# Patient Record
Sex: MALE | Race: WHITE | Employment: OTHER | URBAN - METROPOLITAN AREA
[De-identification: names, ages, dates, MRNs, and addresses within clinical notes are randomized per-mention and may not be internally consistent; named-entity substitution may affect disease eponyms.]

---

## 2021-01-07 ENCOUNTER — HOSPITAL ENCOUNTER (EMERGENCY)
Age: 86
Discharge: HOME | End: 2021-01-07
Attending: EMERGENCY MEDICINE
Payer: MEDICARE

## 2021-01-07 VITALS
TEMPERATURE: 97.3 F | WEIGHT: 147.49 LBS | DIASTOLIC BLOOD PRESSURE: 80 MMHG | RESPIRATION RATE: 16 BRPM | OXYGEN SATURATION: 97 % | HEIGHT: 70 IN | BODY MASS INDEX: 21.11 KG/M2 | HEART RATE: 85 BPM | SYSTOLIC BLOOD PRESSURE: 134 MMHG

## 2021-01-07 DIAGNOSIS — K40.91 UNILATERAL RECURRENT INGUINAL HERNIA WITHOUT OBSTRUCTION OR GANGRENE: Primary | ICD-10-CM

## 2021-01-07 LAB
ALBUMIN SERPL-MCNC: 3.7 G/DL (ref 3.5–5)
ALBUMIN/GLOB SERPL: 1.3 {RATIO} (ref 1.1–2.2)
ALP SERPL-CCNC: 81 U/L (ref 45–117)
ALT SERPL-CCNC: 27 U/L (ref 12–78)
ANION GAP SERPL CALC-SCNC: 3 MMOL/L (ref 5–15)
AST SERPL-CCNC: 16 U/L (ref 15–37)
BASOPHILS # BLD: 0.1 K/UL (ref 0–0.1)
BASOPHILS NFR BLD: 1 % (ref 0–1)
BILIRUB SERPL-MCNC: 0.9 MG/DL (ref 0.2–1)
BUN SERPL-MCNC: 32 MG/DL (ref 6–20)
BUN/CREAT SERPL: 34 (ref 12–20)
CALCIUM SERPL-MCNC: 9 MG/DL (ref 8.5–10.1)
CHLORIDE SERPL-SCNC: 105 MMOL/L (ref 97–108)
CO2 SERPL-SCNC: 30 MMOL/L (ref 21–32)
CREAT SERPL-MCNC: 0.93 MG/DL (ref 0.7–1.3)
DIFFERENTIAL METHOD BLD: ABNORMAL
EOSINOPHIL # BLD: 0.2 K/UL (ref 0–0.4)
EOSINOPHIL NFR BLD: 3 % (ref 0–7)
ERYTHROCYTE [DISTWIDTH] IN BLOOD BY AUTOMATED COUNT: 13.3 % (ref 11.5–14.5)
GLOBULIN SER CALC-MCNC: 2.8 G/DL (ref 2–4)
GLUCOSE SERPL-MCNC: 166 MG/DL (ref 65–100)
HCT VFR BLD AUTO: 40.2 % (ref 36.6–50.3)
HGB BLD-MCNC: 13.8 G/DL (ref 12.1–17)
IMM GRANULOCYTES # BLD AUTO: 0 K/UL (ref 0–0.04)
IMM GRANULOCYTES NFR BLD AUTO: 0 % (ref 0–0.5)
LYMPHOCYTES # BLD: 1.5 K/UL (ref 0.8–3.5)
LYMPHOCYTES NFR BLD: 22 % (ref 12–49)
MCH RBC QN AUTO: 30.9 PG (ref 26–34)
MCHC RBC AUTO-ENTMCNC: 34.3 G/DL (ref 30–36.5)
MCV RBC AUTO: 89.9 FL (ref 80–99)
MONOCYTES # BLD: 0.2 K/UL (ref 0–1)
MONOCYTES NFR BLD: 3 % (ref 5–13)
NEUTS SEG # BLD: 4.7 K/UL (ref 1.8–8)
NEUTS SEG NFR BLD: 71 % (ref 32–75)
NRBC # BLD: 0 K/UL (ref 0–0.01)
NRBC BLD-RTO: 0 PER 100 WBC
PLATELET # BLD AUTO: 205 K/UL (ref 150–400)
PMV BLD AUTO: 9.7 FL (ref 8.9–12.9)
POTASSIUM SERPL-SCNC: 4.1 MMOL/L (ref 3.5–5.1)
PROT SERPL-MCNC: 6.5 G/DL (ref 6.4–8.2)
RBC # BLD AUTO: 4.47 M/UL (ref 4.1–5.7)
SODIUM SERPL-SCNC: 138 MMOL/L (ref 136–145)
WBC # BLD AUTO: 6.6 K/UL (ref 4.1–11.1)

## 2021-01-07 PROCEDURE — 85025 COMPLETE CBC W/AUTO DIFF WBC: CPT

## 2021-01-07 PROCEDURE — 74011250636 HC RX REV CODE- 250/636: Performed by: EMERGENCY MEDICINE

## 2021-01-07 PROCEDURE — 99283 EMERGENCY DEPT VISIT LOW MDM: CPT

## 2021-01-07 PROCEDURE — 80053 COMPREHEN METABOLIC PANEL: CPT

## 2021-01-07 PROCEDURE — 36415 COLL VENOUS BLD VENIPUNCTURE: CPT

## 2021-01-07 RX ADMIN — SODIUM CHLORIDE 500 ML: 9 INJECTION, SOLUTION INTRAVENOUS at 12:48

## 2021-01-07 NOTE — ED PROVIDER NOTES
EMERGENCY DEPARTMENT HISTORY AND PHYSICAL EXAM      Date: 1/7/2021  Patient Name: Jerod Phillips    History of Presenting Illness     Chief Complaint   Patient presents with    Groin Pain     pt has a bulging hernia on his right testicle x 3 weeks. pt was scheduled for surgery to repair next wednesday. Family spoke to Dr [place] nurse who told him to come to ER and Dr Zhen Pablo would try to come fix it today       History Provided By: Patient    HPI: Jerod Phillips, 80 y.o. male with a past medical history significant for Arthritis, asthma, history of spinal stenosis, mild dementia presents to the ED with cc of moderate to severe swelling to his right groin over the last 12 to 24 hours. Over the last several weeks patient has been dealing with a reducible inguinal hernia that he is scheduled to have fixed at [facility] by Dr. Amy Barr. It was recently reduced at an outside hospital in the emergency department he has a scheduled surgery for next Wednesday. He has had no vomiting, significant constipation, fevers, chills, abdominal pain, or any other associated symptoms. No other exacerbating ameliorating factors. There are no other complaints, changes, or physical findings at this time. PCP: Other, MD Anna    No current facility-administered medications on file prior to encounter. Current Outpatient Medications on File Prior to Encounter   Medication Sig Dispense Refill    oxyCODONE-acetaminophen (PERCOCET) 5-325 mg per tablet Take 1-2 Tabs by mouth every four (4) hours as needed for Pain. 60 Tab 0    budesonide (PULMICORT FLEXHALER) 180 mcg/actuation AePB inhaler Take 2 Puffs by inhalation daily. Pt. Instructed to take the morning of surgery.  mometasone (NASONEX) 50 mcg/actuation nasal spray 1 [place] by Both Nostrils route daily.  aspirin delayed-release 81 mg tablet Take 81 mg by mouth daily.  multivitamin (ONE A DAY) tablet Take 1 Tab by mouth daily.       omega-3 fatty acids-vitamin e (FISH OIL) 1,000 mg cap Take 1 Cap by mouth daily.  GLUCOSAMINE HCL/CHONDR CANTU A NA (GLUCOSAMINE-CHONDROITIN) 750-600 mg Tab Take  by mouth daily. Past History     Past Medical History:  Past Medical History:   Diagnosis Date    Arthritis     Asthma     Spinal stenosis of lumbar region with radiculopathy 6/26/2013       Past Surgical History:  Past Surgical History:   Procedure Laterality Date    HX HEENT      T & A    HX OTHER SURGICAL      cyst removed from back       Family History:  No family history on file. Social History:  Social History     Tobacco Use    Smoking status: Never Smoker   Substance Use Topics    Alcohol use: Yes     Comment: occ.  Drug use: Not on file       Allergies:  No Known Allergies      Review of Systems   Review of Systems   Constitutional: Negative for chills, diaphoresis, fatigue and fever. HENT: Negative for ear pain and sore throat. Eyes: Negative for pain and redness. Respiratory: Negative for cough and shortness of breath. Cardiovascular: Negative for chest pain and leg swelling. Gastrointestinal: Negative for abdominal pain, diarrhea, nausea and vomiting. Endocrine: Negative for cold intolerance and heat intolerance. Genitourinary: Negative for flank pain and hematuria. Musculoskeletal: Negative for back pain and neck stiffness. Skin: Negative for rash and wound. Neurological: Negative for dizziness, syncope and headaches. All other systems reviewed and are negative. Physical Exam   Physical Exam  Vitals signs and nursing note reviewed. Constitutional:       Appearance: He is well-developed. HENT:      Head: Normocephalic and atraumatic. Mouth/Throat:      Pharynx: No oropharyngeal exudate. Eyes:      Conjunctiva/sclera: Conjunctivae normal.      Pupils: Pupils are equal, round, and reactive to light. Neck:      Musculoskeletal: Normal range of motion.    Cardiovascular:      Rate and Rhythm: Normal rate and regular rhythm. Heart sounds: No murmur. Pulmonary:      Effort: Pulmonary effort is normal. No respiratory distress. Breath sounds: Normal breath sounds. No wheezing. Abdominal:      General: Bowel sounds are normal. There is no distension. Palpations: Abdomen is soft. Tenderness: There is no abdominal tenderness. Genitourinary:         Comments: Patient has a reducible right inguinal hernia with no significant erythema or fluctuance. Musculoskeletal: Normal range of motion. General: No deformity. Skin:     General: Skin is warm and dry. Findings: No rash. Neurological:      Mental Status: He is alert and oriented to person, place, and time. Coordination: Coordination normal.   Psychiatric:         Behavior: Behavior normal.         Diagnostic Study Results     Labs -     Recent Results (from the past 24 hour(s))   CBC WITH AUTOMATED DIFF    Collection Time: 01/07/21 12:47 PM   Result Value Ref Range    WBC 6.6 4.1 - 11.1 K/uL    RBC 4.47 4.10 - 5.70 M/uL    HGB 13.8 12.1 - 17.0 g/dL    HCT 40.2 36.6 - 50.3 %    MCV 89.9 80.0 - 99.0 FL    MCH 30.9 26.0 - 34.0 PG    MCHC 34.3 30.0 - 36.5 g/dL    RDW 13.3 11.5 - 14.5 %    PLATELET 854 795 - 057 K/uL    MPV 9.7 8.9 - 12.9 FL    NRBC 0.0 0  WBC    ABSOLUTE NRBC 0.00 0.00 - 0.01 K/uL    NEUTROPHILS 71 32 - 75 %    LYMPHOCYTES 22 12 - 49 %    MONOCYTES 3 (L) 5 - 13 %    EOSINOPHILS 3 0 - 7 %    BASOPHILS 1 0 - 1 %    IMMATURE GRANULOCYTES 0 0.0 - 0.5 %    ABS. NEUTROPHILS 4.7 1.8 - 8.0 K/UL    ABS. LYMPHOCYTES 1.5 0.8 - 3.5 K/UL    ABS. MONOCYTES 0.2 0.0 - 1.0 K/UL    ABS. EOSINOPHILS 0.2 0.0 - 0.4 K/UL    ABS. BASOPHILS 0.1 0.0 - 0.1 K/UL    ABS. IMM.  GRANS. 0.0 0.00 - 0.04 K/UL    DF AUTOMATED     METABOLIC PANEL, COMPREHENSIVE    Collection Time: 01/07/21 12:47 PM   Result Value Ref Range    Sodium 138 136 - 145 mmol/L    Potassium 4.1 3.5 - 5.1 mmol/L    Chloride 105 97 - 108 mmol/L    CO2 30 21 - 32 mmol/L    Anion gap 3 (L) 5 - 15 mmol/L    Glucose 166 (H) 65 - 100 mg/dL    BUN 32 (H) 6 - 20 MG/DL    Creatinine 0.93 0.70 - 1.30 MG/DL    BUN/Creatinine ratio 34 (H) 12 - 20      GFR est AA >60 >60 ml/min/1.73m2    GFR est non-AA >60 >60 ml/min/1.73m2    Calcium 9.0 8.5 - 10.1 MG/DL    Bilirubin, total 0.9 0.2 - 1.0 MG/DL    ALT (SGPT) 27 12 - 78 U/L    AST (SGOT) 16 15 - 37 U/L    Alk. phosphatase 81 45 - 117 U/L    Protein, total 6.5 6.4 - 8.2 g/dL    Albumin 3.7 3.5 - 5.0 g/dL    Globulin 2.8 2.0 - 4.0 g/dL    A-G Ratio 1.3 1.1 - 2.2         Radiologic Studies -   No orders to display     CT Results  (Last 48 hours)    None        CXR Results  (Last 48 hours)    None            Medical Decision Making   I am the first provider for this patient. I reviewed the vital signs, available nursing notes, past medical history, past surgical history, family history and social history. Vital Signs-Reviewed the patient's vital signs. Patient Vitals for the past 12 hrs:   Temp Pulse Resp BP SpO2   01/07/21 1159 97.3 °F (36.3 °C) 85 16 134/80 97 %       Records Reviewed: Nursing records and medical records reviewed    MDM:  Pt presents with acute abdominal pain; vital signs stable with currently a non-peritoneal exam; DDx includes: Gastroenteritis, hepatitis, pancreatitis, obstruction, appendicitis, viral illness, IBD, diverticulitis, mesenteric ischemia, AAA or descending dissection, ACS, kidney stone. Will get labs, treat symptomatically and obtain serial abdominal exams to determine if a CT is warranted. Will reassess and monitor closely. Provider Notes (Medical Decision Making):   79-year-old male presenting with complaints of significant swelling to the right groin area. Reducible hernia was found and patient tolerated well. Multiple attempts made to contact patient's general surgeon but no return call.   The patient and their family was able to contact his office and they reported that \"he went to the wrong hospital\". The patient's son and family feel that the patient should come home with them tonight and be scheduled to see him tomorrow. Given the patient is taking p.o. well, not vomiting, not displaying any signs of bowel obstruction, I think is very reasonable. I reduce his hernia successfully from my stance. Strict return cautions were given. ED Course:   Initial assessment performed. The patients presenting problems have been discussed, and they are in agreement with the care plan formulated and outlined with them. I have encouraged them to ask questions as they arise throughout their visit. Medications Administered     sodium chloride 0.9 % bolus infusion 500 mL     Admin Date  01/07/2021 Action  New Bag Dose  500 mL Rate  500 mL/hr Route  IntraVENous Administered By  Inessa Cota RN              With gentle pressure I was able to reduce the patient's inguinal hernia and he tolerated very well. Took approximately 3 minutes of gentle pressure to reduce the hernia. After that time swelling resolved and he had no abdominal tenderness on exam.      Critical Care:  None      Disposition:  4:34 PM  Ottoniel Reyes's  results have been reviewed with him. He has been counseled regarding his diagnosis. He verbally conveys understanding and agreement of the signs, symptoms, diagnosis, treatment and prognosis and additionally agrees to follow up as recommended with Anna Hamilton MD in 24 - 48 hours. He also agrees with the care-plan and conveys that all of his questions have been answered. I have also put together some discharge instructions for him that include: 1) educational information regarding their diagnosis, 2) how to care for their diagnosis at home, as well a 3) list of reasons why they would want to return to the ED prior to their follow-up appointment, should their condition change. DISCHARGE PLAN:  1. Current Discharge Medication List        2.    Follow-up Information     Follow up With Specialties Details Why Contact Info    Neysa Meckel, MD General Surgery In 1 day For a follow-up evaluation to have hernia repaired. . [address] [facility]  [facility]  [phone]          3. Return to ED if worse     Diagnosis     Clinical Impression:   1. Unilateral recurrent inguinal hernia without obstruction or gangrene        Attestations:    Edy Gee MD    Please note that this dictation was completed with Xuba, the computer voice recognition software. Quite often unanticipated grammatical, syntax, homophones, and other interpretive errors are inadvertently transcribed by the computer software. Please disregard these errors. Please excuse any errors that have escaped final proofreading. Thank you.

## 2021-04-24 ENCOUNTER — HOSPITAL ENCOUNTER (INPATIENT)
Age: 86
LOS: 3 days | Discharge: NURSING, REHAB OR LONG-TERM CARE | DRG: 726 | End: 2021-04-28
Attending: EMERGENCY MEDICINE | Admitting: HOSPITALIST
Payer: MEDICARE

## 2021-04-24 ENCOUNTER — APPOINTMENT (OUTPATIENT)
Dept: CT IMAGING | Age: 86
DRG: 726 | End: 2021-04-24
Attending: EMERGENCY MEDICINE
Payer: MEDICARE

## 2021-04-24 DIAGNOSIS — G30.9 ALZHEIMER'S DEMENTIA WITH BEHAVIORAL DISTURBANCE, UNSPECIFIED TIMING OF DEMENTIA ONSET: ICD-10-CM

## 2021-04-24 DIAGNOSIS — R73.9 HYPERGLYCEMIA: ICD-10-CM

## 2021-04-24 DIAGNOSIS — R33.9 URINARY RETENTION: ICD-10-CM

## 2021-04-24 DIAGNOSIS — R10.9 FLANK PAIN: Primary | ICD-10-CM

## 2021-04-24 DIAGNOSIS — R31.9 HEMATURIA, UNSPECIFIED TYPE: ICD-10-CM

## 2021-04-24 DIAGNOSIS — F02.81 ALZHEIMER'S DEMENTIA WITH BEHAVIORAL DISTURBANCE, UNSPECIFIED TIMING OF DEMENTIA ONSET: ICD-10-CM

## 2021-04-24 LAB
ALBUMIN SERPL-MCNC: 3.8 G/DL (ref 3.5–5)
ALBUMIN/GLOB SERPL: 1.3 {RATIO} (ref 1.1–2.2)
ALP SERPL-CCNC: 99 U/L (ref 45–117)
ALT SERPL-CCNC: 30 U/L (ref 12–78)
ANION GAP SERPL CALC-SCNC: 13 MMOL/L (ref 5–15)
APPEARANCE UR: ABNORMAL
AST SERPL-CCNC: 16 U/L (ref 15–37)
BACTERIA URNS QL MICRO: NEGATIVE /HPF
BASOPHILS # BLD: 0.1 K/UL (ref 0–0.1)
BASOPHILS NFR BLD: 1 % (ref 0–1)
BILIRUB SERPL-MCNC: 0.7 MG/DL (ref 0.2–1)
BILIRUB UR QL: NEGATIVE
BUN SERPL-MCNC: 29 MG/DL (ref 6–20)
BUN/CREAT SERPL: 34 (ref 12–20)
CALCIUM SERPL-MCNC: 8.7 MG/DL (ref 8.5–10.1)
CHLORIDE SERPL-SCNC: 107 MMOL/L (ref 97–108)
CO2 SERPL-SCNC: 26 MMOL/L (ref 21–32)
COLOR UR: ABNORMAL
CREAT SERPL-MCNC: 0.86 MG/DL (ref 0.7–1.3)
DIFFERENTIAL METHOD BLD: ABNORMAL
EOSINOPHIL # BLD: 0.2 K/UL (ref 0–0.4)
EOSINOPHIL NFR BLD: 3 % (ref 0–7)
EPITH CASTS URNS QL MICRO: ABNORMAL /LPF
ERYTHROCYTE [DISTWIDTH] IN BLOOD BY AUTOMATED COUNT: 13.1 % (ref 11.5–14.5)
GLOBULIN SER CALC-MCNC: 3 G/DL (ref 2–4)
GLUCOSE SERPL-MCNC: 175 MG/DL (ref 65–100)
GLUCOSE UR STRIP.AUTO-MCNC: NEGATIVE MG/DL
HCT VFR BLD AUTO: 41.8 % (ref 36.6–50.3)
HGB BLD-MCNC: 14.1 G/DL (ref 12.1–17)
HGB UR QL STRIP: ABNORMAL
IMM GRANULOCYTES # BLD AUTO: 0 K/UL (ref 0–0.04)
IMM GRANULOCYTES NFR BLD AUTO: 0 % (ref 0–0.5)
KETONES UR QL STRIP.AUTO: NEGATIVE MG/DL
LACTATE SERPL-SCNC: 1.7 MMOL/L (ref 0.4–2)
LEUKOCYTE ESTERASE UR QL STRIP.AUTO: NEGATIVE
LIPASE SERPL-CCNC: 118 U/L (ref 73–393)
LYMPHOCYTES # BLD: 1.5 K/UL (ref 0.8–3.5)
LYMPHOCYTES NFR BLD: 17 % (ref 12–49)
MAGNESIUM SERPL-MCNC: 2.1 MG/DL (ref 1.6–2.4)
MCH RBC QN AUTO: 30.9 PG (ref 26–34)
MCHC RBC AUTO-ENTMCNC: 33.7 G/DL (ref 30–36.5)
MCV RBC AUTO: 91.5 FL (ref 80–99)
MONOCYTES # BLD: 0.2 K/UL (ref 0–1)
MONOCYTES NFR BLD: 2 % (ref 5–13)
MUCOUS THREADS URNS QL MICRO: ABNORMAL /LPF
NEUTS SEG # BLD: 6.6 K/UL (ref 1.8–8)
NEUTS SEG NFR BLD: 77 % (ref 32–75)
NITRITE UR QL STRIP.AUTO: NEGATIVE
NRBC # BLD: 0 K/UL (ref 0–0.01)
NRBC BLD-RTO: 0 PER 100 WBC
PH UR STRIP: 6 [PH] (ref 5–8)
PLATELET # BLD AUTO: 222 K/UL (ref 150–400)
PMV BLD AUTO: 10.1 FL (ref 8.9–12.9)
POTASSIUM SERPL-SCNC: 4 MMOL/L (ref 3.5–5.1)
PROT SERPL-MCNC: 6.8 G/DL (ref 6.4–8.2)
PROT UR STRIP-MCNC: NEGATIVE MG/DL
RBC # BLD AUTO: 4.57 M/UL (ref 4.1–5.7)
RBC #/AREA URNS HPF: ABNORMAL /HPF (ref 0–5)
SODIUM SERPL-SCNC: 146 MMOL/L (ref 136–145)
SP GR UR REFRACTOMETRY: 1.02 (ref 1–1.03)
UR CULT HOLD, URHOLD: NORMAL
UROBILINOGEN UR QL STRIP.AUTO: 0.2 EU/DL (ref 0.2–1)
WBC # BLD AUTO: 8.6 K/UL (ref 4.1–11.1)
WBC URNS QL MICRO: ABNORMAL /HPF (ref 0–4)

## 2021-04-24 PROCEDURE — 87086 URINE CULTURE/COLONY COUNT: CPT

## 2021-04-24 PROCEDURE — 99283 EMERGENCY DEPT VISIT LOW MDM: CPT

## 2021-04-24 PROCEDURE — 96374 THER/PROPH/DIAG INJ IV PUSH: CPT

## 2021-04-24 PROCEDURE — 81001 URINALYSIS AUTO W/SCOPE: CPT

## 2021-04-24 PROCEDURE — 74011250636 HC RX REV CODE- 250/636: Performed by: EMERGENCY MEDICINE

## 2021-04-24 PROCEDURE — 74177 CT ABD & PELVIS W/CONTRAST: CPT

## 2021-04-24 PROCEDURE — 36415 COLL VENOUS BLD VENIPUNCTURE: CPT

## 2021-04-24 PROCEDURE — 83690 ASSAY OF LIPASE: CPT

## 2021-04-24 PROCEDURE — 96376 TX/PRO/DX INJ SAME DRUG ADON: CPT

## 2021-04-24 PROCEDURE — 74011000636 HC RX REV CODE- 636: Performed by: EMERGENCY MEDICINE

## 2021-04-24 PROCEDURE — 80053 COMPREHEN METABOLIC PANEL: CPT

## 2021-04-24 PROCEDURE — 85025 COMPLETE CBC W/AUTO DIFF WBC: CPT

## 2021-04-24 PROCEDURE — 83735 ASSAY OF MAGNESIUM: CPT

## 2021-04-24 PROCEDURE — 83605 ASSAY OF LACTIC ACID: CPT

## 2021-04-24 RX ORDER — LORAZEPAM 2 MG/ML
0.5 INJECTION INTRAMUSCULAR
Status: COMPLETED | OUTPATIENT
Start: 2021-04-24 | End: 2021-04-25

## 2021-04-24 RX ORDER — LORAZEPAM 2 MG/ML
1 INJECTION INTRAMUSCULAR
Status: COMPLETED | OUTPATIENT
Start: 2021-04-24 | End: 2021-04-24

## 2021-04-24 RX ORDER — SODIUM CHLORIDE 0.9 % (FLUSH) 0.9 %
10 SYRINGE (ML) INJECTION
Status: DISPENSED | OUTPATIENT
Start: 2021-04-24 | End: 2021-04-25

## 2021-04-24 RX ADMIN — IOPAMIDOL 100 ML: 755 INJECTION, SOLUTION INTRAVENOUS at 22:58

## 2021-04-24 RX ADMIN — LORAZEPAM 1 MG: 2 INJECTION, SOLUTION INTRAMUSCULAR; INTRAVENOUS at 22:23

## 2021-04-25 PROBLEM — R10.9 ABDOMINAL PAIN: Status: ACTIVE | Noted: 2021-04-25

## 2021-04-25 PROCEDURE — 65270000029 HC RM PRIVATE

## 2021-04-25 PROCEDURE — 74011250636 HC RX REV CODE- 250/636: Performed by: HOSPITALIST

## 2021-04-25 PROCEDURE — 74011250637 HC RX REV CODE- 250/637: Performed by: HOSPITALIST

## 2021-04-25 PROCEDURE — 74011000258 HC RX REV CODE- 258: Performed by: EMERGENCY MEDICINE

## 2021-04-25 PROCEDURE — 74011250636 HC RX REV CODE- 250/636: Performed by: EMERGENCY MEDICINE

## 2021-04-25 RX ORDER — ONDANSETRON 2 MG/ML
4 INJECTION INTRAMUSCULAR; INTRAVENOUS
Status: DISCONTINUED | OUTPATIENT
Start: 2021-04-25 | End: 2021-04-28 | Stop reason: HOSPADM

## 2021-04-25 RX ORDER — THERA TABS 400 MCG
1 TAB ORAL DAILY
Status: DISCONTINUED | OUTPATIENT
Start: 2021-04-25 | End: 2021-04-28 | Stop reason: HOSPADM

## 2021-04-25 RX ORDER — ASPIRIN 81 MG/1
81 TABLET ORAL DAILY
Status: DISCONTINUED | OUTPATIENT
Start: 2021-04-25 | End: 2021-04-25

## 2021-04-25 RX ORDER — SERTRALINE HYDROCHLORIDE 50 MG/1
50 TABLET, FILM COATED ORAL DAILY
Status: ON HOLD | COMMUNITY
End: 2021-07-29

## 2021-04-25 RX ORDER — LEVOTHYROXINE SODIUM 50 UG/1
50 TABLET ORAL DAILY
Status: DISCONTINUED | OUTPATIENT
Start: 2021-04-25 | End: 2021-04-28 | Stop reason: HOSPADM

## 2021-04-25 RX ORDER — FINASTERIDE 5 MG/1
5 TABLET, FILM COATED ORAL DAILY
Status: DISCONTINUED | OUTPATIENT
Start: 2021-04-26 | End: 2021-04-28 | Stop reason: HOSPADM

## 2021-04-25 RX ORDER — SODIUM CHLORIDE 0.9 % (FLUSH) 0.9 %
5-40 SYRINGE (ML) INJECTION AS NEEDED
Status: DISCONTINUED | OUTPATIENT
Start: 2021-04-25 | End: 2021-04-28 | Stop reason: HOSPADM

## 2021-04-25 RX ORDER — POLYETHYLENE GLYCOL 3350 17 G/17G
17 POWDER, FOR SOLUTION ORAL DAILY PRN
Status: DISCONTINUED | OUTPATIENT
Start: 2021-04-25 | End: 2021-04-26

## 2021-04-25 RX ORDER — PROMETHAZINE HYDROCHLORIDE 25 MG/1
12.5 TABLET ORAL
Status: DISCONTINUED | OUTPATIENT
Start: 2021-04-25 | End: 2021-04-28 | Stop reason: HOSPADM

## 2021-04-25 RX ORDER — SODIUM CHLORIDE 9 MG/ML
75 INJECTION, SOLUTION INTRAVENOUS CONTINUOUS
Status: DISCONTINUED | OUTPATIENT
Start: 2021-04-25 | End: 2021-04-25

## 2021-04-25 RX ORDER — HALOPERIDOL 5 MG/ML
0.5 INJECTION INTRAMUSCULAR
Status: DISCONTINUED | OUTPATIENT
Start: 2021-04-25 | End: 2021-04-28 | Stop reason: HOSPADM

## 2021-04-25 RX ORDER — LEVOTHYROXINE SODIUM 75 UG/1
75 TABLET ORAL
Status: ON HOLD | COMMUNITY
End: 2021-07-29

## 2021-04-25 RX ORDER — SODIUM CHLORIDE 9 MG/ML
50 INJECTION, SOLUTION INTRAVENOUS CONTINUOUS
Status: DISCONTINUED | OUTPATIENT
Start: 2021-04-25 | End: 2021-04-28 | Stop reason: HOSPADM

## 2021-04-25 RX ORDER — ACETAMINOPHEN 650 MG/1
650 SUPPOSITORY RECTAL
Status: DISCONTINUED | OUTPATIENT
Start: 2021-04-25 | End: 2021-04-28 | Stop reason: HOSPADM

## 2021-04-25 RX ORDER — SERTRALINE HYDROCHLORIDE 50 MG/1
50 TABLET, FILM COATED ORAL DAILY
Status: DISCONTINUED | OUTPATIENT
Start: 2021-04-25 | End: 2021-04-28 | Stop reason: HOSPADM

## 2021-04-25 RX ORDER — SODIUM CHLORIDE 0.9 % (FLUSH) 0.9 %
5-40 SYRINGE (ML) INJECTION EVERY 8 HOURS
Status: DISCONTINUED | OUTPATIENT
Start: 2021-04-25 | End: 2021-04-28 | Stop reason: HOSPADM

## 2021-04-25 RX ORDER — OXYCODONE AND ACETAMINOPHEN 5; 325 MG/1; MG/1
1 TABLET ORAL
Status: DISCONTINUED | OUTPATIENT
Start: 2021-04-25 | End: 2021-04-25

## 2021-04-25 RX ORDER — TAMSULOSIN HYDROCHLORIDE 0.4 MG/1
0.4 CAPSULE ORAL DAILY
Status: DISCONTINUED | OUTPATIENT
Start: 2021-04-26 | End: 2021-04-28 | Stop reason: HOSPADM

## 2021-04-25 RX ORDER — ACETAMINOPHEN 325 MG/1
650 TABLET ORAL
Status: DISCONTINUED | OUTPATIENT
Start: 2021-04-25 | End: 2021-04-28 | Stop reason: HOSPADM

## 2021-04-25 RX ADMIN — HALOPERIDOL LACTATE 0.5 MG: 5 INJECTION, SOLUTION INTRAMUSCULAR at 23:50

## 2021-04-25 RX ADMIN — SERTRALINE 50 MG: 50 TABLET, FILM COATED ORAL at 14:25

## 2021-04-25 RX ADMIN — LEVOTHYROXINE SODIUM 50 MCG: 0.05 TABLET ORAL at 14:25

## 2021-04-25 RX ADMIN — ACETAMINOPHEN 650 MG: 325 TABLET, FILM COATED ORAL at 17:33

## 2021-04-25 RX ADMIN — CEFTRIAXONE SODIUM 1 G: 1 INJECTION, POWDER, FOR SOLUTION INTRAMUSCULAR; INTRAVENOUS at 01:24

## 2021-04-25 RX ADMIN — ACETAMINOPHEN 650 MG: 325 TABLET, FILM COATED ORAL at 10:22

## 2021-04-25 RX ADMIN — HALOPERIDOL LACTATE 0.5 MG: 5 INJECTION, SOLUTION INTRAMUSCULAR at 19:11

## 2021-04-25 RX ADMIN — SODIUM CHLORIDE 50 ML/HR: 9 INJECTION, SOLUTION INTRAVENOUS at 11:34

## 2021-04-25 RX ADMIN — Medication 10 ML: at 21:40

## 2021-04-25 RX ADMIN — LORAZEPAM 0.5 MG: 2 INJECTION, SOLUTION INTRAMUSCULAR; INTRAVENOUS at 00:16

## 2021-04-25 RX ADMIN — THERA TABS 1 TABLET: TAB at 14:25

## 2021-04-25 RX ADMIN — SODIUM CHLORIDE 1000 ML: 9 INJECTION, SOLUTION INTRAVENOUS at 02:55

## 2021-04-25 NOTE — ED TRIAGE NOTES
Patient presents to the emergency department reporting right sided flank pain. Patient's wife reports patient noticed pain this evening. Patient's wife reports orange urine.

## 2021-04-25 NOTE — PROGRESS NOTES
Primary Nurse Shy Leija and KARTIK Vance performed a dual skin assessment on this patient No impairment noted  Delfino score is 18

## 2021-04-25 NOTE — CONSULTS
Urology Consult    Patient: Yanna Betancourt MRN: [id number]  SSN: [id number]    YOB: 1933  Age: 80 y.o. Sex: male          Date of Consultation:  2021  Requesting Physician: Deandra Johnson MD  Reason for Consultation: Urinary retention           Assessment/Plan:  Urinary retention. Post rodriguez placement. Hx BPH. Keep rodriguez in place and will need outpatient evaluation and voiding trial.  Recommend finasteride and tamsulosin qd     History of Present Illness:  Patient is a 80 y.o. male admitted 2021 to the hospital for Abdominal pain [R10.9]. He presents with urinary retention. Past Medical History:  No Known Allergies   Prior to Admission medications    Medication Sig Start Date End Date Taking? Authorizing Provider   levothyroxine (SYNTHROID) 25 mcg tablet Take 50 mcg by mouth Daily (before breakfast). Yes Provider, Historical   sertraline (Zoloft) 50 mg tablet Take 50 mg by mouth daily. Yes Provider, Historical   multivitamin (ONE A DAY) tablet Take 1 Tab by mouth daily. Yes Provider, Historical   omega-3 fatty acids-vitamin e (FISH OIL) 1,000 mg cap Take 1 Cap by mouth daily. Yes Provider, Historical      PMHx:  has a past medical history of Arthritis, Asthma, and Spinal stenosis of lumbar region with radiculopathy (2013). PSurgHx:  has a past surgical history that includes hx heent and hx other surgical.  PSocHx:  reports that he has never smoked. He does not have any smokeless tobacco history on file. He reports current alcohol use. ROS:  Admission ROS by Shelly Alarcon MD from 2021 were reviewed with the patient and changes (other than per HPI) include: none. Physical Exam:    Vitals:   Temp (24hrs), Av.8 °F (36.6 °C), Min:97.5 °F (36.4 °C), Max:98 °F (36.7 °C)   Blood pressure 120/72, pulse 98, temperature 97.5 °F (36.4 °C), resp. rate 18, weight 67.2 kg (148 lb 2.4 oz), SpO2 97 %.   I&O's:  701 - 1900  In: - Out: 1000 [Urine:1000]  GENERAL: WD, elderly male NAD  SKIN:  No clubbing, cyanosis, or edema  ABDOMEN: Soft, non-tender without masses  FLANKS: No CVAT  BLADDER: Not palpable  :  Normal male phallus, scrotum and contents normal  LYMPH: No cervical,k supraclavicular or axillary DIMITRI      Lab Results   Component Value Date/Time    WBC 8.6 04/24/2021 09:04 PM    HCT 41.8 04/24/2021 09:04 PM    PLATELET 674 52/42/4467 09:04 PM    Sodium 146 (H) 04/24/2021 09:04 PM    Potassium 4.0 04/24/2021 09:04 PM    Chloride 107 04/24/2021 09:04 PM    CO2 26 04/24/2021 09:04 PM    BUN 29 (H) 04/24/2021 09:04 PM    Creatinine 0.86 04/24/2021 09:04 PM    Glucose 175 (H) 04/24/2021 09:04 PM    Calcium 8.7 04/24/2021 09:04 PM    Magnesium 2.1 04/24/2021 09:04 PM    INR 0.9 06/14/2013 11:50 AM       UA:   Lab Results   Component Value Date/Time    Color YELLOW/STRAW 04/24/2021 09:50 PM    Appearance HAZY (A) 04/24/2021 09:50 PM    Specific gravity 1.025 04/24/2021 09:50 PM    pH (UA) 6.0 04/24/2021 09:50 PM    Protein Negative 04/24/2021 09:50 PM    Glucose Negative 04/24/2021 09:50 PM    Ketone Negative 04/24/2021 09:50 PM    Bilirubin Negative 04/24/2021 09:50 PM    Urobilinogen 0.2 04/24/2021 09:50 PM    Nitrites Negative 04/24/2021 09:50 PM    Leukocyte Esterase Negative 04/24/2021 09:50 PM    Epithelial cells FEW 04/24/2021 09:50 PM    Bacteria Negative 04/24/2021 09:50 PM    WBC 0-4 04/24/2021 09:50 PM    RBC  04/24/2021 09:50 PM       Cultures:     Xrays:     Signed By: Jarred Valenzuela MD  - April 25, 2021

## 2021-04-25 NOTE — PROGRESS NOTES
TRANSFER - IN REPORT:    Verbal report received from [facility] (name) on Judge De La Fuente  being received from [place] [address] ED (unit) for routine progression of care      Report consisted of patients Situation, Background, Assessment and   Recommendations(SBAR). Information from the following report(s) SBAR, ED Summary, Procedure Summary, Intake/Output and MAR was reviewed with the receiving nurse. Opportunity for questions and clarification was provided. Assessment completed upon patients arrival to unit and care assumed.

## 2021-04-25 NOTE — ED NOTES
Pt resting comfortably, obtained VSS, BP was low at 88/52, MD informed, 1 Liter normal Saline ordered and given. Orta is still draining concentrated yellow urine. Will continue to monitor.

## 2021-04-25 NOTE — ED PROVIDER NOTES
Patient is an 77-year-old male with past medical history significant for spinal stenosis, arthritis, asthma, dementia as well as right inguinal hernia repair within the past year who presents to the emergency department with his wife and daughter for evaluation of apparent right flank pain. Patient's wife states that they moved into an assisted living a few weeks ago after having lived with her daughter for some time. She states that patient does have a history of memory issues and is also very hard of hearing. She states though that today it seems as though he has been having some pain in his right flank area and she is noted that the urine looks orange color. Denies any Azo or something similar over-the-counter. Denies any fevers or apparent chills. She states that yesterday he did have a bout of diarrhea that was apparently nonbloody but required help from staff to clean them out because he did not make it to the bathroom in time. She denies ongoing episodes of similar. Patient states that he did eat well today and had 3 full meals. Wife states that he does have some evening sundowning type symptoms however things have been quite a bit worse over the past 3 days and he seems much more altered than normal.           Past Medical History:   Diagnosis Date    Arthritis     Asthma     Spinal stenosis of lumbar region with radiculopathy 6/26/2013       Past Surgical History:   Procedure Laterality Date    HX HEENT      T & A    HX OTHER SURGICAL      cyst removed from back         No family history on file.     Social History     Socioeconomic History    Marital status:      Spouse name: Not on file    Number of children: Not on file    Years of education: Not on file    Highest education level: Not on file   Occupational History    Not on file   Social Needs    Financial resource strain: Not on file    Food insecurity     Worry: Not on file     Inability: Not on file   [employer] needs     Medical: Not on file     Non-medical: Not on file   Tobacco Use    Smoking status: Never Smoker   Substance and Sexual Activity    Alcohol use: Yes     Comment: occ.  Drug use: Not on file    Sexual activity: Not on file   Lifestyle    Physical activity     Days per week: Not on file     Minutes per session: Not on file    Stress: Not on file   Relationships    Social connections     Talks on phone: Not on file     Gets together: Not on file     Attends Evangelical service: Not on file     Active member of club or organization: Not on file     Attends meetings of clubs or organizations: Not on file     Relationship status: Not on file    Intimate partner violence     Fear of current or ex partner: Not on file     Emotionally abused: Not on file     Physically abused: Not on file     Forced sexual activity: Not on file   Other Topics Concern    Not on file   Social History Narrative    Not on file         ALLERGIES: Patient has no known allergies. Review of Systems   Unable to perform ROS: Dementia       Vitals:    04/24/21 2054   BP: (!) 167/89   Pulse: 82   Resp: 20   SpO2: 97%   Weight: 67.2 kg (148 lb 2.4 oz)            Physical Exam  Vitals signs and nursing note reviewed. Constitutional:       General: He is not in acute distress. Appearance: Normal appearance. He is not toxic-appearing or diaphoretic. Comments: elderly   HENT:      Head: Normocephalic and atraumatic. Right Ear: External ear normal.      Left Ear: External ear normal.      Ears:      Comments: Very hard of hearing  Eyes:      General: No scleral icterus. Neck:      Musculoskeletal: Neck supple. Cardiovascular:      Rate and Rhythm: Normal rate. Pulses: Normal pulses. Heart sounds: No friction rub. Comments: Strong equal distal pulses  Pulmonary:      Effort: Pulmonary effort is normal.      Breath sounds: Normal breath sounds. Abdominal:      Palpations: Abdomen is soft. Tenderness: There is abdominal tenderness (mild on right). There is no right CVA tenderness, left CVA tenderness or rebound. Hernia: A hernia is present. Hernia is present in the right inguinal area. Comments: Incision noted right inguinal area with small bulge which is tender but reducible. Musculoskeletal:         General: No swelling, tenderness or deformity. Right lower leg: No edema. Left lower leg: No edema. Skin:     General: Skin is warm and dry. Neurological:      General: No focal deficit present. Mental Status: He is alert. He is disoriented. Psychiatric:         Attention and Perception: He is inattentive. Behavior: Behavior is agitated. MDM  Number of Diagnoses or Management Options  Alzheimer's dementia with behavioral disturbance, unspecified timing of dementia onset (HCC)  Flank pain  Hematuria, unspecified type  Hyperglycemia  Urinary retention  Diagnosis management comments: Patient is assisted-living contacted and states that they are not able to give the patient one-to-one nursing care overnight. Additionally due to Covid precautions the patient's daughter not allowed to stay with the patient and his wife tonight in order to help care for him. As such we will admit to the hospital for further observation and care. Amount and/or Complexity of Data Reviewed  Clinical lab tests: reviewed and ordered  Tests in the radiology section of CPT®: reviewed and ordered  Decide to obtain previous medical records or to obtain history from someone other than the patient: yes  Discuss the patient with other providers: yes  Independent visualization of images, tracings, or specimens: yes    Patient Progress  Patient progress: stable         Procedures    Perfect Serve Consult for Admission  12:38 AM    ED Room Number: SER04/04  Patient Name and age:  Fernando Obrien 80 y.o.  male  Working Diagnosis:   1. Flank pain    2. Hematuria, unspecified type    3. Hyperglycemia    4. Urinary retention    5. Alzheimer's dementia with behavioral disturbance, unspecified timing of dementia onset ([facility]        COVID-19 Suspicion:  no  Sepsis present:  no  Reassessment needed: no  Code Status:  Full Code  Readmission: no  Isolation Requirements:  no  Recommended Level of Care:  telemetry  Department:[facility] - [phone]  Other: Patient is an 75-year-old male with past medical history significant for spinal stenosis, arthritis, asthma and Alzheimer's dementia who is status post right inguinal hernia repair in January of this year who presents emergency department with his wife and daughter for apparent flank pain. Patient found to be in significant urinary retention however he has behavioral disturbance which precludes him from going back to the assisted living facility due to his level of redirecting and constant attendance that he requires. Patient also found to have hematuria and a nonspecific enterocolitis noted on CAT scan. IV antibiotic started however lactate negative and vital signs stable.

## 2021-04-25 NOTE — PROGRESS NOTES
Bedside shift change report given to [facility], RN (oncoming nurse) by Radha Hill (offgoing nurse). Report included the following information SBAR, Kardex, Procedure Summary, Intake/Output, MAR and Recent Results.

## 2021-04-25 NOTE — PROGRESS NOTES
Hospitalist Progress Note              Candido Clayton MD.                                                             Cell: [phone]                               NAME:  Andrea Spangler  :  3/23/1933  MRN:  [id number]  Date of Service:  2021    Summary: 80 y.o. male who presented on 2021 with right flank pain. CT scan performed demonstrated significant bladder distention distention most likely related to chronic bladder outlet obstruction. Imaging findings suggestive of a mild diffuse enterocolitis.           Assessment/Plan:  Bladder outlet obstruction; S/p rodriguez catheter insertion  Likely due to BPH  Consult urology    Dementia with acute behavioral disturbance: this was precipitated by bladder obstruction  Supportive care  F/u with . May need referral to memory care    Hematuria; Due to #1      Code status: Full, recommend DNR  DVT prophylaxsis: SCD  Dispo: tbd         Interval History/Subjective:    F/u for right flank pain   No new complaints    Review of Systems:  A comprehensive review of systems was negative except for that written in the HPI. Objective:     VITALS:   Last 24hrs VS reviewed since prior progress note. Most recent are:  Visit Vitals  /72 (BP 1 Location: Right upper arm, BP Patient Position: At rest)   Pulse 98   Temp 97.5 °F (36.4 °C)   Resp 18   Wt 67.2 kg (148 lb 2.4 oz)   SpO2 97%   BMI 21.26 kg/m²       Intake/Output Summary (Last 24 hours) at 2021 1013  Last data filed at 2021 2103  Gross per 24 hour   Intake    Output 2350 ml   Net -2350 ml        PHYSICAL EXAM:  General: No acute distress, cooperative, pleasant   EENT: EOMI. Anicteric sclerae. Oral mucous moist, oropharynx benign  Resp: CTA bilaterally. No wheezing/rhonchi/rales. No accessory muscle use  CV: Regular rhythm, normal rate, no murmurs, gallops, rubs  GI: Soft, non distended, non tender.  normoactive bowel sounds, no hepatosplenomegaly Extremities: No edema, warm, 2+ pulses throughout  Neurologic: Moves all extremities. AAOx3, CN II-XII grossly intact  Psych: Good insight. Not anxious nor agitated. Skin: Good Turgor, no rashes or ulcers    Lab Data Personally Reviewed: (see below)     Medications list Personally Reviewed:  x YES  NO     _______________________________________________________________________  Care Plan discussed with:  Patient/Family and Nurse    Total NON critical care TIME:  30 minutes    Lorelei Reyna MD     Procedures: see electronic medical records for all procedures/Xrays and details which were not copied into this note but were reviewed prior to creation of Plan. LABS:  Recent Labs     04/24/21  2104   WBC 8.6   HGB 14.1   HCT 41.8        Recent Labs     04/24/21 2104   *   K 4.0      CO2 26   BUN 29*   CREA 0.86   *   CA 8.7   MG 2.1     Recent Labs     04/24/21  2104   ALT 30   AP 99   TBILI 0.7   TP 6.8   ALB 3.8   GLOB 3.0   LPSE 118     No results for input(s): INR, PTP, APTT, INREXT in the last 72 hours. No results for input(s): FE, TIBC, PSAT, FERR in the last 72 hours. No results found for: FOL, RBCF   No results for input(s): PH, PCO2, PO2 in the last 72 hours. No results for input(s): CPK, CKNDX, TROIQ in the last 72 hours.     No lab exists for component: CPKMB  No results found for: CHOL, CHOLX, CHLST, CHOLV, HDL, HDLP, LDL, LDLC, DLDLP, TGLX, TRIGL, TRIGP, CHHD, CHHDX  No results found for: [facility]  Lab Results   Component Value Date/Time    Color YELLOW/STRAW 04/24/2021 09:50 PM    Appearance HAZY (A) 04/24/2021 09:50 PM    Specific gravity 1.025 04/24/2021 09:50 PM    pH (UA) 6.0 04/24/2021 09:50 PM    Protein Negative 04/24/2021 09:50 PM    Glucose Negative 04/24/2021 09:50 PM    Ketone Negative 04/24/2021 09:50 PM    Bilirubin Negative 04/24/2021 09:50 PM    Urobilinogen 0.2 04/24/2021 09:50 PM    Nitrites Negative 04/24/2021 09:50 PM    Leukocyte Esterase Negative 04/24/2021 09:50 PM    Epithelial cells FEW 04/24/2021 09:50 PM    Bacteria Negative 04/24/2021 09:50 PM    WBC 0-4 04/24/2021 09:50 PM    RBC  04/24/2021 09:50 PM

## 2021-04-25 NOTE — H&P
HISTORY AND PHYSICAL      PCP: Kylee, MD Anna  History source: ER report, the patient is a poor historian      CC: abdominal pain      HPI: 80 y.o man with dementia who presents with right flank pain. He developed abdominal and right flank pain yesterday. No known exacerbating or alleviating factors. Associated symptoms include dark urine. No known fever or dysuria. No n/v/d. He recently moved to assisted living. Family indicated that he's been having symptoms of confusion that have worsened over the past 4 days. PMH/PSH:  Past Medical History:   Diagnosis Date    Arthritis     Asthma     Spinal stenosis of lumbar region with radiculopathy 6/26/2013     Past Surgical History:   Procedure Laterality Date    HX HEENT      T & A    HX OTHER SURGICAL      cyst removed from back       Home meds:   Prior to Admission medications    Medication Sig Start Date End Date Taking? Authorizing Provider   oxyCODONE-acetaminophen (PERCOCET) 5-325 mg per tablet Take 1-2 Tabs by mouth every four (4) hours as needed for Pain. 6/28/13   Brian Cramp B, PA-C   budesonide (PULMICORT FLEXHALER) 180 mcg/actuation AePB inhaler Take 2 Puffs by inhalation daily. Pt. Instructed to take the morning of surgery. Provider, Historical   mometasone (NASONEX) 50 mcg/actuation nasal spray 1 [place] by Both Nostrils route daily. Provider, Historical   aspirin delayed-release 81 mg tablet Take 81 mg by mouth daily. Provider, Historical   multivitamin (ONE A DAY) tablet Take 1 Tab by mouth daily. Provider, Historical   omega-3 fatty acids-vitamin e (FISH OIL) 1,000 mg cap Take 1 Cap by mouth daily. Provider, Historical   GLUCOSAMINE HCL/CHONDR CANTU A NA (GLUCOSAMINE-CHONDROITIN) 750-600 mg Tab Take  by mouth daily. Provider, Historical       Allergies:  No Known Allergies    FH:  No family history on file. SH:  Social History     Tobacco Use    Smoking status: Never Smoker   Substance Use Topics    Alcohol use:  Yes Comment: occ. ROS: Review of systems not obtained due to patient factors. PHYSICAL EXAM:  Visit Vitals  BP (!) 161/96 (BP 1 Location: Left upper arm, BP Patient Position: At rest)   Pulse 80   Temp 98 °F (36.7 °C)   Resp 17   Wt 67.2 kg (148 lb 2.4 oz)   SpO2 96%   BMI 21.26 kg/m²       Gen: NAD, non-toxic, sleeping comfortably  HEENT: normocephalic atraumatic  Neck: supple, trachea midline, no adenopathy  Heart: RRR, no MRG, no JVD, no peripheral edema  Lungs: CTA b/l, non-labored respirations  Abd: soft, NT, ND, BS+. Orta catheter bag with tea-colored urine  Extr: warm  Skin: dry, no rash  Neuro/psych: sleeping at this time not anxious nor agitated    Labs/Imaging:  Recent Results (from the past 24 hour(s))   CBC WITH AUTOMATED DIFF    Collection Time: 04/24/21  9:04 PM   Result Value Ref Range    WBC 8.6 4.1 - 11.1 K/uL    RBC 4.57 4.10 - 5.70 M/uL    HGB 14.1 12.1 - 17.0 g/dL    HCT 41.8 36.6 - 50.3 %    MCV 91.5 80.0 - 99.0 FL    MCH 30.9 26.0 - 34.0 PG    MCHC 33.7 30.0 - 36.5 g/dL    RDW 13.1 11.5 - 14.5 %    PLATELET 234 893 - 535 K/uL    MPV 10.1 8.9 - 12.9 FL    NRBC 0.0 0  WBC    ABSOLUTE NRBC 0.00 0.00 - 0.01 K/uL    NEUTROPHILS 77 (H) 32 - 75 %    LYMPHOCYTES 17 12 - 49 %    MONOCYTES 2 (L) 5 - 13 %    EOSINOPHILS 3 0 - 7 %    BASOPHILS 1 0 - 1 %    IMMATURE GRANULOCYTES 0 0.0 - 0.5 %    ABS. NEUTROPHILS 6.6 1.8 - 8.0 K/UL    ABS. LYMPHOCYTES 1.5 0.8 - 3.5 K/UL    ABS. MONOCYTES 0.2 0.0 - 1.0 K/UL    ABS. EOSINOPHILS 0.2 0.0 - 0.4 K/UL    ABS. BASOPHILS 0.1 0.0 - 0.1 K/UL    ABS. IMM.  GRANS. 0.0 0.00 - 0.04 K/UL    DF AUTOMATED     METABOLIC PANEL, COMPREHENSIVE    Collection Time: 04/24/21  9:04 PM   Result Value Ref Range    Sodium 146 (H) 136 - 145 mmol/L    Potassium 4.0 3.5 - 5.1 mmol/L    Chloride 107 97 - 108 mmol/L    CO2 26 21 - 32 mmol/L    Anion gap 13 5 - 15 mmol/L    Glucose 175 (H) 65 - 100 mg/dL    BUN 29 (H) 6 - 20 MG/DL    Creatinine 0.86 0.70 - 1.30 MG/DL BUN/Creatinine ratio 34 (H) 12 - 20      GFR est AA >60 >60 ml/min/1.73m2    GFR est non-AA >60 >60 ml/min/1.73m2    Calcium 8.7 8.5 - 10.1 MG/DL    Bilirubin, total 0.7 0.2 - 1.0 MG/DL    ALT (SGPT) 30 12 - 78 U/L    AST (SGOT) 16 15 - 37 U/L    Alk. phosphatase 99 45 - 117 U/L    Protein, total 6.8 6.4 - 8.2 g/dL    Albumin 3.8 3.5 - 5.0 g/dL    Globulin 3.0 2.0 - 4.0 g/dL    A-G Ratio 1.3 1.1 - 2.2     LIPASE    Collection Time: 04/24/21  9:04 PM   Result Value Ref Range    Lipase 118 73 - 393 U/L   MAGNESIUM    Collection Time: 04/24/21  9:04 PM   Result Value Ref Range    Magnesium 2.1 1.6 - 2.4 mg/dL   URINALYSIS W/MICROSCOPIC    Collection Time: 04/24/21  9:50 PM   Result Value Ref Range    Color YELLOW/STRAW      Appearance HAZY (A) CLEAR      Specific gravity 1.025 1.003 - 1.030      pH (UA) 6.0 5.0 - 8.0      Protein Negative NEG mg/dL    Glucose Negative NEG mg/dL    Ketone Negative NEG mg/dL    Bilirubin Negative NEG      Blood MODERATE (A) NEG      Urobilinogen 0.2 0.2 - 1.0 EU/dL    Nitrites Negative NEG      Leukocyte Esterase Negative NEG      WBC 0-4 0 - 4 /hpf    RBC  0 - 5 /hpf    Epithelial cells FEW FEW /lpf    Bacteria Negative NEG /hpf    Mucus TRACE (A) NEG /lpf   URINE CULTURE HOLD SAMPLE    Collection Time: 04/24/21  9:50 PM    Specimen: Serum; Urine   Result Value Ref Range    Urine culture hold        Urine on hold in Microbiology dept for 2 days. If unpreserved urine is submitted, it cannot be used for addtional testing after 24 hours, recollection will be required.    LACTIC ACID    Collection Time: 04/24/21  9:50 PM   Result Value Ref Range    Lactic acid 1.7 0.4 - 2.0 MMOL/L       Recent Labs     04/24/21 2104   WBC 8.6   HGB 14.1   HCT 41.8        Recent Labs     04/24/21  2104   *   K 4.0      CO2 26   BUN 29*   CREA 0.86   *   CA 8.7   MG 2.1     Recent Labs     04/24/21 2104   ALT 30   AP 99   TBILI 0.7   TP 6.8   ALB 3.8   GLOB 3.0   LPSE 118 No results for input(s): CPK, CKNDX, TROIQ in the last 72 hours. No lab exists for component: CPKMB    No results for input(s): INR, PTP, APTT, INREXT in the last 72 hours. No results for input(s): PH, PCO2, PO2 in the last 72 hours. Ct Abd Pelv W Cont    Result Date: 4/24/2021  Significant bladder distention distention most likely related to chronic bladder outlet obstruction. Imaging findings suggestive of a mild diffuse enterocolitis. Consider infectious and inflammatory etiologies. Assessment & Plan:     Flank pain: suspect due to urinary retention. Imaging shows possible mild enterocolitis, although limited history obtained is not consistent with this.  -continue rodriguez catheter  -monitor off antibiotics  -pain control PRN  -he would benefit from seeing urology at least as an outpatient    Dementia with behavioral disturbances: this is likely worsened by urinary retention. He recently moved to assisted living but may need a higher level of care such as memory care. Will consult case management.     Hematuria: related to rodriguez catheter insertion?  -monitor    History of right inguinal hernia repair in January 2021    DVT ppx: SCDs  Code status: full  Disposition: TBD    Signed By: Janice Valdivia MD     April 25, 2021

## 2021-04-26 LAB
ANION GAP SERPL CALC-SCNC: 6 MMOL/L (ref 5–15)
BACTERIA SPEC CULT: NORMAL
BUN SERPL-MCNC: 18 MG/DL (ref 6–20)
BUN/CREAT SERPL: 24 (ref 12–20)
CALCIUM SERPL-MCNC: 8.4 MG/DL (ref 8.5–10.1)
CHLORIDE SERPL-SCNC: 111 MMOL/L (ref 97–108)
CO2 SERPL-SCNC: 25 MMOL/L (ref 21–32)
CREAT SERPL-MCNC: 0.76 MG/DL (ref 0.7–1.3)
GLUCOSE SERPL-MCNC: 111 MG/DL (ref 65–100)
POTASSIUM SERPL-SCNC: 3.8 MMOL/L (ref 3.5–5.1)
SERVICE CMNT-IMP: NORMAL
SODIUM SERPL-SCNC: 142 MMOL/L (ref 136–145)

## 2021-04-26 PROCEDURE — 74011250637 HC RX REV CODE- 250/637: Performed by: INTERNAL MEDICINE

## 2021-04-26 PROCEDURE — 74011250637 HC RX REV CODE- 250/637: Performed by: HOSPITALIST

## 2021-04-26 PROCEDURE — 65270000029 HC RM PRIVATE

## 2021-04-26 PROCEDURE — 36415 COLL VENOUS BLD VENIPUNCTURE: CPT

## 2021-04-26 PROCEDURE — 74011250636 HC RX REV CODE- 250/636: Performed by: HOSPITALIST

## 2021-04-26 PROCEDURE — 80048 BASIC METABOLIC PNL TOTAL CA: CPT

## 2021-04-26 RX ORDER — POLYETHYLENE GLYCOL 3350 17 G/17G
17 POWDER, FOR SOLUTION ORAL DAILY
Status: DISCONTINUED | OUTPATIENT
Start: 2021-04-26 | End: 2021-04-28 | Stop reason: HOSPADM

## 2021-04-26 RX ADMIN — HALOPERIDOL LACTATE 0.5 MG: 5 INJECTION, SOLUTION INTRAMUSCULAR at 21:45

## 2021-04-26 RX ADMIN — POLYETHYLENE GLYCOL 3350 17 G: 17 POWDER, FOR SOLUTION ORAL at 14:54

## 2021-04-26 RX ADMIN — LEVOTHYROXINE SODIUM 50 MCG: 0.05 TABLET ORAL at 08:29

## 2021-04-26 RX ADMIN — FINASTERIDE 5 MG: 5 TABLET, FILM COATED ORAL at 11:32

## 2021-04-26 RX ADMIN — SODIUM CHLORIDE 50 ML/HR: 9 INJECTION, SOLUTION INTRAVENOUS at 13:03

## 2021-04-26 RX ADMIN — ACETAMINOPHEN 650 MG: 325 TABLET, FILM COATED ORAL at 08:29

## 2021-04-26 RX ADMIN — TAMSULOSIN HYDROCHLORIDE 0.4 MG: 0.4 CAPSULE ORAL at 08:29

## 2021-04-26 RX ADMIN — THERA TABS 1 TABLET: TAB at 08:29

## 2021-04-26 RX ADMIN — SERTRALINE 50 MG: 50 TABLET, FILM COATED ORAL at 08:29

## 2021-04-26 NOTE — PROGRESS NOTES
2000 Report received from [facility], [facility]. Patient very confused, attempting to get out bed several times while staff is in room. Attempted to reorient patient but to no avail. Patient already close to the nurse's station and supervisor made aware of safety risk. Sitter in room. Patient now trying to pull on his rodriguez. Haldol given early, rodriguez resecured. Will continue to monitor. [id number] Bedside shift change report given to Day Guthrie by Ayana Brian RN. Report included the following information SBAR, Kardex, MAR, Accordion, and Recent Results.

## 2021-04-26 NOTE — PROGRESS NOTES
Bedside shift change report given to [address] (oncoming nurse) by Lou Pascual (offgoing nurse). Report included the following information SBAR, Kardex, Intake/Output and MAR.

## 2021-04-26 NOTE — PROGRESS NOTES
Hospitalist Progress Note              Molly Slade MD.                                                             Cell: [phone]                               NAME:  Jessica Bronson  :  3/23/1933  MRN:  [id number]  Date of Service:  2021    Summary: 80 y.o. male who presented on 2021 with right flank pain. CT scan performed demonstrated significant bladder distention distention most likely related to chronic bladder outlet obstruction. Imaging findings suggestive of a mild diffuse enterocolitis.           Assessment/Plan:  Bladder outlet obstruction; S/p rodriguez catheter insertion  Likely due to BPH  Consult urology    Dementia with acute behavioral disturbance: this was precipitated by bladder obstruction  Supportive care  F/u with . May need referral to memory care    Hematuria; Due to #1       Patient medically cleared for discharge   Son arranging  care      Code status: Full, recommend DNR  DVT prophylaxsis: SCD  Dispo: tbd         Interval History/Subjective:    F/u for right flank pain   No new complaints    Review of Systems:  A comprehensive review of systems was negative except for that written in the HPI. Objective:     VITALS:   Last 24hrs VS reviewed since prior progress note. Most recent are:  Visit Vitals  /74 (BP 1 Location: Right upper arm, BP Patient Position: At rest)   Pulse 90   Temp 97.6 °F (36.4 °C)   Resp 16   Wt 67.2 kg (148 lb 2.4 oz)   SpO2 95%   BMI 21.26 kg/m²       Intake/Output Summary (Last 24 hours) at 2021 1626  Last data filed at 2021 1553  Gross per 24 hour   Intake 1205 ml   Output 3450 ml   Net -2245 ml        PHYSICAL EXAM:  General: No acute distress, cooperative, pleasant   EENT: EOMI. Anicteric sclerae. Oral mucous moist, oropharynx benign  Resp: CTA bilaterally. No wheezing/rhonchi/rales.  No accessory muscle use  CV: Regular rhythm, normal rate, no murmurs, gallops, rubs  GI: Soft, non distended, non tender. normoactive bowel sounds, no hepatosplenomegaly Extremities: No edema, warm, 2+ pulses throughout  Neurologic: Moves all extremities. AAOx3, CN II-XII grossly intact  Psych: Good insight. Not anxious nor agitated. Skin: Good Turgor, no rashes or ulcers    Lab Data Personally Reviewed: (see below)     Medications list Personally Reviewed:  x YES  NO     _______________________________________________________________________  Care Plan discussed with:  Patient/Family and Nurse    Total NON critical care TIME:  27 minutes    Kei Akbar MD     Procedures: see electronic medical records for all procedures/Xrays and details which were not copied into this note but were reviewed prior to creation of Plan. LABS:  Recent Labs     04/24/21  2104   WBC 8.6   HGB 14.1   HCT 41.8        Recent Labs     04/26/21  0001 04/24/21  2104    146*   K 3.8 4.0   * 107   CO2 25 26   BUN 18 29*   CREA 0.76 0.86   * 175*   CA 8.4* 8.7   MG  --  2.1     Recent Labs     04/24/21  2104   ALT 30   AP 99   TBILI 0.7   TP 6.8   ALB 3.8   GLOB 3.0   LPSE 118     No results for input(s): INR, PTP, APTT, INREXT, INREXT in the last 72 hours. No results for input(s): FE, TIBC, PSAT, FERR in the last 72 hours. No results found for: FOL, RBCF   No results for input(s): PH, PCO2, PO2 in the last 72 hours. No results for input(s): CPK, CKNDX, TROIQ in the last 72 hours.     No lab exists for component: CPKMB  No results found for: CHOL, CHOLX, CHLST, CHOLV, HDL, HDLP, LDL, LDLC, DLDLP, TGLX, TRIGL, TRIGP, CHHD, CHHDX  No results found for: [facility]  Lab Results   Component Value Date/Time    Color YELLOW/STRAW 04/24/2021 09:50 PM    Appearance HAZY (A) 04/24/2021 09:50 PM    Specific gravity 1.025 04/24/2021 09:50 PM    pH (UA) 6.0 04/24/2021 09:50 PM    Protein Negative 04/24/2021 09:50 PM    Glucose Negative 04/24/2021 09:50 PM    Ketone Negative 04/24/2021 09:50 PM Bilirubin Negative 04/24/2021 09:50 PM    Urobilinogen 0.2 04/24/2021 09:50 PM    Nitrites Negative 04/24/2021 09:50 PM    Leukocyte Esterase Negative 04/24/2021 09:50 PM    Epithelial cells FEW 04/24/2021 09:50 PM    Bacteria Negative 04/24/2021 09:50 PM    WBC 0-4 04/24/2021 09:50 PM    RBC  04/24/2021 09:50 PM

## 2021-04-27 LAB — SARS-COV-2, COV2: NORMAL

## 2021-04-27 PROCEDURE — 65270000029 HC RM PRIVATE

## 2021-04-27 PROCEDURE — 97535 SELF CARE MNGMENT TRAINING: CPT

## 2021-04-27 PROCEDURE — 97116 GAIT TRAINING THERAPY: CPT

## 2021-04-27 PROCEDURE — 74011250637 HC RX REV CODE- 250/637: Performed by: HOSPITALIST

## 2021-04-27 PROCEDURE — U0005 INFEC AGEN DETEC AMPLI PROBE: HCPCS

## 2021-04-27 PROCEDURE — 97161 PT EVAL LOW COMPLEX 20 MIN: CPT

## 2021-04-27 PROCEDURE — 74011250637 HC RX REV CODE- 250/637: Performed by: INTERNAL MEDICINE

## 2021-04-27 PROCEDURE — 97166 OT EVAL MOD COMPLEX 45 MIN: CPT

## 2021-04-27 RX ADMIN — TAMSULOSIN HYDROCHLORIDE 0.4 MG: 0.4 CAPSULE ORAL at 08:32

## 2021-04-27 RX ADMIN — FINASTERIDE 5 MG: 5 TABLET, FILM COATED ORAL at 16:13

## 2021-04-27 RX ADMIN — LEVOTHYROXINE SODIUM 50 MCG: 0.05 TABLET ORAL at 08:32

## 2021-04-27 RX ADMIN — ACETAMINOPHEN 650 MG: 325 TABLET, FILM COATED ORAL at 18:39

## 2021-04-27 RX ADMIN — SERTRALINE 50 MG: 50 TABLET, FILM COATED ORAL at 08:32

## 2021-04-27 RX ADMIN — Medication 10 ML: at 19:47

## 2021-04-27 RX ADMIN — POLYETHYLENE GLYCOL 3350 17 G: 17 POWDER, FOR SOLUTION ORAL at 08:32

## 2021-04-27 RX ADMIN — THERA TABS 1 TABLET: TAB at 08:32

## 2021-04-27 NOTE — PROGRESS NOTES
Problem: Self Care Deficits Care Plan (Adult)  Goal: *Acute Goals and Plan of Care (Insert Text)  Description:   FUNCTIONAL STATUS PRIOR TO ADMISSION: Patient was modified independent with no AD, increased time only, for functional mobility. Patient was S-independent for basic and instrumental ADLs per family, poor historian due to confusion on evaluation. [facility] with [facility] assist 9-5p. HOME SUPPORT: The patient lived with wife but did not require assist.    Occupational Therapy Goals  Initiated 4/27/2021  1. Patient will perform grooming with supervision/set-up within 7 day(s). 2.  Patient will perform upper body dressing with supervision/set-up within 7 day(s). 3.  Patient will perform lower body dressing with supervision/set-up within 7 day(s). 4.  Patient will perform toilet transfers with supervision/set-up within 7 day(s). 5.  Patient will perform all aspects of toileting with supervision/set-up within 7 day(s). 6.  Patient will participate in upper extremity therapeutic exercise/activities with supervision/set-up for 5 minutes within 7 day(s).                Outcome: Progressing Towards Goal   OCCUPATIONAL THERAPY EVALUATION  Patient: Nitish Burdick (2 y.o. male)  Date: 4/27/2021  Primary Diagnosis: Abdominal pain [R10.9]        Precautions: high fall       ASSESSMENT  Based on the objective data described below, the patient presents with decreased activity tolerance and independence with self care s/p admission with abdominal pain and bladder obstruction now with rodriguez, currently min A with RW vs HHA x2 with max safety cues for initiation to task and safety, oriented x1 to self only, decreased balance and strength, mild active tremors in B UE with activities requiring a 3-5 second delay on command following, impulsive when alone with impaired cognition limiting insight into deficits, safety or need for assist. Noted PMH of dementia with behavior disturbance and mild sundowning, Expect patient does well with wife/home care staff at [facility]. Recommend return to [facility] with 24/7 care with [facility], recommend home care staff A with all ADls including bathing with shower chair. Current Level of Function Impacting Discharge (ADLs/self-care): total A-max A for LB ADls with rodriguez, mod A to setup for UB ADLs, min A for mobility with RW, S for safety    Functional Outcome Measure: The patient scored Total: 30/100 on the Barthel Index outcome measure which is indicative of 70% impaired ability to care for basic self needs/dependency on others; inferred 100% dependency on others for instrumental ADLs. Other factors to consider for discharge: family wishing to provide 24/7 care, currently has 9-5p aide care     Patient will benefit from skilled therapy intervention to address the above noted impairments. PLAN :  Recommendations and Planned Interventions: self care training, functional mobility training, therapeutic exercise, balance training, therapeutic activities, cognitive retraining, endurance activities, patient education, and home safety training    Frequency/Duration: Patient will be followed by occupational therapy 3 times a week to address goals. Recommendation for discharge: (in order for the patient to meet his/her long term goals)  To be determined: recommend return to [facility] with wife and increased aide care with 24/7 S, vs. SNF if [facility] does not approve 24/7 care    This discharge recommendation:  Has been made in collaboration with the attending provider and/or case management    IF patient discharges home will need the following DME: shower chair       SUBJECTIVE:   Patient stated Ok ok.  re: minimal communication    OBJECTIVE DATA SUMMARY:   HISTORY:   Past Medical History:   Diagnosis Date    Arthritis     Asthma     Spinal stenosis of lumbar region with radiculopathy 6/26/2013     Past Surgical History:   Procedure Laterality Date    HX HEENT      T & A    HX OTHER SURGICAL      cyst removed from back       Expanded or extensive additional review of patient history:          Hand dominance: Right    EXAMINATION OF PERFORMANCE DEFICITS:  Cognitive/Behavioral Status:  Neurologic State: Alert  Orientation Level: Disoriented to person;Disoriented to place; Disoriented to situation;Disoriented to time  Cognition: Memory loss;Decreased command following;Decreased attention/concentration        Safety/Judgement: Decreased awareness of environment;Decreased awareness of need for assistance;Decreased insight into deficits; Decreased awareness of need for safety    Skin: intact    Edema: none noted    Hearing: Auditory  Auditory Impairment: Hard of hearing, bilateral    Vision/Perceptual:                 Intact to close vision, unable to further assess                    Range of Motion:  B UE  AROM: Generally decreased, functional                         Strength:  B UE  Strength: Generally decreased, functional                Coordination:  Coordination: Generally decreased, functional  Fine Motor Skills-Upper: Left Intact; Right Intact         Tone & Sensation:    Tone: Abnormal(ridigity B UE)                         Balance:  Sitting: Intact; High guard(impulsivity with impaired cognition)  Standing: Impaired; With support  Standing - Static: Constant support; Fair  Standing - Dynamic : Constant support;Poor    Functional Mobility and Transfers for ADLs:  Bed Mobility:  Supine to Sit: Contact guard assistance    Transfers:  Sit to Stand: Contact guard assistance  Stand to Sit: Contact guard assistance  Bed to Chair: Minimum assistance  Bathroom Mobility: Minimum assistance  Toilet Transfer : Minimum assistance    ADL Assessment:  Feeding: Setup; Additional time(initiation cues)    Oral Facial Hygiene/Grooming: Additional time;Minimum assistance(initiation cues, cognition limiting, [place] prompting)    Bathing: Maximum assistance    Upper Body Dressing: Minimum assistance; Additional time(seated, initiation cues)    Lower Body Dressing: Maximum assistance; Additional time    Toileting: Total assistance(rodriguez/max A for hygiene/CM, initiation cues)      Completed OT evaluation and ADLs seated EOB and standing as able with HHA x2 or RW for balance. Educated on safety and endurance training with encouragement for full participation in ADLs while in hospital. Good understanding from family, poor  understanding noted from patient due to impaired cognition. ADL Intervention and task modifications:  Feeding  Feeding Assistance: (cues to initiate, mild tremor with activity)  Container Management: Moderate assistance  Cutting Food: Moderate assistance  Utensil Management: Supervision  Food to Mouth: Supervision  Drink to Mouth: Supervision    Grooming  Position Performed: Seated in chair  Washing Face: Set-up  Washing Hands: Supervision  Brushing Teeth: Set-up  Brushing/Combing Hair: Set-up    Upper Body Bathing  Bathing Assistance: Minimum assistance  Position Performed: Seated in chair  Cues: Physical assistance; Tactile cues provided;Verbal cues provided;Visual cues provided;Visual/perceptual training/retraining    Lower Body Bathing  Perineal  : Total assistance (dependent)  Position Performed: Seated in chair    Upper Body Dressing Assistance  Pullover Shirt: Minimum assistance    Lower Body Dressing Assistance  Pants With Elastic Waist: Maximum assistance  Position Performed: Seated in chair    Toileting  Bladder Hygiene: Total assistance (dependent)  Clothing Management: Total assistance (dependent)    Cognitive Retraining  Safety/Judgement: Decreased awareness of environment;Decreased awareness of need for assistance;Decreased insight into deficits; Decreased awareness of need for safety    Therapeutic Exercise:     Functional Measure:  Barthel Index:    Bathin  Bladder: 0  Bowels: 5  Groomin  Dressin  Feedin  Mobility: 0  Stairs: 0  Toilet Use: 5  Transfer (Bed to Chair and Back): 10  Total: 30/100        The Barthel ADL Index: Guidelines  1. The index should be used as a record of what a patient does, not as a record of what a patient could do. 2. The main aim is to establish degree of independence from any help, physical or verbal, however minor and for whatever reason. 3. The need for supervision renders the patient not independent. 4. A patient's performance should be established using the best available evidence. Asking the patient, friends/relatives and nurses are the usual sources, but direct observation and common sense are also important. However direct testing is not needed. 5. Usually the patient's performance over the preceding 24-48 hours is important, but occasionally longer periods will be relevant. 6. Middle categories imply that the patient supplies over 50 per cent of the effort. 7. Use of aids to be independent is allowed. Yoshi Soni., Barthel, DLILA. ([id number]). Functional evaluation: the Barthel Index. [address] [facility] (14)2. Simeon Niño johnnie KEVIN Clark, John Blooddemetrio., [facility] Sudeep., [place], [address] [place] [address] (1999). Measuring the change indisability after inpatient rehabilitation; comparison of the responsiveness of the Barthel Index and Functional [place] Measure. Journal of Neurology, Neurosurgery, and Psychiatry, 66(4), 299-349. Naman Friedman, N.J.A, DAYDAY Cortes, & Jimena Orozco MNorahA. (2004.) Assessment of post-stroke quality of life in cost-effectiveness studies: The usefulness of the Barthel Index and the EuroQoL-5D.  Quality of Life Research, 15, 863-34         Occupational Therapy Evaluation Charge Determination   History Examination Decision-Making   MEDIUM Complexity : Expanded review of history including physical, cognitive and psychosocial  history  HIGH Complexity : 5 or more performance deficits relating to physical, cognitive , or psychosocial skils that result in activity limitations and / or participation restrictions MEDIUM Complexity : Patient may present with comorbidities that affect occupational performnce. Miniml to moderate modification of tasks or assistance (eg, physical or verbal ) with assesment(s) is necessary to enable patient to complete evaluation       Based on the above components, the patient evaluation is determined to be of the following complexity level: MEDIUM  Pain Rating:  None noted    Activity Tolerance:   Fair and requires rest breaks    After treatment patient left in no apparent distress:    Sitting in chair, Call bell within reach, Bed / chair alarm activated, and Caregiver / family present    COMMUNICATION/EDUCATION:   The patients plan of care was discussed with: Physical therapist and Registered nurse. /CM    Home safety education was provided and the patient/caregiver indicated understanding., Patient/family have participated as able in goal setting and plan of care. , and Patient/family agree to work toward stated goals and plan of care. This patients plan of care is appropriate for delegation to [facility].     Thank you for this referral.  Iggy Mayorga, OT  Time Calculation: 28 mins

## 2021-04-27 NOTE — PROGRESS NOTES
Bedside shift change report given to Jenna Dimas RN (oncoming nurse) by Denzel Parker (offgoing nurse). Report included the following information SBAR, Kardex, Procedure Summary, Intake/Output, MAR and Recent Results.

## 2021-04-27 NOTE — PROGRESS NOTES
JERO: Plan for return to [facility] with 24/7 private duty care which is being arranged by family. Order for rolling walker has been sent to [employer]. The patient will be discharged with a rodriguez. S transport at discharge. Chart reviewed. CM met with patient and son Yuly Chin #[phone]  at bedside. Patient has dementia. Patient and wife recently moved into [address] [place] [address]. Patient has private duty care through [facility], M-F 9AM-5 PM. The facility staff and the caregiver assists with ADLs. Patient does not use any DME. The family is requesting SNF. Patient had the COVID vaccine (J&J) on March 24. CM spoke with MD who is in agreement with SNF placement. Patient must be sitter free for 24 hours before he can be discharged to a SNF. Sitter was discharged at 10 AM this morning. Family prefers 1)[facility] 2)[facility] and [address] [facility]. [facility] has accepted, Referral still pending with [facility]il[facility] Morgan, and [facility]la Steve has denied. CM met with son, family now prefers patient return to [facility] and increase caregivers to 24/7. CM called [facility] # [phone] and spoke with RUFUS Burris. They plan to come to the hospital this afternoon to eval patient and ensure patient is appropriate to return to [facility]. CM updated son at bedside. CM also updated Leodis Mallet at [address].    3:30 PM: CM met with the son who stated that the [facility] came and evaluated patient and confirmed they can accept patient back. CM called facility and left message for the DON to call this CM back to confirm. CM to confirm whether they will arrange home health PT/OT. CM sent order for rolling walker to [employer]. Care Management Interventions  PCP Verified by CM:  Yes  Mode of Transport at Discharge: BLS  Transition of Care Consult (CM Consult): Discharge Planning, Assisted Living  Discharge Durable Medical Equipment: No  Physical Therapy Consult: Yes  Occupational Therapy Consult: Yes  Speech Therapy Consult: No  Current Support Network: Assisted Living, Family Lives Nearby  Confirm Follow Up Transport: Other (see comment)(BLS)  The Patient and/or Patient Representative was Provided with a Choice of Provider and Agrees with the Discharge Plan?: Yes  Freedom of Choice List was Provided with Basic Dialogue that Supports the Patient's Individualized Plan of Care/Goals, Treatment Preferences and Shares the Quality Data Associated with the Providers?: Yes  Discharge Location  Discharge Placement: Assisted Living(vs. snf )      ROSE Thao/CRM

## 2021-04-27 NOTE — PROGRESS NOTES
Problem: Mobility Impaired (Adult and Pediatric)  Goal: *Acute Goals and Plan of Care (Insert Text)  Description: FUNCTIONAL STATUS PRIOR TO ADMISSION: Patient was modified independent with no AD, increased time only, for functional mobility. Pt required supervision for safety secondary to poor safety awareness. HOME SUPPORT PRIOR TO ADMISSION: Pt and wife recently moved into [facility] with aide assist 9-5 pm.    Physical Therapy Goals  Initiated 4/27/2021  1. Patient will move from supine to sit and sit to supine , scoot up and down, and roll side to side in bed with supervision/set-up within 7 day(s). 2.  Patient will transfer from bed to chair and chair to bed with supervision/set-up using the least restrictive device within 7 day(s). 3.  Patient will perform sit to stand with supervision/set-up within 7 day(s). 4.  Patient will ambulate with supervision/set-up for > 300 feet with the least restrictive device within 7 day(s). 5.  Patient will ascend/descend 4 stairs with one handrail(s) with minimal assistance/contact guard assist within 7 day(s). PHYSICAL THERAPY EVALUATION  Patient: Akua Granda (5 y.o. male)  Date: 4/27/2021  Primary Diagnosis: Abdominal pain [R10.9]        Precautions:   Fall; hx of dementia with behavior disturbance and mild sundowning    ASSESSMENT  Based on the objective data described below, the patient presents with hx of dementia with confusion - oriented to name only, poor safety awareness, decreased insight, impulsivity, decreased memory, decline in functional mobility - now with rodriguez after bladder obstruction and impaired standing balance/gait - fall risk. Pt recently moved to [facility] with wife and had paid caregiver to assist M_F - 9 - 5pm.  Pt demonstrated improved balance with use of RW. Recommend return to [facility] with 24 hour care for safety/assist as needed and home PT. Son present and in agreement with plan.      Current Level of Function Impacting Discharge (mobility/balance): Supervision to min assist for functional mobility - including use of RW    Functional Outcome Measure: The patient scored 30/100 on the Barthel Index outcome measure. Other factors to consider for discharge: Pt will benefit from returning to known environment and wife/caregivers     Patient will benefit from skilled therapy intervention to address the above noted impairments. PLAN :  Recommendations and Planned Interventions: bed mobility training, transfer training, gait training, therapeutic exercises, patient and family training/education, and therapeutic activities      Frequency/Duration: Patient will be followed by physical therapy:  5 times a week to address goals. Recommendation for discharge: (in order for the patient to meet his/her long term goals)  Physical therapy at least 2 days/week in the home AND 24 hr caregiver assist to ensure safety and assist with functional mobility    This discharge recommendation:  Has been made in collaboration with the attending provider and/or case management    IF patient discharges home will need the following DME: rolling walker         SUBJECTIVE:   Patient stated I think My wife is over here.     OBJECTIVE DATA SUMMARY:   HISTORY:    Past Medical History:   Diagnosis Date    Arthritis     Asthma     Spinal stenosis of lumbar region with radiculopathy 6/26/2013     Past Surgical History:   Procedure Laterality Date    HX HEENT      T & A    HX OTHER SURGICAL      cyst removed from back       Personal factors and/or comorbidities impacting plan of care: per further chart review dementia     Home Situation  Home Environment: Assisted living  Living Alone: No(Lives with wife)  Support Systems: Child(nakia)  Patient Expects to be Discharged to[de-identified] Assisted living(with 24 hour paid caregiver)  Current DME Used/Available at Home: None    EXAMINATION/PRESENTATION/DECISION MAKING:   Critical Behavior:  Neurologic State: Alert, Confused  Orientation Level: Oriented to person, Disoriented to place, Disoriented to situation, Disoriented to time  Cognition: Decreased attention/concentration, Decreased command following, Impaired decision making, Memory loss, Poor safety awareness  Safety/Judgement: Decreased awareness of environment, Decreased awareness of need for assistance, Decreased awareness of need for safety, Decreased insight into deficits, Lack of insight into deficits  Hearing: Auditory  Auditory Impairment: Hard of hearing, bilateral  Range Of Motion:  AROM: Generally decreased, functional                       Strength:    Strength: Generally decreased, functional                    Tone & Sensation:   Tone: Abnormal(ridigity B UE)                              Coordination:  Coordination: Generally decreased, functional  Functional Mobility:  Bed Mobility:     Supine to Sit: Contact guard assistance        Transfers:  Sit to Stand: Minimum assistance(requires cues to move forward)  Stand to Sit: Minimum assistance(to safely reach back to chair)        Bed to Chair: Minimum assistance;Assist x1              Balance:   Sitting: Intact; High guard(impulsivity with impaired cognition)  Standing: Impaired; With support  Standing - Static: Good;Constant support  Standing - Dynamic : Fair;Constant support  Ambulation/Gait Training:  Distance (ft): 150 Feet (ft)     Ambulation - Level of Assistance: Contact guard assistance;Minimal assistance; Additional time; Adaptive equipment        Gait Abnormalities: Decreased step clearance; Path deviations        Base of Support: Narrowed     Speed/Bhavani: Slow  Step Length: Right shortened;Left shortened                     Functional Measure:  Barthel Index:    Bathin  Bladder: 0  Bowels: 5  Groomin  Dressin  Feedin  Mobility: 0  Stairs: 0  Toilet Use: 5  Transfer (Bed to Chair and Back): 10  Total: 30/100       The Barthel ADL Index: Guidelines  1.  The index should be used as a record of what a patient does, not as a record of what a patient could do. 2. The main aim is to establish degree of independence from any help, physical or verbal, however minor and for whatever reason. 3. The need for supervision renders the patient not independent. 4. A patient's performance should be established using the best available evidence. Asking the patient, friends/relatives and nurses are the usual sources, but direct observation and common sense are also important. However direct testing is not needed. 5. Usually the patient's performance over the preceding 24-48 hours is important, but occasionally longer periods will be relevant. 6. Middle categories imply that the patient supplies over 50 per cent of the effort. 7. Use of aids to be independent is allowed. Anay Sinclair., Barthel, D.W. ([id number]). Functional evaluation: the Barthel Index. [address] [facility] (14)2. AlineKEVIN Vasques, [facility]., [facility], [address] [place] [address] (1999). Measuring the change indisability after inpatient rehabilitation; comparison of the responsiveness of the Barthel Index and Functional [place] Measure. Journal of Neurology, Neurosurgery, and Psychiatry, 66(4), 599-296. Guerda Xavier, N.J.A, DAYDAY Cortes, & Sallie Watkins MROSANGELA. (2004.) Assessment of post-stroke quality of life in cost-effectiveness studies: The usefulness of the Barthel Index and the EuroQoL-5D.  Quality of Life Research, 15, 639-88           Physical Therapy Evaluation Charge Determination   History Examination Presentation Decision-Making   LOW Complexity : Zero comorbidities / personal factors that will impact the outcome / POC LOW Complexity : 1-2 Standardized tests and measures addressing body structure, function, activity limitation and / or participation in recreation  LOW Complexity : Stable, uncomplicated  LOW Complexity : FOTO score of       Based on the above components, the patient evaluation is determined to be of the following complexity level: LOW     Pain Rating:  Pt denied pain. Activity Tolerance:   Fair and requires rest breaks    After treatment patient left in no apparent distress:   Call bell within reach, Bed / chair alarm activated, Caregiver / family present, and in recliner with legs elevated    COMMUNICATION/EDUCATION:   The patients plan of care was discussed with: Occupational therapist, Registered nurse, and Case management. Fall prevention education was provided and the patient/caregiver indicated understanding., Patient/family have participated as able in goal setting and plan of care., Patient/family agree to work toward stated goals and plan of care. , and Patient understands intent and goals of therapy, but is neutral about his/her participation.     Thank you for this referral.  Leopold Fila, PT   Time Calculation: 25 mins

## 2021-04-27 NOTE — PROGRESS NOTES
Hospitalist Progress Note              Yonis Padron MD.                                                             Cell: [phone]                               NAME:  Vivian Martin  :  3/23/1933  MRN:  [id number]  Date of Service:  2021    Summary: 80 y.o. male who presented on 2021 with right flank pain. CT scan performed demonstrated significant bladder distention distention most likely related to chronic bladder outlet obstruction. Imaging findings suggestive of a mild diffuse enterocolitis.           Assessment/Plan:  Bladder outlet obstruction; S/p rodriguez catheter insertion, NO UTI  Likely due to BPH  Per urology   Keep rodriguez in place and will need outpatient evaluation and voiding trial.  Recommend finasteride and tamsulosin qd    Dementia with acute behavioral disturbance:   this was precipitated by bladder obstruction  Supportive care  F/u with . Going to SNF now     Hematuria; Due to #1       Patient medically cleared for discharge        Code status: Full, recommend DNR  DVT prophylaxsis: SCD  Dispo: tbd         Interval History/Subjective:    Patient is comfortable today   He is doing good  Son at bedside     Review of Systems:  A comprehensive review of systems was negative except for that written in the HPI. Objective:     VITALS:   Last 24hrs VS reviewed since prior progress note. Most recent are:  Visit Vitals  BP (P) 130/73 (BP 1 Location: Right upper arm, BP Patient Position: At rest)   Pulse (P) 96   Temp (P) 98.2 °F (36.8 °C)   Resp (P) 16   Wt 67.2 kg (148 lb 2.4 oz)   SpO2 (P) 96%   BMI 21.26 kg/m²       Intake/Output Summary (Last 24 hours) at 2021 1000  Last data filed at 2021 0703  Gross per 24 hour   Intake    Output 1760 ml   Net -1760 ml        PHYSICAL EXAM:  General: No acute distress, cooperative, pleasant   EENT: EOMI. Anicteric sclerae.  Oral mucous moist, oropharynx benign  Resp: CTA bilaterally. No wheezing/rhonchi/rales. No accessory muscle use  CV: Regular rhythm, normal rate, no murmurs, gallops, rubs  GI: Soft, non distended, non tender. normoactive bowel sounds, no hepatosplenomegaly Extremities: No edema, warm, 2+ pulses throughout  Neurologic: Moves all extremities. AAOx3, CN II-XII grossly intact  Psych: Good insight. Not anxious nor agitated. Skin: Good Turgor, no rashes or ulcers    Lab Data Personally Reviewed: (see below)     Medications list Personally Reviewed:  x YES  NO     _______________________________________________________________________  Care Plan discussed with:  Patient/Family and Nurse    Total NON critical care TIME:  27 minutes    Humaz Siddiqi MD     Procedures: see electronic medical records for all procedures/Xrays and details which were not copied into this note but were reviewed prior to creation of Plan. LABS:  Recent Labs     04/24/21  2104   WBC 8.6   HGB 14.1   HCT 41.8        Recent Labs     04/26/21  0001 04/24/21  2104    146*   K 3.8 4.0   * 107   CO2 25 26   BUN 18 29*   CREA 0.76 0.86   * 175*   CA 8.4* 8.7   MG  --  2.1     Recent Labs     04/24/21  2104   ALT 30   AP 99   TBILI 0.7   TP 6.8   ALB 3.8   GLOB 3.0   LPSE 118     No results for input(s): INR, PTP, APTT, INREXT, INREXT in the last 72 hours. No results for input(s): FE, TIBC, PSAT, FERR in the last 72 hours. No results found for: FOL, RBCF   No results for input(s): PH, PCO2, PO2 in the last 72 hours. No results for input(s): CPK, CKNDX, TROIQ in the last 72 hours.     No lab exists for component: CPKMB  No results found for: CHOL, CHOLX, CHLST, CHOLV, HDL, HDLP, LDL, LDLC, DLDLP, TGLX, TRIGL, TRIGP, CHHD, CHHDX  No results found for: [facility]  Lab Results   Component Value Date/Time    Color YELLOW/STRAW 04/24/2021 09:50 PM    Appearance HAZY (A) 04/24/2021 09:50 PM    Specific gravity 1.025 04/24/2021 09:50 PM    pH (UA) 6.0 04/24/2021 09:50 PM Protein Negative 04/24/2021 09:50 PM    Glucose Negative 04/24/2021 09:50 PM    Ketone Negative 04/24/2021 09:50 PM    Bilirubin Negative 04/24/2021 09:50 PM    Urobilinogen 0.2 04/24/2021 09:50 PM    Nitrites Negative 04/24/2021 09:50 PM    Leukocyte Esterase Negative 04/24/2021 09:50 PM    Epithelial cells FEW 04/24/2021 09:50 PM    Bacteria Negative 04/24/2021 09:50 PM    WBC 0-4 04/24/2021 09:50 PM    RBC  04/24/2021 09:50 PM

## 2021-04-28 VITALS
DIASTOLIC BLOOD PRESSURE: 76 MMHG | WEIGHT: 148.15 LBS | RESPIRATION RATE: 16 BRPM | BODY MASS INDEX: 21.26 KG/M2 | HEART RATE: 98 BPM | SYSTOLIC BLOOD PRESSURE: 131 MMHG | OXYGEN SATURATION: 95 % | TEMPERATURE: 98 F

## 2021-04-28 LAB
SARS-COV-2, XPLCVT: NOT DETECTED
SOURCE, COVRS: NORMAL

## 2021-04-28 PROCEDURE — 74011250637 HC RX REV CODE- 250/637: Performed by: HOSPITALIST

## 2021-04-28 PROCEDURE — 74011250637 HC RX REV CODE- 250/637: Performed by: INTERNAL MEDICINE

## 2021-04-28 RX ORDER — TAMSULOSIN HYDROCHLORIDE 0.4 MG/1
0.4 CAPSULE ORAL DAILY
Qty: 30 CAP | Refills: 0 | Status: SHIPPED
Start: 2021-04-29 | End: 2021-04-28 | Stop reason: SDUPTHER

## 2021-04-28 RX ORDER — FINASTERIDE 5 MG/1
5 TABLET, FILM COATED ORAL DAILY
Qty: 30 TAB | Refills: 0 | Status: SHIPPED
Start: 2021-04-29 | End: 2021-04-28 | Stop reason: SDUPTHER

## 2021-04-28 RX ORDER — TAMSULOSIN HYDROCHLORIDE 0.4 MG/1
0.4 CAPSULE ORAL DAILY
Qty: 30 CAP | Refills: 3 | Status: ON HOLD | OUTPATIENT
Start: 2021-04-29 | End: 2021-08-13 | Stop reason: SDUPTHER

## 2021-04-28 RX ORDER — FINASTERIDE 5 MG/1
5 TABLET, FILM COATED ORAL DAILY
Qty: 30 TAB | Refills: 3 | Status: ON HOLD | OUTPATIENT
Start: 2021-04-29 | End: 2021-07-29

## 2021-04-28 RX ADMIN — TAMSULOSIN HYDROCHLORIDE 0.4 MG: 0.4 CAPSULE ORAL at 08:34

## 2021-04-28 RX ADMIN — LEVOTHYROXINE SODIUM 50 MCG: 0.05 TABLET ORAL at 08:34

## 2021-04-28 RX ADMIN — POLYETHYLENE GLYCOL 3350 17 G: 17 POWDER, FOR SOLUTION ORAL at 08:35

## 2021-04-28 RX ADMIN — SERTRALINE 50 MG: 50 TABLET, FILM COATED ORAL at 08:34

## 2021-04-28 RX ADMIN — FINASTERIDE 5 MG: 5 TABLET, FILM COATED ORAL at 08:35

## 2021-04-28 RX ADMIN — THERA TABS 1 TABLET: TAB at 08:34

## 2021-04-28 NOTE — PROGRESS NOTES
JERO: Plan for return to [facility] with 24/7 private duty care which is being arranged by family. [employer] has delivered the RW to the hospital this morning. The patient will be discharged with a rodriguez. Covid test negative 4/27. Son will transport patient via car. Chart reviewed. CM to confirm with facility whether they will be arranging therapy. CM called Rosalie Chavez at [address] [address] [address]. They will need orders for PT/OT, covid test results, and DC papers faxed to [phone]. Noted COVID results are pending. 1:07 PM: CM called the lab, covid results should be back by 2 PM.    3:05 PM: CM called the lab again, results are still pending. CM noted results are negative. [facility] transport is arranged for 5:15 PM. MD aware. CM met with patient and son, the son would prefer to transport patient via car. Rn aware. Medicare pt has received, reviewed, and signed 2nd IM letter informing them of their right to appeal the discharge. Signed copy has been placed on pt bedside chart.     CM faxed discharge papers, covid results and [facility] orders to [phone]    ROSE Esquivel/TIMBO

## 2021-04-28 NOTE — DISCHARGE INSTRUCTIONS
Discharge Instructions       PATIENT ID: Nitish Burdick  MRN: [id number]   YOB: 1933    DATE OF ADMISSION: 4/24/2021  8:48 PM    DATE OF DISCHARGE: 4/28/2021    PRIMARY CARE PROVIDER: Anna Pichardo MD     ATTENDING PHYSICIAN: Perico Evans MD  DISCHARGING PROVIDER: Suyapa Elkins MD    To contact this individual call [phone] and ask the  to page. If unavailable ask to be transferred the Adult Hospitalist Department. DISCHARGE DIAGNOSES   URINARY RETENTION     CONSULTATIONS: IP CONSULT TO HOSPITALIST  IP CONSULT TO UROLOGY    PROCEDURES/SURGERIES: * No surgery found *    PENDING TEST RESULTS:   At the time of discharge the following test results are still pending:     FOLLOW UP APPOINTMENTS:   Follow-up Information     Follow up With Specialties Details Why [address] [facility] Urology   follow up on 5/4/21 at 10 AM with Dr. Shad Curiel and Voiding Trial at the [facility] office [phone] [address] [place] [address] Dr Coburn [address] [place] [address]:     Please follow up with PCP and urology for the voiding trial   Take medications as advised by the urologist as listed below for prostate enlargement. Your appointment has been made as above with urology     DIET: Regular Diet  Oral Nutritional Supplements:    ACTIVITY: Activity as tolerated    WOUND CARE:     EQUIPMENT needed:       Radiology      DISCHARGE MEDICATIONS:   See Medication Reconciliation Form    · It is important that you take the medication exactly as they are prescribed. · Keep your medication in the bottles provided by the pharmacist and keep a list of the medication names, dosages, and times to be taken in your wallet. · Do not take other medications without consulting your doctor. NOTIFY YOUR PHYSICIAN FOR ANY OF THE FOLLOWING:   Fever over 101 degrees for 24 hours.    Chest pain, shortness of breath, fever, chills, nausea, vomiting, diarrhea, change in mentation, falling, weakness, bleeding. Severe pain or pain not relieved by medications. Or, any other signs or symptoms that you may have questions about. DISPOSITION:   xx Home With:   OT  PT  HH  RN       SNF/Inpatient Rehab/LTAC    Independent/assisted living    Hospice    Other:     CDMP Checked:   Yes x     PROBLEM LIST Updated:  Yes x       Signed:   Aniket Irby MD  4/28/2021  3:09 PM       My Medications      START taking these medications      Instructions Each Dose to Equal Morning Noon Evening Bedtime   finasteride 5 mg tablet  Commonly known as: PROSCAR  Start taking on: April 29, 2021    Your last dose was: Your next dose is: Take 1 Tab by mouth daily. 5 mg                 tamsulosin 0.4 mg capsule  Commonly known as: FLOMAX  Start taking on: April 29, 2021    Your last dose was: Your next dose is: Take 1 Cap by mouth daily. 0.4 mg                    CONTINUE taking these medications      Instructions Each Dose to Equal Morning Noon Evening Bedtime   Fish Oil 1,000 mg Cap  Generic drug: omega-3 fatty acids-vitamin e    Your last dose was: Your next dose is: Take 1 Cap by mouth daily. 1 Cap                 levothyroxine 25 mcg tablet  Commonly known as: SYNTHROID    Your last dose was: Your next dose is: Take 50 mcg by mouth Daily (before breakfast). 50 mcg                 multivitamin tablet  Commonly known as: ONE A DAY    Your last dose was: Your next dose is: Take 1 Tab by mouth daily. 1 Tab                 Zoloft 50 mg tablet  Generic drug: sertraline    Your last dose was: Your next dose is: Take 50 mg by mouth daily.    50 mg                       Where to Get Your Medications      These medications were sent to [address] [place] [address]    Phone: [phone]   · finasteride 5 mg tablet  · tamsulosin 0.4 mg capsule

## 2021-04-28 NOTE — DISCHARGE SUMMARY
Discharge Summary       PATIENT ID: Jessica Bronson  MRN: [id number]   YOB: 1933    DATE OF ADMISSION: 4/24/2021  8:48 PM    DATE OF DISCHARGE: 4/28/2021   PRIMARY CARE PROVIDER: Anna Pichardo MD     ATTENDING PHYSICIAN: Leticia Hawthorne  DISCHARGING PROVIDER: Bernadette Durham MD    To contact this individual call [phone] and ask the  to page. If unavailable ask to be transferred the Adult Hospitalist Department. CONSULTATIONS: IP CONSULT TO HOSPITALIST  IP CONSULT TO UROLOGY    PROCEDURES/SURGERIES: * No surgery found *    ADMITTING DIAGNOSES & HOSPITAL COURSE:   80 y.o. male who presented on 4/24/2021 with right flank pain.     CT scan performed demonstrated significant bladder distention distention most likely related to chronic bladder outlet obstruction. Imaging findings suggestive of a mild diffuse enterocolitis. Ct Abd Pelv W Cont    Result Date: 4/24/2021  CLINICAL HISTORY: right flank and abd pain INDICATION: right flank and abd pain COMPARISON: None. CONTRAST: 100  ml Isovue 370 TECHNIQUE: Multislice helical CT was performed of the abdomen and pelvis following uneventful rapid bolus intravenous contrast administration. Oral contrast was not administered. Contiguous 5 mm axial images were reconstructed and lung and soft tissue windows were generated. Coronal and sagittal reformations were generated. CT dose reduction was achieved through use of a standardized protocol tailored for this examination and automatic exposure control for dose modulation. FINDINGS: LUNG BASES:Cardiomegaly. Small hiatal hernia. LIVER: Hepatic steatosis There is no intrahepatic duct dilatation. There is no hepatic parenchymal mass. Hepatic enhancement pattern is within normal limits. Portal vein is patent. GALLBLADDER:  No dilatation or wall thickening. SPLEEN/PANCREAS: No mass. There is no pancreatic duct dilatation. There is no evidence of splenomegaly.  ADRENALS/KIDNEYS: Left renal cyst. Distended bladder with mild hydroureter on the right and on the left. There is no hydronephrosis. There is no renal mass. There is no perinephric mass. STOMACH: No dilatation or wall thickening. COLON AND SMALL BOWEL: Fecal stasis. Hyperemic mucosa. There is no free intraperitoneal air. There is no evidence of incarceration or obstruction. No mesenteric adenopathy. PERITONEUM: Unremarkable. APPENDIX: Unremarkable. BLADDER/REPRODUCTIVE ORGANS: Distended urinary bladder. RETROPERITONEUM: Unremarkable. The abdominal aorta is normal in caliber. No aneurysm. No retroperitoneal adenopathy. OSSEOUS STRUCTURES: Stabilization hardware. Significant bladder distention distention most likely related to chronic bladder outlet obstruction. Imaging findings suggestive of a mild diffuse enterocolitis. Consider infectious and inflammatory etiologies.       HOSPITAL COURSE       Bladder outlet obstruction; S/p rodriguez catheter insertion, NO UTI  Likely due to BPH  Per urology   Keep rodriguez in place and will need outpatient evaluation and voiding trial.  Recommend finasteride and tamsulosin qd     Dementia with acute behavioral disturbance:   this was precipitated by bladder obstruction  Supportive care       Hematuria; Due to #1   [address] / PLAN:      Urology appointment outpatient     ADDITIONAL CARE RECOMMENDATIONS:   PLEASE FOLLOW UP WITH PCP AND UROLOGY      PENDING TEST RESULTS:   At the time of discharge the following test results are still pending:     FOLLOW UP APPOINTMENTS:    Follow-up Information     Follow up With Specialties Details Why [facility]   follow up on 5/4/21 at 10 AM with Dr. Eldon Ha and Voiding Trial at the [facility] office [phone] [address] [place] [address] Dr Coburn [id number]             DIET: Regular Diet  Oral Nutritional Supplements:     ACTIVITY: Activity as tolerated    WOUND CARE:     EQUIPMENT needed:       DISCHARGE MEDICATIONS:  Current Discharge Medication List      START taking these medications    Details   finasteride (PROSCAR) 5 mg tablet Take 1 Tab by mouth daily. Qty: 30 Tab, Refills: 0      tamsulosin (FLOMAX) 0.4 mg capsule Take 1 Cap by mouth daily. Qty: 30 Cap, Refills: 0         CONTINUE these medications which have NOT CHANGED    Details   levothyroxine (SYNTHROID) 25 mcg tablet Take 50 mcg by mouth Daily (before breakfast). sertraline (Zoloft) 50 mg tablet Take 50 mg by mouth daily. multivitamin (ONE A DAY) tablet Take 1 Tab by mouth daily. omega-3 fatty acids-vitamin e (FISH OIL) 1,000 mg cap Take 1 Cap by mouth daily. NOTIFY YOUR PHYSICIAN FOR ANY OF THE FOLLOWING:   Fever over 101 degrees for 24 hours. Chest pain, shortness of breath, fever, chills, nausea, vomiting, diarrhea, change in mentation, falling, weakness, bleeding. Severe pain or pain not relieved by medications. Or, any other signs or symptoms that you may have questions about. DISPOSITION:  X  Home With:   OT  PT  HH  RN       Long term SNF/Inpatient Rehab    Independent/assisted living    Hospice    Other:       PATIENT CONDITION AT DISCHARGE:     Functional status    Poor     Deconditioned    X Independent      Cognition     Lucid     Forgetful     x Dementia      Catheters/lines (plus indication)     Orta     PICC     PEG     None      Code status    X Full code     DNR      PHYSICAL EXAMINATION AT DISCHARGE:  I had a face to face encounter with this patient and independently examined them on 04/28/21 as outlined below:  General:          Alert, cooperative, no distress, appears stated age. HEENT:           Atraumatic, anicteric sclerae, pink conjunctivae                          No oral ulcers, mucosa moist, throat clear, dentition fair  Neck:               Supple, symmetrical  Lungs:             Clear to auscultation bilaterally. No Wheezing or Rhonchi. No rales.   Chest wall:      No tenderness  No Accessory muscle use.  Heart:              Regular  rhythm,  No  murmur   No edema  Abdomen:        Soft, non-tender. Not distended. Bowel sounds normal  Extremities:     No cyanosis. No clubbing,                            Skin turgor normal, Capillary refill normal  Skin:                Not pale. Not Jaundiced  No rashes   Psych:             Not anxious or agitated. 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 Quite often unanticipated grammatical, syntax, homophones, and other interpretive errors are inadvertently transcribed by the computer software. Please disregard these errors. Please excuse any errors that have escaped final proofreading.

## 2021-04-28 NOTE — PROGRESS NOTES
Bedside shift change report given to Klever RN (oncoming nurse) by Steven Kimball RN (offgoing nurse). Report included the following information SBAR, Kardex, Intake/Output, MAR and Recent Results.

## 2021-04-28 NOTE — PROGRESS NOTES
Physical Therapy  4/28/2021    Chart reviewed. Attempted to see pt, however pt is sound asleep. Son is at bedside and suggested to let pt rest. He also reported that pt may d/c to long term sometime today. Will follow up at later time as able and appropriate.      Sophie Means, PTA

## 2021-04-28 NOTE — PROGRESS NOTES
TRANSFER - OUT REPORT:    Verbal report given to Librado Chad (name) on Vivian Veronica  being transferred to [facility] for routine progression of care       Report consisted of patients Situation, Background, Assessment and   Recommendations(SBAR). Information from the following report(s) SBAR, MAR and Med Rec Status was reviewed with the receiving nurse. Lines:   Peripheral IV 04/26/21 Left;Upper (Active)   Site Assessment Clean, dry, & intact 04/28/21 0737   Phlebitis Assessment 0 04/28/21 0737   Infiltration Assessment 0 04/28/21 0737   Dressing Status Clean, dry, & intact 04/28/21 0737   Dressing Type Transparent 04/28/21 0737   Hub Color/Line Status Pink;Capped 04/28/21 0737   Action Taken Open ports on tubing capped 04/27/21 1634   Alcohol Cap Used Yes 04/27/21 1634        Opportunity for questions and clarification was provided.       Patient transported with:

## 2021-06-17 ENCOUNTER — HOSPITAL ENCOUNTER (INPATIENT)
Age: 86
LOS: 1 days | Discharge: HOME | DRG: 699 | End: 2021-06-19
Attending: EMERGENCY MEDICINE | Admitting: INTERNAL MEDICINE
Payer: MEDICARE

## 2021-06-17 ENCOUNTER — APPOINTMENT (OUTPATIENT)
Dept: GENERAL RADIOLOGY | Age: 86
DRG: 699 | End: 2021-06-17
Attending: EMERGENCY MEDICINE
Payer: MEDICARE

## 2021-06-17 DIAGNOSIS — T83.511A URINARY TRACT INFECTION ASSOCIATED WITH INDWELLING URETHRAL CATHETER, INITIAL ENCOUNTER (HCC): Primary | ICD-10-CM

## 2021-06-17 DIAGNOSIS — N39.0 URINARY TRACT INFECTION ASSOCIATED WITH INDWELLING URETHRAL CATHETER, INITIAL ENCOUNTER (HCC): Primary | ICD-10-CM

## 2021-06-17 LAB
ALBUMIN SERPL-MCNC: 3.2 G/DL (ref 3.5–5)
ALBUMIN/GLOB SERPL: 1.1 {RATIO} (ref 1.1–2.2)
ALP SERPL-CCNC: 86 U/L (ref 45–117)
ALT SERPL-CCNC: 20 U/L (ref 12–78)
ANION GAP SERPL CALC-SCNC: 6 MMOL/L (ref 5–15)
APPEARANCE UR: ABNORMAL
AST SERPL-CCNC: 18 U/L (ref 15–37)
BACTERIA URNS QL MICRO: ABNORMAL /HPF
BASOPHILS # BLD: 0 K/UL (ref 0–0.1)
BASOPHILS NFR BLD: 0 % (ref 0–1)
BILIRUB SERPL-MCNC: 0.7 MG/DL (ref 0.2–1)
BILIRUB UR QL: NEGATIVE
BUN SERPL-MCNC: 33 MG/DL (ref 6–20)
BUN/CREAT SERPL: 32 (ref 12–20)
CALCIUM SERPL-MCNC: 8.5 MG/DL (ref 8.5–10.1)
CHLORIDE SERPL-SCNC: 108 MMOL/L (ref 97–108)
CO2 SERPL-SCNC: 25 MMOL/L (ref 21–32)
COLOR UR: ABNORMAL
COMMENT, HOLDF: NORMAL
CREAT SERPL-MCNC: 1.04 MG/DL (ref 0.7–1.3)
DIFFERENTIAL METHOD BLD: ABNORMAL
EOSINOPHIL # BLD: 0.4 K/UL (ref 0–0.4)
EOSINOPHIL NFR BLD: 6 % (ref 0–7)
EPITH CASTS URNS QL MICRO: ABNORMAL /LPF
ERYTHROCYTE [DISTWIDTH] IN BLOOD BY AUTOMATED COUNT: 13.2 % (ref 11.5–14.5)
GLOBULIN SER CALC-MCNC: 2.9 G/DL (ref 2–4)
GLUCOSE SERPL-MCNC: 122 MG/DL (ref 65–100)
GLUCOSE UR STRIP.AUTO-MCNC: NEGATIVE MG/DL
HCT VFR BLD AUTO: 36.1 % (ref 36.6–50.3)
HGB BLD-MCNC: 11.9 G/DL (ref 12.1–17)
HGB UR QL STRIP: ABNORMAL
IMM GRANULOCYTES # BLD AUTO: 0 K/UL
IMM GRANULOCYTES NFR BLD AUTO: 0 %
KETONES UR QL STRIP.AUTO: ABNORMAL MG/DL
LACTATE SERPL-SCNC: 1.1 MMOL/L (ref 0.4–2)
LEUKOCYTE ESTERASE UR QL STRIP.AUTO: ABNORMAL
LYMPHOCYTES # BLD: 0.6 K/UL (ref 0.8–3.5)
LYMPHOCYTES NFR BLD: 8 % (ref 12–49)
MCH RBC QN AUTO: 30.1 PG (ref 26–34)
MCHC RBC AUTO-ENTMCNC: 33 G/DL (ref 30–36.5)
MCV RBC AUTO: 91.4 FL (ref 80–99)
MONOCYTES # BLD: 0.3 K/UL (ref 0–1)
MONOCYTES NFR BLD: 4 % (ref 5–13)
MUCOUS THREADS URNS QL MICRO: ABNORMAL /LPF
NEUTS BAND NFR BLD MANUAL: 10 % (ref 0–6)
NEUTS SEG # BLD: 6.1 K/UL (ref 1.8–8)
NEUTS SEG NFR BLD: 72 % (ref 32–75)
NITRITE UR QL STRIP.AUTO: NEGATIVE
NRBC # BLD: 0 K/UL (ref 0–0.01)
NRBC BLD-RTO: 0 PER 100 WBC
PH UR STRIP: 5.5 [PH] (ref 5–8)
PLATELET # BLD AUTO: 184 K/UL (ref 150–400)
PMV BLD AUTO: 9.7 FL (ref 8.9–12.9)
POTASSIUM SERPL-SCNC: 3.9 MMOL/L (ref 3.5–5.1)
PROT SERPL-MCNC: 6.1 G/DL (ref 6.4–8.2)
PROT UR STRIP-MCNC: 30 MG/DL
RBC # BLD AUTO: 3.95 M/UL (ref 4.1–5.7)
RBC #/AREA URNS HPF: ABNORMAL /HPF (ref 0–5)
RBC MORPH BLD: ABNORMAL
SAMPLES BEING HELD,HOLD: NORMAL
SODIUM SERPL-SCNC: 139 MMOL/L (ref 136–145)
SP GR UR REFRACTOMETRY: 1.03
UR CULT HOLD, URHOLD: NORMAL
UROBILINOGEN UR QL STRIP.AUTO: 1 EU/DL (ref 0.2–1)
WBC # BLD AUTO: 7.4 K/UL (ref 4.1–11.1)
WBC URNS QL MICRO: ABNORMAL /HPF (ref 0–4)

## 2021-06-17 PROCEDURE — 93005 ELECTROCARDIOGRAM TRACING: CPT

## 2021-06-17 PROCEDURE — 81001 URINALYSIS AUTO W/SCOPE: CPT

## 2021-06-17 PROCEDURE — 85025 COMPLETE CBC W/AUTO DIFF WBC: CPT

## 2021-06-17 PROCEDURE — 36415 COLL VENOUS BLD VENIPUNCTURE: CPT

## 2021-06-17 PROCEDURE — 74011000258 HC RX REV CODE- 258: Performed by: EMERGENCY MEDICINE

## 2021-06-17 PROCEDURE — 80053 COMPREHEN METABOLIC PANEL: CPT

## 2021-06-17 PROCEDURE — 96365 THER/PROPH/DIAG IV INF INIT: CPT

## 2021-06-17 PROCEDURE — 71045 X-RAY EXAM CHEST 1 VIEW: CPT

## 2021-06-17 PROCEDURE — 87040 BLOOD CULTURE FOR BACTERIA: CPT

## 2021-06-17 PROCEDURE — 74011250636 HC RX REV CODE- 250/636: Performed by: EMERGENCY MEDICINE

## 2021-06-17 PROCEDURE — 83605 ASSAY OF LACTIC ACID: CPT

## 2021-06-17 PROCEDURE — 87086 URINE CULTURE/COLONY COUNT: CPT

## 2021-06-17 PROCEDURE — 99285 EMERGENCY DEPT VISIT HI MDM: CPT

## 2021-06-17 RX ADMIN — SODIUM CHLORIDE 1000 ML: 9 INJECTION, SOLUTION INTRAVENOUS at 23:18

## 2021-06-17 RX ADMIN — CEFEPIME HYDROCHLORIDE 1 G: 1 INJECTION, POWDER, FOR SOLUTION INTRAMUSCULAR; INTRAVENOUS at 23:20

## 2021-06-18 ENCOUNTER — APPOINTMENT (OUTPATIENT)
Dept: CT IMAGING | Age: 86
DRG: 699 | End: 2021-06-18
Attending: EMERGENCY MEDICINE
Payer: MEDICARE

## 2021-06-18 PROBLEM — T83.511A URINARY TRACT INFECTION ASSOCIATED WITH INDWELLING URETHRAL CATHETER (HCC): Status: ACTIVE | Noted: 2021-06-18

## 2021-06-18 PROBLEM — N39.0 URINARY TRACT INFECTION ASSOCIATED WITH INDWELLING URETHRAL CATHETER (HCC): Status: ACTIVE | Noted: 2021-06-18

## 2021-06-18 LAB
ATRIAL RATE: 96 BPM
BACTERIA SPEC CULT: NORMAL
CALCULATED P AXIS, ECG09: 55 DEGREES
CALCULATED R AXIS, ECG10: 22 DEGREES
CALCULATED T AXIS, ECG11: 43 DEGREES
COVID-19 RAPID TEST, COVR: NOT DETECTED
DIAGNOSIS, 93000: NORMAL
P-R INTERVAL, ECG05: 172 MS
Q-T INTERVAL, ECG07: 350 MS
QRS DURATION, ECG06: 80 MS
QTC CALCULATION (BEZET), ECG08: 442 MS
SERVICE CMNT-IMP: NORMAL
SOURCE, COVRS: NORMAL
VENTRICULAR RATE, ECG03: 96 BPM

## 2021-06-18 PROCEDURE — 74011250637 HC RX REV CODE- 250/637: Performed by: INTERNAL MEDICINE

## 2021-06-18 PROCEDURE — 74176 CT ABD & PELVIS W/O CONTRAST: CPT

## 2021-06-18 PROCEDURE — 97116 GAIT TRAINING THERAPY: CPT

## 2021-06-18 PROCEDURE — 65270000029 HC RM PRIVATE

## 2021-06-18 PROCEDURE — 87635 SARS-COV-2 COVID-19 AMP PRB: CPT

## 2021-06-18 PROCEDURE — 74011250636 HC RX REV CODE- 250/636: Performed by: INTERNAL MEDICINE

## 2021-06-18 PROCEDURE — 97161 PT EVAL LOW COMPLEX 20 MIN: CPT

## 2021-06-18 PROCEDURE — 74011250636 HC RX REV CODE- 250/636: Performed by: HOSPITALIST

## 2021-06-18 PROCEDURE — 97165 OT EVAL LOW COMPLEX 30 MIN: CPT

## 2021-06-18 PROCEDURE — 74011000258 HC RX REV CODE- 258: Performed by: HOSPITALIST

## 2021-06-18 RX ORDER — GALANTAMINE HYDROBROMIDE 16 MG/1
16 CAPSULE, EXTENDED RELEASE ORAL
Status: ON HOLD | COMMUNITY
End: 2021-07-29

## 2021-06-18 RX ORDER — UREA 10 %
100 LOTION (ML) TOPICAL DAILY
Status: DISCONTINUED | OUTPATIENT
Start: 2021-06-19 | End: 2021-06-19 | Stop reason: HOSPADM

## 2021-06-18 RX ORDER — ONDANSETRON 4 MG/1
4 TABLET, ORALLY DISINTEGRATING ORAL
Status: DISCONTINUED | OUTPATIENT
Start: 2021-06-18 | End: 2021-06-19 | Stop reason: HOSPADM

## 2021-06-18 RX ORDER — GALANTAMINE HYDROBROMIDE 16 MG/1
16 CAPSULE, EXTENDED RELEASE ORAL
Status: DISCONTINUED | OUTPATIENT
Start: 2021-06-19 | End: 2021-06-19 | Stop reason: HOSPADM

## 2021-06-18 RX ORDER — ENOXAPARIN SODIUM 100 MG/ML
40 INJECTION SUBCUTANEOUS DAILY
Status: DISCONTINUED | OUTPATIENT
Start: 2021-06-18 | End: 2021-06-19 | Stop reason: HOSPADM

## 2021-06-18 RX ORDER — ACETAMINOPHEN 325 MG/1
650 TABLET ORAL
Status: DISCONTINUED | OUTPATIENT
Start: 2021-06-18 | End: 2021-06-19 | Stop reason: HOSPADM

## 2021-06-18 RX ORDER — SODIUM CHLORIDE 0.9 % (FLUSH) 0.9 %
5-40 SYRINGE (ML) INJECTION AS NEEDED
Status: DISCONTINUED | OUTPATIENT
Start: 2021-06-18 | End: 2021-06-19 | Stop reason: HOSPADM

## 2021-06-18 RX ORDER — MELATONIN
5000 DAILY
Status: DISCONTINUED | OUTPATIENT
Start: 2021-06-19 | End: 2021-06-19 | Stop reason: HOSPADM

## 2021-06-18 RX ORDER — MELATONIN
1000 DAILY
Status: DISCONTINUED | OUTPATIENT
Start: 2021-06-19 | End: 2021-06-18

## 2021-06-18 RX ORDER — ONDANSETRON 2 MG/ML
4 INJECTION INTRAMUSCULAR; INTRAVENOUS
Status: DISCONTINUED | OUTPATIENT
Start: 2021-06-18 | End: 2021-06-19 | Stop reason: HOSPADM

## 2021-06-18 RX ORDER — THERA TABS 400 MCG
1 TAB ORAL DAILY
Status: DISCONTINUED | OUTPATIENT
Start: 2021-06-18 | End: 2021-06-19 | Stop reason: HOSPADM

## 2021-06-18 RX ORDER — SODIUM CHLORIDE 0.9 % (FLUSH) 0.9 %
5-40 SYRINGE (ML) INJECTION EVERY 8 HOURS
Status: DISCONTINUED | OUTPATIENT
Start: 2021-06-18 | End: 2021-06-19 | Stop reason: HOSPADM

## 2021-06-18 RX ORDER — SERTRALINE HYDROCHLORIDE 50 MG/1
50 TABLET, FILM COATED ORAL DAILY
Status: DISCONTINUED | OUTPATIENT
Start: 2021-06-18 | End: 2021-06-19 | Stop reason: HOSPADM

## 2021-06-18 RX ORDER — OMEGA-3-ACID ETHYL ESTERS 1 G/1
1 CAPSULE, LIQUID FILLED ORAL DAILY
Status: DISCONTINUED | OUTPATIENT
Start: 2021-06-18 | End: 2021-06-19 | Stop reason: HOSPADM

## 2021-06-18 RX ORDER — FINASTERIDE 5 MG/1
5 TABLET, FILM COATED ORAL DAILY
Status: DISCONTINUED | OUTPATIENT
Start: 2021-06-18 | End: 2021-06-19 | Stop reason: HOSPADM

## 2021-06-18 RX ORDER — TAMSULOSIN HYDROCHLORIDE 0.4 MG/1
0.4 CAPSULE ORAL DAILY
Status: DISCONTINUED | OUTPATIENT
Start: 2021-06-18 | End: 2021-06-19 | Stop reason: HOSPADM

## 2021-06-18 RX ORDER — LEVOTHYROXINE SODIUM 75 UG/1
75 TABLET ORAL
Status: DISCONTINUED | OUTPATIENT
Start: 2021-06-19 | End: 2021-06-19 | Stop reason: HOSPADM

## 2021-06-18 RX ORDER — POLYETHYLENE GLYCOL 3350 17 G/17G
17 POWDER, FOR SOLUTION ORAL DAILY PRN
Status: DISCONTINUED | OUTPATIENT
Start: 2021-06-18 | End: 2021-06-19 | Stop reason: HOSPADM

## 2021-06-18 RX ORDER — ACETAMINOPHEN 650 MG/1
650 SUPPOSITORY RECTAL
Status: DISCONTINUED | OUTPATIENT
Start: 2021-06-18 | End: 2021-06-19 | Stop reason: HOSPADM

## 2021-06-18 RX ADMIN — Medication 10 ML: at 17:11

## 2021-06-18 RX ADMIN — FINASTERIDE 5 MG: 5 TABLET, FILM COATED ORAL at 14:29

## 2021-06-18 RX ADMIN — ENOXAPARIN SODIUM 40 MG: 40 INJECTION SUBCUTANEOUS at 10:13

## 2021-06-18 RX ADMIN — SERTRALINE 50 MG: 50 TABLET, FILM COATED ORAL at 14:29

## 2021-06-18 RX ADMIN — TAMSULOSIN HYDROCHLORIDE 0.4 MG: 0.4 CAPSULE ORAL at 14:40

## 2021-06-18 RX ADMIN — THERA TABS 1 TABLET: TAB at 14:29

## 2021-06-18 RX ADMIN — CEFTRIAXONE SODIUM 1 G: 1 INJECTION, POWDER, FOR SOLUTION INTRAMUSCULAR; INTRAVENOUS at 10:13

## 2021-06-18 NOTE — DISCHARGE INSTRUCTIONS
Discharge Instructions       PATIENT ID: Lillian España  MRN: [id number]   YOB: 1933    DATE OF ADMISSION: 6/17/2021 10:40 PM    DATE OF DISCHARGE: 6/19/2021    PRIMARY CARE PROVIDER: Aviva Bray MD     ATTENDING PHYSICIAN: Mabel Mcdaniel MD  DISCHARGING PROVIDER: Nghia Burton MD    To contact this individual call [phone] and ask the  to page. If unavailable ask to be transferred the Adult Hospitalist Department. DISCHARGE DIAGNOSES   UTI indwelling rodriguez catheter associated POA  Bladder outlet obstruction.    BPH  Hypothyroidism.    Dementia     CONSULTATIONS: IP CONSULT TO UROLOGY    PROCEDURES/SURGERIES: * No surgery found *    PENDING TEST RESULTS:   At the time of discharge the following test results are still pending: None    FOLLOW UP APPOINTMENTS:   Follow-up Information     Follow up With Specialties Details Why [facility]   Follow-up on 7/8/21 at 4 PM with Dr. Artie Colorado at the [facility] for second opinion. Please call with questions [phone] [address] [place] [address] Dr Selby [address]     Primary Care Physician    In one week Patient can only remember the practice name and not the physician          ADDITIONAL CARE RECOMMENDATIONS:      DIET: Regular Diet       ACTIVITY: Activity as tolerated    WOUND CARE: None    EQUIPMENT needed:  None      DISCHARGE MEDICATIONS:   See Medication Reconciliation Form    · It is important that you take the medication exactly as they are prescribed. · Keep your medication in the bottles provided by the pharmacist and keep a list of the medication names, dosages, and times to be taken in your wallet. · Do not take other medications without consulting your doctor. NOTIFY YOUR PHYSICIAN FOR ANY OF THE FOLLOWING:   Fever over 101 degrees for 24 hours. Chest pain, shortness of breath, fever, chills, nausea, vomiting, diarrhea, change in mentation, falling, weakness, bleeding.  Severe pain or pain not relieved by medications. Or, any other signs or symptoms that you may have questions about.       DISPOSITION:  x  Home With:  x OT x PT x HH  RN       SNF/Inpatient Rehab/LTAC    Independent/assisted living    Hospice    Other:     CDMP Checked:   Yes x     PROBLEM LIST Updated:  Yes x       Signed:   Trevin Kent MD  6/19/2021  1:49 PM

## 2021-06-18 NOTE — ROUTINE PROCESS
TRANSFER - OUT REPORT: 
 
Verbal report given to Tremayne Grimm 23 (name) [facility] to [facility] (unit) for routine progression of care Report consisted of patients Situation, Background, Assessment and  
Recommendations(SBAR). Information from the following report(s) SBAR, ED Summary and MAR was reviewed with the receiving nurse. Lines:  
Peripheral IV 06/17/21 Anterior;Left;Proximal Forearm (Active) Opportunity for questions and clarification was provided. Patient transported with: 
 [employer]

## 2021-06-18 NOTE — CONSULTS
Requesting Provider: Ruth Alexander MD - Reason for Consultation: Carmen Gomez retention\"  Pre-existing [facility] Patient:   yes                Patient: Flavia Waldron MRN: [id number]  SSN: [id number]    YOB: 1933  Age: 80 y.o. Sex: male     Location: [facility]       Code Status: Full Code   PCP: Other, MD Anna  - None   Emergency Contact:  Primary Emergency Contact: Margaret Isrraeler, Home Phone: [phone]   Race/Spiritism/Language: Marcelino Cons / Ivy Laws / Bettie Schmitt   Payor: Payor: [employer] / Plan: Narciso Bourdon / Product Type: Medicare /    Prior Admission Data: 4/28/21 [address] [place] [address], [address] [place] [address]   Hospitalized:  Hospital Day: 2 - Admitted 6/17/2021 10:40 PM     CONSULTANTS  IP CONSULT TO [address] [place] [address]    ICD-10-CM ICD-9-CM   1. Urinary tract infection associated with indwelling urethral catheter, initial encounter (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 He was previously scheduled for a bipolar TURP on 7/6/21 for definitive management; however, he has since canceled his case requesting a second opinion. Will arrange new appointment after discharge. - UCx with no significant growth. Okay to complete current course of abx due to fevers/lethargy.   - No further urologic interventions at this time. Thank you for this consultation. Please call for further questions. Supervising MD, Dr. Ondina Wright. CC: Fever   HPI: He is a 80 y.o. male with PMH of Arthritis, Asthma, BPH, Dementia ([facility] Hypothyroidism, and Spinal stenosis of lumbar region with radiculopathy. Seen in consultation at the request of the Hospitalist for urinary retention. Pt is a poor historian with dementia, history obtained primarily from his spouse and chart review.      He is followed by Dr. William Light at [facility] for BPH with obstruction on [facility] management. Last seen 2 days ago on 6/16/21, see last OV note below. CT images from the hospital in April showed about a 40-50 g prostate and severe bladder distention of 1 to 2 L and mild bilateral hydronephrosis. A rodriguez was placed. He failed his voiding trial in the office on 6/16. He was prescribed a day of prophylactic Bactrim which he completed. He was scheduled for a bipolar TURP on 7/6/21 for definitive management; however, his spouse reports that they have since canceled his case and would like a second opinion. After he was seen in our office on 6/16, he was taken to the ED at [address] [place] [address] that evening where he had a rodriguez re-placed for >1 L of urine in his bladder. He was discharged home. Back to the ED here at [facility] on 6/17/21 from his assisted living facility for cc of fever to 102.4 F and lethargy. He was admitted for a UTI. Tmax 99.4, now 98 F. HR and BP stable. WBC wnl. Hgb 11.9. Cr 1.04. Lactic 1.1. UA positive for moderate blood, large leuks, 20-50 WBCs, 0-5 RBCs, 2+ bacteria, few epithelial cells. UCx no growth. +Rocephin. +Proscar, Flomax. CT A/P on 6/18 negative for stones or hydro. A rodriguez is in place and the bladder is decompressed with circumferential bladder wall thickening. Images personally reviewed. Problem: retention; Location: bladder; Quality: 1 L , Severity: moderate-severe; Timing: chronic, Context: BPH with obstruction; Better/Worse: improved with rodriguez, Associated s/s:none      ======Last OV Note====  Darrel Gutierrez is an 80year old male who presents today for \"Bladder Problem\". He returns for follow-up. Patient with history of retention and passed voiding trial initially but then came back with a bladder scan with over a liter. Back on [facility] management as there may been some lapse in therapy and is here today for void trial with his family. Did have had some incontinence around the catheter versus leakage from the stopcock on the bag.  Reviewed CT images from the hospital in April showed about a 40-50 g prostate and severe bladder distention of 1 to 2 L and mild bilateral hydronephrosis. He does have dementia    PAST MEDICAL HISTORY:    Allergies: No known allergies. DENIES: Latex, Shellfish, X-Ray Dye, Iodine. Medications: Bactrim -160 mg tablet (sulfamethoxazole-trimethoprim) Take 1 tablet by mouth twice a day   finasteride 5 mg tablet (finasteride) Take 1 tablet by mouth once a day   Flomax 0.4 mg capsule (tamsulosin) Take 1 capsule by mouth every night as directed at bedtime  sertraline 50 mg tablet (sertraline) Take 1 tablet by mouth once a day   levothyroxine 50 mcg tablet (levothyroxine) Take 1 tablet by mouth once a day   melatonin 10 mg capsule (melatonin) as directed   memantine 5 mg tablet (memantine) Take 1 tablet by mouth once a day   donepezil 5 mg tablet (donepezil) Take 1 tablet by mouth every night     Problems: BPH with obstruction (ICD-600.0) (TIA30-N18.1)    Illnesses: High Blood Pressure and Kidney Problems. DENIES: Heart Disease, Pacemaker/Defibrillator, Lung Disease, Diabetes, Bowel Problems, Stroke/Seizure, Bleeding Problems, HIV, Hepatitis, or Cancer. Surgeries: Prostate Surgery, Hernia Repair, and Cataract Surgery. Family History: Kidney Disease. DENIES: Prostate cancer, Kidney cancer, Kidney stones. Social History: Retired. . Smoking status: Never. Does not drink alcohol. System Review: Admits to: Constipation, Difficulty Walking, Dry Skin, and Shortness of Breath. DENIES: Unexplained Weight Loss, Dry Eyes, Dry Mouth, Leg Swelling, Involuntary Urine Loss, Lower Extremity Weakness, Psychiatric Problems, Impaired Sex Drive, Easy Bleeding, Rash.      EXAMINATION: Appearance: well-developed NAD       URINALYSIS    PSA HISTORY  0.874 ng/ml on 06/09/2021    Prescription(s) Today: Bactrim -160 mg tablet (sulfamethoxazole-trimethoprim) Take 1 tablet by mouth twice a day   #2 tablet x 0, 2021, Charli Sanchez MD, SIGNED    IMPRESSION:    1. BPH WITH OBSTRUCTION (KPP57-T46.1) - Unchanged: We long discussion with the patient and his family today about options and we repeated a void trial and we put about a liter in and he notes some urge to urinate but was unable. We sent him some Bactrim for prophylaxis. The catheter seems to be causing the more harm than good right now with the incontinence issues and is having some skin irritation. Advised that we probably should try to proceed with a laser vaporization which they would like performed under spinal anesthetic. Discussed that his contractile strength of the bladder may not be that great but we can at least give this a shot and see if we can improve upon things. Continue max medical management for now. cc: Pilo Schaefer M.D. Today's Services  E&M Service        Using the [facility] E/M guidelines, this encounter is coded as 85651 because counseling and/or coordination of care dominates more than 50% of the encounter (13 minutes face to face of a total 25 minute encounter).  This included discussion of the above problems and plans for treatment.       ]      Electronically signed by Charli Sanchez MD on 2021 at 1:26 PM    ________________________________________________________________________        Temp (24hrs), Av.8 °F (37.1 °C), Min:98 °F (36.7 °C), Max:99.4 °F (37.4 °C)    Urinary Status: Orta, Draining  Creatinine   Date/Time Value Ref Range Status   2021 11:06 PM 1.04 0.70 - 1.30 MG/DL Final   2021 12:01 AM 0.76 0.70 - 1.30 MG/DL Final   2021 09:04 PM 0.86 0.70 - 1.30 MG/DL Final   2021 12:47 PM 0.93 0.70 - 1.30 MG/DL Final     Current Antimicrobial Therapy (168h ago, onward)     Ordered     Start Stop    21 0824  cefTRIAXone (ROCEPHIN) 1 g in 0.9% sodium chloride (MBP/ADV) 50 mL MBP  1 g,   IntraVENous,   EVERY 24 HOURS      21 0900 21 0859              Key Anti-Platelet Anticoagulant Meds     The patient is on no antiplatelet meds or anticoagulants. Diet: ADULT DIET Regular -       Labs     Lab Results   Component Value Date/Time    Lactic acid 1.1 06/17/2021 11:06 PM    Lactic acid 1.7 04/24/2021 09:50 PM    WBC 7.4 06/17/2021 11:06 PM    HCT 36.1 (L) 06/17/2021 11:06 PM    PLATELET 031 36/14/6438 11:06 PM    Sodium 139 06/17/2021 11:06 PM    Potassium 3.9 06/17/2021 11:06 PM    Chloride 108 06/17/2021 11:06 PM    CO2 25 06/17/2021 11:06 PM    BUN 33 (H) 06/17/2021 11:06 PM    Creatinine 1.04 06/17/2021 11:06 PM    Glucose 122 (H) 06/17/2021 11:06 PM    Calcium 8.5 06/17/2021 11:06 PM    Magnesium 2.1 04/24/2021 09:04 PM    INR 0.9 06/14/2013 11:50 AM     UA:   Lab Results   Component Value Date/Time    Color DARK YELLOW 06/17/2021 11:06 PM    Appearance TURBID (A) 06/17/2021 11:06 PM    Specific gravity 1.030 06/17/2021 11:06 PM    pH (UA) 5.5 06/17/2021 11:06 PM    Protein 30 (A) 06/17/2021 11:06 PM    Glucose Negative 06/17/2021 11:06 PM    Ketone TRACE (A) 06/17/2021 11:06 PM    Bilirubin Negative 06/17/2021 11:06 PM    Urobilinogen 1.0 06/17/2021 11:06 PM    Nitrites Negative 06/17/2021 11:06 PM    Leukocyte Esterase LARGE (A) 06/17/2021 11:06 PM    Epithelial cells FEW 06/17/2021 11:06 PM    Bacteria 2+ (A) 06/17/2021 11:06 PM    WBC 20-50 06/17/2021 11:06 PM    RBC 0-5 06/17/2021 11:06 PM     Imaging     Results for orders placed during the hospital encounter of 06/17/21    CT ABD PELV WO CONT    Narrative  INDICATION: Fever, UTI, urinary retention. Exam: Noncontrast CT of the abdomen and pelvis is performed with 5 mm  collimation. Sagittal and coronal reformatted images were also performed. CT dose reduction was achieved through the use of a standardized protocol  tailored for this examination and automatic exposure control for dose  modulation. Direct comparison is made to prior CT dated April 2021.     FINDINGS:    There is very mild dependent atelectasis in the dependent right lower lobe. Abdomen:    Liver: The liver is normal on noncontrast images. Spleen: The spleen is normal on noncontrast images. Adrenals: The adrenals are normal on noncontrast images. Pancreas: The pancreas is normal on noncontrast images. Gallbladder: The gallbladder is normal on noncontrast images. Kidneys: There is no perinephric stranding, hydronephrosis or hydroureter. No  renal, ureteral bladder calculus is visualized. Bowel: No thickened or dilated loop of large or small bowel seen. Scattered  colonic diverticulosis is noted. Appendix: The appendix is normal.    Urinary bladder: The urinary bladder is partially filled. There is  circumferential prominence of the urinary bladder wall and there is subtle  stranding of the fat adjacent to the urinary bladder. Orta catheter is present  within the urinary bladder. Nondependent gas is noted within the urinary  bladder. Miscellaneous: There is no free intraperitoneal gas or fluid. There is no focal  fluid collection to suggest abscess. Impression  Circumferential urinary bladder wall prominence and subtle stranding  of the adjacent fat. Findings may represent cystitis. US Results (most recent):  No results found for this or any previous visit.       Cultures     All Micro Results     Procedure Component Value Units Date/Time    CULTURE, BLOOD, PAIRED [[id number]] Collected: 06/17/21 2306    Order Status: Completed Specimen: Blood Updated: 06/18/21 0839     Special Requests: NO SPECIAL REQUESTS        Culture result: NO GROWTH AFTER 8 HOURS       CULTURE, URINE [[id number]] Collected: 06/17/21 2307    Order Status: Completed Specimen: Cath Urine Updated: 06/18/21 0820     Special Requests: NO SPECIAL REQUESTS        Culture result: No growth (<1,000 CFU/ML)       COVID-19 RAPID TEST [[id number]] Collected: 06/18/21 0508    Order Status: Completed Specimen: Nasopharyngeal Updated: 06/18/21 0567     Specimen source Nasopharyngeal        COVID-19 rapid test Not detected        Comment: Rapid Abbott ID Now       Rapid NAAT:  The specimen is NEGATIVE for SARS-CoV-2, the novel coronavirus associated with COVID-19. Negative results should be treated as presumptive and, if inconsistent with clinical signs and symptoms or necessary for patient management, should be tested with an alternative molecular assay. Negative results do not preclude SARS-CoV-2 infection and should not be used as the sole basis for patient management decisions. This test has been authorized by the FDA under an Emergency Use Authorization (EUA) for use by authorized laboratories. Fact sheet for Healthcare Providers: PingStampco.nz  Fact sheet for Patients: SureBooks.nz       Methodology: Isothermal Nucleic Acid Amplification         URINE CULTURE HOLD SAMPLE [[id number]] Collected: 06/17/21 2306    Order Status: Completed Specimen: Urine from Serum Updated: 06/17/21 2317     Urine culture hold       Urine on hold in Microbiology dept for 2 days. If unpreserved urine is submitted, it cannot be used for addtional testing after 24 hours, recollection will be required.                  Past History: (Complete 2+/3 areas)   No Known Allergies   Current Facility-Administered Medications   Medication Dose Route Frequency    sodium chloride (NS) flush 5-40 mL  5-40 mL IntraVENous Q8H    sodium chloride (NS) flush 5-40 mL  5-40 mL IntraVENous PRN    acetaminophen (TYLENOL) tablet 650 mg  650 mg Oral Q6H PRN    Or    acetaminophen (TYLENOL) suppository 650 mg  650 mg Rectal Q6H PRN    polyethylene glycol (MIRALAX) packet 17 g  17 g Oral DAILY PRN    ondansetron (ZOFRAN ODT) tablet 4 mg  4 mg Oral Q8H PRN    Or    ondansetron (ZOFRAN) injection 4 mg  4 mg IntraVENous Q6H PRN    enoxaparin (LOVENOX) injection 40 mg  40 mg SubCUTAneous DAILY    cefTRIAXone (ROCEPHIN) 1 g in 0.9% sodium chloride (MBP/ADV) 50 mL MBP  1 g IntraVENous Q24H    finasteride (PROSCAR) tablet 5 mg  5 mg Oral DAILY    [START ON 6/19/2021] levothyroxine (SYNTHROID) tablet 50 mcg  50 mcg Oral ACB    tamsulosin (FLOMAX) capsule 0.4 mg  0.4 mg Oral DAILY    sertraline (ZOLOFT) tablet 50 mg  50 mg Oral DAILY    therapeutic multivitamin (THERAGRAN) tablet 1 Tablet  1 Tablet Oral DAILY    omega-3 acid ethyl esters (LOVAZA) capsule 1 Capsule  1 Capsule Oral DAILY    Prior to Admission medications    Medication Sig Start Date End Date Taking? Authorizing Provider   finasteride (PROSCAR) 5 mg tablet Take 1 Tab by mouth daily. 4/29/21   Keo Juan MD   tamsulosin (FLOMAX) 0.4 mg capsule Take 1 Cap by mouth daily. 4/29/21   Keo Juan MD   levothyroxine (SYNTHROID) 25 mcg tablet Take 50 mcg by mouth Daily (before breakfast). Provider, Historical   sertraline (Zoloft) 50 mg tablet Take 50 mg by mouth daily. Provider, Historical   multivitamin (ONE A DAY) tablet Take 1 Tab by mouth daily. Provider, Historical   omega-3 fatty acids-vitamin e (FISH OIL) 1,000 mg cap Take 1 Cap by mouth daily. Provider, Historical        PMHx:  has a past medical history of Arthritis, Asthma, Benign prostate hyperplasia, Dementia ([address] Hypothyroidism, and Spinal stenosis of lumbar region with radiculopathy (6/26/2013). PSurgHx:  has a past surgical history that includes hx other surgical and hx tonsil and adenoidectomy. PSocHx:  reports that he has never smoked. He has never used smokeless tobacco. He reports current alcohol use. He reports that he does not use drugs.    ROS:  (Complete - 10 systems) - unable to obtain due to above history of dementia    Physical Exam: (Comprehesive - 8+ 1995 Systems)     (1) Constitutional:  NAD; pleasant  FIO2:   on SpO2: O2 Sat (%): 97 %  O2 Device: None (Room air)    Patient Vitals for the past 24 hrs:   BP Temp Pulse Resp SpO2 Height Weight   06/18/21 0845 134/76 98 °F (36.7 °C) 80 16 97 %   06/18/21 0635 134/76 98.2 °F (36.8 °C) 83 16 95 %     06/18/21 0530 111/67  83 18 97 %     06/18/21 0500 109/66  80 16      06/18/21 0430 113/69 99.1 °F (37.3 °C) 78 17 95 %     06/18/21 0330 121/66  81 21 95 %     06/18/21 0300 111/74  87 11 97 %     06/18/21 0230 111/65  80 16 95 %     06/18/21 0200 105/79  72 15 96 %     06/18/21 0115 100/67 99.1 °F (37.3 °C) 86 21      06/18/21 0030 112/72  88 19 95 %     06/18/21 0000 117/65  89 18 95 %     06/17/21 2330 105/67  85 24 96 %     06/17/21 2313     93 %     06/17/21 2300 101/63  95 19 94 %     06/17/21 2258 97/64 99.4 °F (37.4 °C) 95 26 94 % 5' 10\" (1.778 m) 67.2 kg (148 lb 2.4 oz)       Date 06/17/21 0700 - 06/18/21 0659 06/18/21 0700 - 06/19/21 0659   Shift 8373-4976 6112-3578 24 Hour Total 0642-5023 6234-6659 24 Hour Total   INTAKE   Shift Total(mL/kg)         OUTPUT   Urine    850  850     Urine Voided    850  850   Shift Total(mL/kg)    850(12.6)  850(12.6)   NET    -850  -850   Weight (kg)  67.2 67.2 67.2 67.2 67.2      (2) ENMT:   moist mucous membranes, normal sinuses   (3) Respiratory:  breathing easily, no distress   (4) GI:  no abdominal masses, no tenderness   (5) :   +rodriguez with clear yellow UA, no CVA tenderness   (6) Lymphatic:  no adenopathy, neck supple   (7) Muscloskeletal:  no gross deformity, normal ROM   (8) Skin:  no rash, warm & dry   (9) Neuro:  Alert, confused at baseline       Signed By: Sheryle Coe, NP  - June 18, 2021

## 2021-06-18 NOTE — PROGRESS NOTES
[facility]  Hospitalist Group                                                                                          Hospitalist Progress Note  Perico Pearce MD  Answering service: [phone] from in house phone        Date of Service:  2021  NAME:  Geoffry Lefort  :  3/23/1933  MRN:  [id number]      Admission Summary:     Geoffry Lefort is a 80 y.o. male who is a history of dementia. During this examination, he is not verbalizing any answers to questions. Per record, patient was admitted on 2021 to [facility].  CT scan performed demonstrated significant bladder distention distention most likely related to chronic bladder outlet obstruction. Patient was started on finasteride and Flomax for benign prostate hyperplasia. Patient was seen by Urologist recommended that Rodriguez catheter remain in place until outpatient follow-up and voiding trial.  He was discharged on 2021.       Interval history / Subjective:     Patient poor historian, answer simple questions, wife at bedside     Assessment & Plan:     UTI indwelling rodriguez catheter associated POA  -patient had a temp of 102.4, tachycardia and seems lethargic in ER   -rodriguez cath was changed in the ER  -on IV ceftriaxone  -urine cx no significant growth on     Bladder outlet obstruction.    -Patient remains dependent on Rodriguez catheter. Will request urology consultation. BPH  -Continue Proscar, Flomax.  -Urologist is consulted    Hypothyroidism.    -Continue levothyroxine at home dose of 50 mcg p.o. daily.     Dementia   -conscious and alert, oriented to selft  -continue supportive care   -consult to PT/OT      Code status: Full Code  DVT prophylaxis: Lovenox    Care Plan discussed with: Patient/Family, Nurse and   Anticipated Disposition:  PT, OT, RN  Anticipated Discharge: 24 hours to 48 hours     Wife, Clarisse Walsh, [phone] at bedside, questions answered  Son contact number [phone] Problems  Never Reviewed        Codes Class Noted POA    Urinary tract infection associated with indwelling urethral catheter ([facility] ICD-10-CM: T83.511A, N39.0  ICD-9-CM: 996.64, 599.0  6/18/2021 Unknown              Vital Signs:    Last 24hrs VS reviewed since prior progress note. Most recent are:  Visit Vitals  /76 (BP 1 Location: Right upper arm, BP Patient Position: At rest)   Pulse 80   Temp 98 °F (36.7 °C)   Resp 16   Ht 5' 10\" (1.778 m)   Wt 67.2 kg (148 lb 2.4 oz)   SpO2 97%   BMI 21.26 kg/m²         Intake/Output Summary (Last 24 hours) at 6/18/2021 1238  Last data filed at 6/18/2021 1053  Gross per 24 hour   Intake    Output 850 ml   Net -850 ml        Physical Examination:     I had a face to face encounter with this patient and independently examined them on 6/18/2021 as outlined below:          Constitutional:  No acute distress, cooperative, pleasant    ENT:  Oral mucosa moist, oropharynx benign. Resp:  CTA bilaterally. No wheezing/rhonchi/rales. No accessory muscle use   CV:  Regular rhythm, normal rate, no murmurs, gallops, rubs    GI:  Soft, non distended, non tender. normoactive bowel sounds, no hepatosplenomegaly     Musculoskeletal:  No edema,      Neurologic:  Conscious and alert, oriented to self, Moves all extremities. Data Review:    Review and/or order of clinical lab test  Review and/or order of tests in the radiology section of CPT  Review and/or order of tests in the medicine section of CPT      Labs:     Recent Labs     06/17/21 2306   WBC 7.4   HGB 11.9*   HCT 36.1*        Recent Labs     06/17/21 2306      K 3.9      CO2 25   BUN 33*   CREA 1.04   *   CA 8.5     Recent Labs     06/17/21 2306   ALT 20   AP 86   TBILI 0.7   TP 6.1*   ALB 3.2*   GLOB 2.9     No results for input(s): INR, PTP, APTT, INREXT, INREXT in the last 72 hours. No results for input(s): FE, TIBC, PSAT, FERR in the last 72 hours. No results found for: FOL, RBCF   No results for input(s): PH, PCO2, PO2 in the last 72 hours. No results for input(s): CPK, CKNDX, TROIQ in the last 72 hours.     No lab exists for component: CPKMB  No results found for: CHOL, CHOLX, CHLST, CHOLV, HDL, HDLP, LDL, LDLC, DLDLP, TGLX, TRIGL, TRIGP, CHHD, CHHDX  No results found for: GLUCPOC  Lab Results   Component Value Date/Time    Color DARK YELLOW 06/17/2021 11:06 PM    Appearance TURBID (A) 06/17/2021 11:06 PM    Specific gravity 1.030 06/17/2021 11:06 PM    pH (UA) 5.5 06/17/2021 11:06 PM    Protein 30 (A) 06/17/2021 11:06 PM    Glucose Negative 06/17/2021 11:06 PM    Ketone TRACE (A) 06/17/2021 11:06 PM    Bilirubin Negative 06/17/2021 11:06 PM    Urobilinogen 1.0 06/17/2021 11:06 PM    Nitrites Negative 06/17/2021 11:06 PM    Leukocyte Esterase LARGE (A) 06/17/2021 11:06 PM    Epithelial cells FEW 06/17/2021 11:06 PM    Bacteria 2+ (A) 06/17/2021 11:06 PM    WBC 20-50 06/17/2021 11:06 PM    RBC 0-5 06/17/2021 11:06 PM         Medications Reviewed:     Current Facility-Administered Medications   Medication Dose Route Frequency    sodium chloride (NS) flush 5-40 mL  5-40 mL IntraVENous Q8H    sodium chloride (NS) flush 5-40 mL  5-40 mL IntraVENous PRN    acetaminophen (TYLENOL) tablet 650 mg  650 mg Oral Q6H PRN    Or    acetaminophen (TYLENOL) suppository 650 mg  650 mg Rectal Q6H PRN    polyethylene glycol (MIRALAX) packet 17 g  17 g Oral DAILY PRN    ondansetron (ZOFRAN ODT) tablet 4 mg  4 mg Oral Q8H PRN    Or    ondansetron (ZOFRAN) injection 4 mg  4 mg IntraVENous Q6H PRN    enoxaparin (LOVENOX) injection 40 mg  40 mg SubCUTAneous DAILY    cefTRIAXone (ROCEPHIN) 1 g in 0.9% sodium chloride (MBP/ADV) 50 mL MBP  1 g IntraVENous Q24H    finasteride (PROSCAR) tablet 5 mg  5 mg Oral DAILY    [START ON 6/19/2021] levothyroxine (SYNTHROID) tablet 50 mcg  50 mcg Oral ACB    tamsulosin (FLOMAX) capsule 0.4 mg  0.4 mg Oral DAILY    sertraline (ZOLOFT) tablet 50 mg  50 mg Oral DAILY    therapeutic multivitamin (THERAGRAN) tablet 1 Tablet  1 Tablet Oral DAILY    omega-3 acid ethyl esters (LOVAZA) capsule 1 Capsule  1 Capsule Oral DAILY     ______________________________________________________________________  EXPECTED LENGTH OF STAY: 2d 9h  ACTUAL LENGTH OF STAY:          0                 Michael Farmer MD

## 2021-06-18 NOTE — PROGRESS NOTES
Problem: Mobility Impaired (Adult and Pediatric)  Goal: *Acute Goals and Plan of Care (Insert Text)  Description: FUNCTIONAL STATUS PRIOR TO ADMISSION: Patient was independent and active without use of DME, ambulated to/ from the dining room with his wife for meals, fell just prior to admission, otherwise, no recent falls. HOME SUPPORT PRIOR TO ADMISSION: The patient lived with his wife in an AFL ([address] [place] [address]), staff assist with bathing 2x/ wk, private duty caregiver 4 hours 3-4 days/ wk to assist as needed. Physical Therapy Goals  Initiated 6/18/2021  1. Patient will move from supine to sit and sit to supine, scoot up and down, and roll side to side in bed with modified independence within 7 day(s). 2.  Patient will transfer from bed to chair and chair to bed with supervision/set-up using the least restrictive device within 7 day(s). 3.  Patient will perform sit to stand with supervision/set-up within 7 day(s). 4.  Patient will ambulate with supervision/set-up for 150 feet with the least restrictive device within 7 day(s). Outcome: Not Met   PHYSICAL THERAPY EVALUATION  Patient: Raz Guajardo (75 y.o. male)  Date: 6/18/2021  Primary Diagnosis: Urinary tract infection associated with indwelling urethral [facility] [A91.683M, N39.0]        Precautions:  Fall    ASSESSMENT  Based on the objective data described below, the patient presents with impaired balance, generalized weakness, memory impairment, and decreased endurance s/p admission with UTI. At baseline, patient is ambulatory without a device with a fall just prior to admission. Wife endorses history is otherwise not significant for falls. On assessment today, patient is able to transition positions and walk with min hand held assist with unsteady and altered gait. Attempted gait training with the rolling walker.    Although patient's balance improved with the walker, he continued to require min assist for sequencing and walker management. After walking 15 ft, pt let go of the walker and proceeded to walk back to the bed without the device. At this time, patient is a fall risk. Hopeful he will progress to baseline mobility as his infection clears and for return back to the familiar environment of his [facility]. At this time, patient would require additional caregiver assist in the home for transfers and ambulation (?? If senior care can accommodate vs family assisting). For the weekend, recommend with nursing, once Egress Test completed, OOB to chair 3x/day and walking daily with min hand held assist. Thank you for completing as able in order to maintain patient strength, endurance and independence. Current Level of Function Impacting Discharge (mobility/balance):   Supine<->Sit - Stand by assist  Sit<->Stand - CGA  Ambulation - 30 ft x2 with min hand held assist.      Functional Outcome Measure: The patient scored 10/28 on the Tinetti outcome measure which is indicative of high fall risk. Other factors to consider for discharge: Lives with wife in senior care, fall risk     Patient will benefit from skilled therapy intervention to address the above noted impairments. PLAN :  Recommendations and Planned Interventions: bed mobility training, transfer training, gait training, therapeutic exercises, neuromuscular re-education, patient and family training/education, and therapeutic activities      Frequency/Duration: Patient will be followed by physical therapy:  5 times a week to address goals. Recommendation for discharge: (in order for the patient to meet his/her long term goals)  Physical therapy at least 2 days/week in the home AND ensure assist and/or supervision for safety with ambulation and transfers    This discharge recommendation:  Has not yet been discussed the attending provider and/or case management    IF patient discharges home will need the following DME: none anticipated.   Has access to ilene, [place], and [place] SUBJECTIVE:   Patient did not conversate. Only answered questions with one to two word responses.       OBJECTIVE DATA SUMMARY:   HISTORY:    Past Medical History:   Diagnosis Date    Arthritis     Asthma     Benign prostate hyperplasia     Dementia ([facility]     Hypothyroidism     Spinal stenosis of lumbar region with radiculopathy 6/26/2013     Past Surgical History:   Procedure Laterality Date    HX OTHER SURGICAL      cyst removed from back    HX TONSIL AND ADENOIDECTOMY         Personal factors and/or comorbidities impacting plan of care: Dementia    Home Situation  Home Environment: Assisted living [facility])  One/Two Story Residence: One story  Living Alone: No  Support Systems: Spouse/Significant Other/Partner  Patient Expects to be Discharged to[de-identified] Assisted living  Current DME Used/Available at Home: Walker, rollator, Walker, rolling, Wheelchair  Tub or Shower Type: Shower    EXAMINATION/PRESENTATION/DECISION MAKING:   Critical Behavior:  Neurologic State: Alert, Confused  Orientation Level: Oriented to person, Disoriented to time, Disoriented to situation, Disoriented to place  Cognition: Follows commands  Safety/Judgement: Decreased awareness of environment  Hearing:  Appears intact  Skin:  Areas exposed appear intact  Edema: right hand (RN in to assess and loosen wrap around wrist)  Range Of Motion:  AROM: Generally decreased, functional                    Strength:    Strength: Generally decreased, functional                    Tone & Sensation:   Tone: Normal              Sensation: Intact               Coordination:  Coordination: Generally decreased, functional    Functional Mobility:  Bed Mobility:  Supine to Sit: Stand-by assistance  Sit to Supine: Stand-by assistance  Scooting: Stand-by assistance  Transfers:  Sit to Stand: Contact guard assistance  Stand to Sit: Contact guard assistance  (instructed in transfer technique when using RW,required cues for follow through)  Balance:   Sitting: Impaired; Without support  Sitting - Static: Good (unsupported); Fair (occasional)  Sitting - Dynamic: Fair (occasional); Occassional  Standing: Impaired; Without support  Standing - Static: Fair;Occasional  Standing - Dynamic : Fair;Occasional  Ambulation/Gait Training:  Distance (ft): 60 Feet (ft) (30 ft x2)  Assistive Device: Gait belt (HHA and again with RW)  Ambulation - Level of Assistance: Minimal assistance;Assist x1;Additional time  Gait Description (WDL): Exceptions to WDL  Gait Abnormalities: Decreased step clearance; Altered arm swing;Path deviations  Base of Support: Widened  Speed/Bhavani: Slow  Step Length: Right shortened;Left shortened  Instructed in walker technique. Cues provided for body position with the walker, hand placement. Min assist needed for walker management. Functional Measure:  Tinetti test:    Sitting Balance: 1  Arises: 0  Attempts to Rise: 1  Immediate Standing Balance: 0  Standing Balance: 0  Nudged: 0  Eyes Closed: 0  Turn 360 Degrees - Continuous/Discontinuous: 1  Turn 360 Degrees - Steady/Unsteady: 0  Sitting Down: 0  Balance Score: 3 Balance total score  Indication of Gait: 0  R Step Length/Height: 1  L Step Length/Height: 1  R Foot Clearance: 1  L Foot Clearance: 1  Step Symmetry: 1  Step Continuity: 1  Path: 1  Trunk: 0  Walking Time: 0  Gait Score: 7 Gait total score  Total Score: 10/28 Overall total score         Tinetti Tool Score Risk of Falls  <19 = High Fall Risk  19-24 = Moderate Fall Risk  25-28 = Low Fall Risk  Tinetti ME. Performance-Oriented Assessment of Mobility Problems in Elderly Patients. [place] 66; [id number].  (Scoring Description: PT Bulletin Feb. 10, 1993)    Older adults: Sabine Latif et al, 2009; n = 1000 [facility] elderly evaluated with ABC, TED, ADL, and IADL)  · Mean TED score for males aged 69-68 years = 26.21(3.40)  · Mean TED score for females age 69-68 years = 25.16(4.30)  · Mean TED score for males over 80 years = 23.29(6.02)  · Mean TED score for females over [de-identified] years = 17.20(8.32)            Physical Therapy Evaluation Charge Determination   History Examination Presentation Decision-Making   MEDIUM  Complexity : 1-2 comorbidities / personal factors will impact the outcome/ POC  LOW Complexity : 1-2 Standardized tests and measures addressing body structure, function, activity limitation and / or participation in recreation  LOW Complexity : Stable, uncomplicated  Other outcome measures Tinetti 10/28  HIGH       Based on the above components, the patient evaluation is determined to be of the following complexity level: LOW     Pain Rating:  None indicated    Activity Tolerance:   Fair and requires rest breaks    After treatment patient left in no apparent distress:   Supine in bed, Call bell within reach, Bed / chair alarm activated, Caregiver / family present, and Side rails x 3, OT in the room    COMMUNICATION/EDUCATION:   The patients plan of care was discussed with: Occupational therapist and Registered nurse. Fall prevention education was provided and the patient/caregiver indicated understanding., Patient/family have participated as able in goal setting and plan of care. , and Patient/family agree to work toward stated goals and plan of care.     Thank you for this referral.  Xu De La Fuente, PT   Time Calculation: 24 mins

## 2021-06-18 NOTE — ED TRIAGE NOTES
Patient arrives from [facility]. It is reported that the patient is a resident of [address] [place] [address]. EMS reports that he was discharged from the hospital recently where he had a rodriguez placed and was discharged with. EMS reports a fever 101.5 orally, and , and he is CAOx2 (Person ans time). EMS also reports a GLF and he was able to get back up into bed. The patient is hooked up to the monitor x 3.

## 2021-06-18 NOTE — PROGRESS NOTES
Problem: Self Care Deficits Care Plan (Adult)  Goal: *Acute Goals and Plan of Care (Insert Text)  Description:   FUNCTIONAL STATUS PRIOR TO ADMISSION: Patient was ambulating without use of DME     HOME SUPPORT: The patient lived with wife in assisted living. Had caregivers and aides coming in 3-4 days a week to assist with ADLs and IADLs as needed    Occupational Therapy Goals  Initiated 6/18/2021  1. Patient will perform toilet transfer with supervision/set-up within 4 day(s). 2.  Patient will perform upper body dressing with supervision/set-up within 4 day(s). 3.  Patient will perform lower body dressing with supervision/set-up within 4 day(s). 4.  Patient will perform all aspects of toileting with supervision/set-up within 4 day(s). 5.  Patient will utilize energy conservation techniques during functional activities with verbal cues within 4 day(s). Outcome: Progressing Towards Goal   OCCUPATIONAL THERAPY EVALUATION  Patient: Luis Enrique Walker (31 y.o. male)  Date: 6/18/2021  Primary Diagnosis: Urinary tract infection associated with indwelling urethral catheter ([facility] [P34.700B, N39.0]        Precautions:   Fall    ASSESSMENT  Based on the objective data described below, the patient presents with decreased functional mobility and activity tolerance due to UTI and declining cognitive status. Pt declined participation in any ADLs this session after just working with PT. He was oriented to person only. Per wife's report, patient was up and moving much better and ambulating without any DME prior to admission. They lived in assisted living and had aides coming in 3-4 days a week to help with ADLs and IADLs. Wife reporting that he is functioning below baseline at this time. Pt would benefit from skilled OT services to address self care skills and home safety. Current Level of Function Impacting Discharge (ADLs/self-care): mod-max A    Functional Outcome Measure:   The patient scored 25/1 on the Barthel Index outcome measure which is indicative of 75% impairment in self care skills. Other factors to consider for discharge: cognitive status     Patient will benefit from skilled therapy intervention to address the above noted impairments. PLAN :  Recommendations and Planned Interventions: self care training, functional mobility training, therapeutic exercise, therapeutic activities, endurance activities, patient education, and home safety training    Frequency/Duration: Patient will be followed by occupational therapy 3 times a week to address goals. Recommendation for discharge: (in order for the patient to meet his/her long term goals)  Occupational therapy at least 2 days/week in the home AND ensure assist and/or supervision for safety with spouse/staff    This discharge recommendation:  Has not yet been discussed the attending provider and/or case management    IF patient discharges home will need the following DME: patient owns DME required for discharge       SUBJECTIVE:   Patient stated I don't think I'm in any pain.     OBJECTIVE DATA SUMMARY:   HISTORY:   Past Medical History:   Diagnosis Date    Arthritis     Asthma     Benign prostate hyperplasia     [facility]     Hypothyroidism     Spinal stenosis of lumbar region with radiculopathy 6/26/2013     Past Surgical History:   Procedure Laterality Date    HX OTHER SURGICAL      cyst removed from back    HX TONSIL AND ADENOIDECTOMY         Expanded or extensive additional review of patient history:     Home Situation  Home Environment: Assisted living  One/Two Story Residence: One story  Living Alone: No  Support Systems: Spouse/Significant Other/Partner  Patient Expects to be Discharged to[de-identified] Assisted living  Current DME Used/Available at Home: Kiesha Momin, rollator, Walker, rolling    Hand dominance: Right    EXAMINATION OF PERFORMANCE DEFICITS:  Cognitive/Behavioral Status:  Neurologic State: Alert;Confused  Orientation Level: Oriented to person;Disoriented to time;Disoriented to situation;Disoriented to place  Cognition: Follows commands  Perception: Appears intact  Perseveration: No perseveration noted  Safety/Judgement: Decreased awareness of environment    Skin: appear intact    Edema: none noted    Hearing:       Vision/Perceptual:                                     Range of Motion:    AROM: Generally decreased, functional  PROM: Generally decreased, functional                      Strength:    Strength: Generally decreased, functional                Coordination:  Coordination: Generally decreased, functional  Fine Motor Skills-Upper: Left Intact; Right Intact    Gross Motor Skills-Upper: Left Intact; Right Intact    Tone & Sensation:    Tone: Normal  Sensation: Intact                ADL Assessment:  Feeding: Independent    Oral Facial Hygiene/Grooming: Minimum assistance    Bathing: Maximum assistance    Upper Body Dressing: Moderate assistance    Lower Body Dressing: Moderate assistance    Toileting: Maximum assistance                ADL Intervention and task modifications:   Patient received supine in bed with PT finishing up session. Pt's wife present in room and able to answer any questions. Pt was too fatigued to participate in ADLs at the moment. He was oriented to person only. His wife reports his cognitive status declining especially over the last 6 months. Per wife's report, patient was up and moving much better PTA. He was ambulating without any DME. They live in assisted living and have caregivers/aides to help with ADLs and IADLs as needed 3-4 days a week. Pt left supine in bed with call bell within reach and wife at bedside.                                   Cognitive Retraining  Safety/Judgement: Decreased awareness of environment      Functional Measure:  Barthel Index:    Bathin  Bladder: 0  Bowels: 0  Groomin  Dressin  Feeding: 10  Mobility: 0  Stairs: 0  Toilet Use: 0  Transfer (Bed to Chair and Back): 5  Total: 25/100        The Barthel ADL Index: Guidelines  1. The index should be used as a record of what a patient does, not as a record of what a patient could do. 2. The main aim is to establish degree of independence from any help, physical or verbal, however minor and for whatever reason. 3. The need for supervision renders the patient not independent. 4. A patient's performance should be established using the best available evidence. Asking the patient, friends/relatives and nurses are the usual sources, but direct observation and common sense are also important. However direct testing is not needed. 5. Usually the patient's performance over the preceding 24-48 hours is important, but occasionally longer periods will be relevant. 6. Middle categories imply that the patient supplies over 50 per cent of the effort. 7. Use of aids to be independent is allowed. Anshul Anguiano., Barthel, D.W. ([id number]). Functional evaluation: the Barthel Index. [facility] (14)2. Phillip Lam johnnie KEVIN Clark, Steven Hatfield., Aminata Shelby., [place], [address] [place] [address] (1999). Measuring the change indisability after inpatient rehabilitation; comparison of the responsiveness of the Barthel Index and Functional Luana Measure. Journal of Neurology, Neurosurgery, and Psychiatry, 66(4), 561-321. Amador Mehta, N.J.A, DAYDAY Cortes, & Rishabh Mayorga MROSANGELA. (2004.) Assessment of post-stroke quality of life in cost-effectiveness studies: The usefulness of the Barthel Index and the EuroQoL-5D. Quality of Life Research, 15, 397-11        Occupational Therapy Evaluation Charge Determination   History Examination Decision-Making   LOW Complexity : Brief history review  MEDIUM Complexity : 3-5 performance deficits relating to physical, cognitive , or psychosocial skils that result in activity limitations and / or participation restrictions MEDIUM Complexity : Patient may present with comorbidities that affect occupational performnce.  Miniml to moderate modification of tasks or assistance (eg, physical or verbal ) with assesment(s) is necessary to enable patient to complete evaluation       Based on the above components, the patient evaluation is determined to be of the following complexity level: LOW   Pain Rating:  No pain noted    Activity Tolerance:   Poor    After treatment patient left in no apparent distress:    Supine in bed, Bed / chair alarm activated, Caregiver / family present, and Side rails x 3    COMMUNICATION/EDUCATION:   The patients plan of care was discussed with: Physical therapist.     Patient/family have participated as able in goal setting and plan of care. This patients plan of care is appropriate for delegation to [facility]. Thank you for this referral.  Lou Love    Regarding student involvement in patient care:  A student participated in this treatment session. Per CMS Medicare statements and AOTA guidelines I certify that the following was true:  1. I was present and directly observed the entire session. 2. I made all skilled judgments and clinical decisions regarding care. 3. I am the practitioner responsible for assessment, treatment, and documentation.

## 2021-06-18 NOTE — ED PROVIDER NOTES
HPI     75-year-old male with a history of spinal stenosis, asthma, arthritis, dementia, presents the emergency department with fever of 102.4 and lethargy. Per his assisted living he had a Orta catheter taken out yesterday. He developed a temperature of 100.4 today so they took [facility] to the retreat to see his urologist who put in another Orta and started him on Levaquin. He had 1 dose of Levaquin today. This evening his temperature went up to 102.4 he seems lethargic his pulse was 127 his blood pressure was 95/45. He was sent to the emergency room for further evaluation. Per his assisted living facility he had a Orta taken out yesterday. He did not develop a fever and lethargy until today he had otherwise been at his baseline. Past Medical History:   Diagnosis Date    Arthritis     Asthma     Spinal stenosis of lumbar region with radiculopathy 6/26/2013       Past Surgical History:   Procedure Laterality Date    HX HEENT      T & A    HX OTHER SURGICAL      cyst removed from back         No family history on file. Social History     Socioeconomic History    Marital status:      Spouse name: Not on file    Number of children: Not on file    Years of education: Not on file    Highest education level: Not on file   Occupational History    Not on file   Tobacco Use    Smoking status: Never Smoker   Substance and Sexual Activity    Alcohol use: Yes     Comment: occ.  Drug use: Not on file    Sexual activity: Not on file   Other Topics Concern    Not on file   Social History Narrative    Not on file     Social Determinants of Health     Financial Resource Strain:     Difficulty of Paying Living Expenses:    Food Insecurity:     Worried About Running Out of Food in the Last Year:     [address] in the Last Year:    Transportation Needs:     Lack of Transportation (Medical):      Lack of Transportation (Non-Medical):    Physical Activity:     Days of Exercise per Week:     Minutes of Exercise per Session:    Stress:     Feeling of Stress :    Social Connections:     Frequency of Communication with Friends and Family:     Frequency of Social Gatherings with Friends and Family:     Attends Jewish Services:     Active Member of Clubs or Organizations:     Attends Club or Organization Meetings:     Marital Status:    Intimate Partner Violence:     Fear of Current or Ex-Partner:     Emotionally Abused:     Physically Abused:     Sexually Abused: ALLERGIES: Patient has no known allergies. Review of Systems   Unable to perform ROS: Dementia       There were no vitals filed for this visit. Physical Exam  Constitutional:       General: He is not in acute distress. Appearance: He is well-developed. HENT:      Head: Normocephalic and atraumatic. Mouth/Throat:      Pharynx: No oropharyngeal exudate. Eyes:      General: No scleral icterus. Right eye: No discharge. Left eye: No discharge. Pupils: Pupils are equal, round, and reactive to light. Neck:      Vascular: No JVD. Cardiovascular:      Rate and Rhythm: Normal rate and regular rhythm. Heart sounds: Normal heart sounds. No murmur heard. Pulmonary:      Effort: Pulmonary effort is normal. No respiratory distress. Breath sounds: Normal breath sounds. No stridor. No wheezing or rales. Chest:      Chest wall: No tenderness. Abdominal:      General: Bowel sounds are normal. There is no distension. Palpations: Abdomen is soft. There is no mass. Tenderness: There is no abdominal tenderness. There is no guarding or rebound. Musculoskeletal:         General: Normal range of motion. Cervical back: Normal range of motion and neck supple. Skin:     General: Skin is warm and dry. Capillary Refill: Capillary refill takes less than 2 seconds. Findings: No rash. Neurological:      Mental Status: He is oriented to person, place, and time. Mental status is at baseline. Psychiatric:         Behavior: Behavior normal.         Thought Content: Thought content normal.         Judgment: Judgment normal.          MDM       Procedures      ED EKG interpretation:  Rhythm: normal sinus rhythm; and regular . Rate (approx.): 96; Axis: normal; P wave: normal; QRS interval: normal ; ST/T wave: non-specific changes; This EKG was interpreted by Meena Schulz MD,ED Provider. Labs ok except for 10% bands. Given age, fever >102, indwelling rodriguez, will admit for IV antibiotics. Perfect Serve Consult for Admission  2:24 AM    ED Room Number: ER12/12  Patient Name and age:  Jennifer Foster 80 y.o.  male  Working Diagnosis:   1. Urinary tract infection associated with indwelling urethral catheter, initial encounter ([facility] [address]        COVID-19 Suspicion:  no  Sepsis present:  no  Reassessment needed: no  Code Status:  Full Code  Readmission: no  Isolation Requirements:  no  Recommended Level of Care:  telemetry  Department:[facility] Adult ED - 21   Other:  80year old male with dementia, presents with fever 102 at home with lethargy. Has had indwelling rodriguez off and on since last admission in April. Had catheter taken out on Wed and put back yesterday. Labs are ok, total WBC 7.4 but 10% bands. Lactate ok. Received Cefepime.

## 2021-06-18 NOTE — PROGRESS NOTES
JERO: Patient resides at [facility] # [phone] with his wife. Anticipate return to [facility] when medically stable for discharge, need to confirm with [facility] prior to discharge. Son Rashida Liriano [phone]  will assist with transportation at discharge. Care Management Interventions  PCP Verified by CM: Yes  Mode of Transport at Discharge: Other (see comment) (family/car)  Transition of Care Consult (CM Consult): Discharge Planning  MyChart Signup: No  Discharge Durable Medical Equipment: No  Physical Therapy Consult: Yes  Occupational Therapy Consult: Yes  Speech Therapy Consult: No  Current Support Network: Lives with Spouse, Assisted Living  Confirm Follow Up Transport: Family  The Patient and/or Patient Representative was Provided with a Choice of Provider and Agrees with the Discharge Plan?: Yes  Freedom of Choice List was Provided with Basic Dialogue that Supports the Patient's Individualized Plan of Care/Goals, Treatment Preferences and Shares the Quality Data Associated with the Providers?: Yes  Discharge Location  Discharge Placement: Assisted Living    Reason for Admission:  Urinary tract infection associated with indwelling urethral catheter                     RUR Score:      13%               Plan for utilizing home health: If home health is needed, the [facility] has their own therapy in-house. CM to fax orders if needed. PCP: First and Last name:  Anna Pichardo MD Dr. Merrie Aran     Name of Practice:    Are you a current patient: Yes/No: yes   Approximate date of last visit:    Can you participate in a virtual visit with your PCP:                     Current Advanced Directive/Advance Care Plan: Full Code      Healthcare Decision Maker:   Click here to complete [address] including selection of the Healthcare Decision Maker Relationship (ie \"Primary\")                             Transition of Care Plan:     Return to [facility]                   Chart reviewed.  CM met with patient and wife at bedside. Wife stated that her and the patient assist each other with ADLs, and [facility] staff assists with bathing. Patient does not use any DME, however wife states that they own a rollator and wheelchair. They no longer drive, but still own a car and they will often hire an aid to drive them places if needed. Patient and wife have both had the covid vaccine. Wife stated they anticipate returning to [facility] when medically stable for discharge. ESPERAN[facility] called [address] [facility] Phone: [phone] and spoke with Barbara Calvert. Daniela Enrique stated that facility assists this patient with bathing, dressing, and toileting. CM faxed updated clinicals to facility at J#[phone]. If patient is ready to be discharged over the weekend, the facility will need to be contacted prior to discharge.      Hal Rodriguez, RAYSHAWNW/CRM

## 2021-06-18 NOTE — PROGRESS NOTES
Bedside and Verbal shift change report given to Benigno Andrew RN (oncoming nurse) by Lizbeth Hartman RN (offgoing nurse). Report included the following information SBAR, Kardex, Intake/Output and MAR.

## 2021-06-18 NOTE — PROGRESS NOTES
56:  RN notified MD that patient's son had a number of questions pertaining to what urology had told patient's wife. Per MD he would be around to speak with wife and son. [phone]:  Per MD patient does not require remote telemetry. 2011:  Bedside shift change report given to River Macedo RN (oncoming nurse) by Blanca Craig RN (offgoing nurse). Report included the following information SBAR, Kardex, Intake/Output, MAR and Recent Results.

## 2021-06-18 NOTE — H&P
History and Physical    Patient: Mindy Sheppard MRN: [id number]  SSN: [id number]    YOB: 1933  Age: 80 y.o. Sex: male      Assessment/plan:   1. Urinary tract infection in indwelling Orta catheter. Patient was given 1 dose of Levaquin prior to presentation to the emergency department late last night. Patient was given 1 dose of IV cefepime after presentation to the emergency department. Will change antibiotics to IV ceftriaxone. Waiting urine culture results. 2.  Bladder outlet obstruction. Patient remains dependent on Orta catheter. Will request urology consultation. 3.  Benign prostate hyperplasia. Continue Proscar, Flomax. 4.  Dementia. 5.  Hypothyroidism. Continue levothyroxine at home dose of 50 mcg p.o. daily. Subjective:      Mindy Sheppard is a 80 y.o. male who is a history of dementia. During this examination, he is not verbalizing any answers to questions. He tried to push me away when I was examining him. Per record, patient was admitted on 4/24/2021 to [facility].  CT scan performed demonstrated significant bladder distention distention most likely related to chronic bladder outlet obstruction. Patient was started on finasteride and Flomax for benign prostate hyperplasia. Patient was seen by Urologist recommended that Orta catheter remain in place until outpatient follow-up and voiding trial.  He was discharged on 4/28/2021. Per emergency department notes by Dr. Cherry Curiel, patient presented in the late night of 6/17/2021 to [facility] emergency department with a fever of 102.4 and lethargy. Per his assisted living he had a Orta catheter taken out on 6/16/2021. He developed a temperature of 100.4 on 6/17/2021 so they took [facility] to the retreat to see his urologist who put in another Orta and started him on Levaquin.   He had 1 dose of Levaquin 6/17/2021 before coming to the hospital.  At evening his temperature went up to 102.4, he seemsed lethargic, his pulse was 127 and his blood pressure was 95/45. He was sent to the emergency room for further evaluation. Past Medical History:   Diagnosis Date    Arthritis     Asthma     Benign prostate hyperplasia     Dementia ([address]     Hypothyroidism     Spinal stenosis of lumbar region with radiculopathy 6/26/2013     Past Surgical History:   Procedure Laterality Date    HX OTHER SURGICAL      cyst removed from back    HX TONSIL AND ADENOIDECTOMY        History reviewed. No pertinent family history. Social History     Tobacco Use    Smoking status: Never Smoker    Smokeless tobacco: Never Used   Substance Use Topics    Alcohol use: Yes     Comment: occ. Prior to Admission medications    Medication Sig Start Date   finasteride (PROSCAR) 5 mg tablet Take 1 Tab by mouth daily. 4/29/21   tamsulosin (FLOMAX) 0.4 mg capsule Take 1 Cap by mouth daily. 4/29/21   levothyroxine (SYNTHROID) 25 mcg tablet Take 50 mcg by mouth Daily (before breakfast). sertraline (Zoloft) 50 mg tablet Take 50 mg by mouth daily. multivitamin (ONE A DAY) tablet Take 1 Tab by mouth daily. omega-3 fatty acids-vitamin e (FISH OIL) 1,000 mg cap Take 1 Cap by mouth daily. No Known Allergies    Review of Systems:  Review of systems not obtained due to patient factors.     Objective:     Patient Vitals for the past 24 hrs:   BP Temp Pulse Resp SpO2  oxygen therapy   06/18/21 0430 113/69 99.1 °F (37.3 °C) 78 17 95 %  room air   06/18/21 0330 121/66  81 21 95 %   room air   06/18/21 0300 111/74  87 11 97 %   room air   06/18/21 0230 111/65  80 16 95 %   room air   06/18/21 0200 105/79  72 15 96 %  room air   06/18/21 0115 100/67 99.1 °F (37.3 °C) 86 21   room air   06/18/21 0030 112/72  88 19 95 %  room air   06/18/21 0000 117/65  89 18 95 %  room air   06/17/21 2330 105/67  85 24 96 %  room air   06/17/21 2313     93 %  room air   06/17/21 2300 101/63  95 19 94 %  room air   06/17/21 2258 97/64 99.4 °F (37.4 °C) 95 26 94 %   room air        Physical Exam:  Estimated body mass index is 21.26 kg/m² as calculated from the following:    Height as of this encounter: 5' 10\" (1.778 m). Weight as of this encounter: 67.2 kg (148 lb 2.4 oz). General: In no acute distress. Patient is pushing away examiner gently. Well developed, well nourished. Head: Normocephalic, atraumatic. Eyes: Anicteric sclera. Extraocular muscles intact. ENT: External ears and nose appear normal.  No lip ulcers. Neck: Supple. No jugular venous distention. Heart: Regular rate and rhythm. No murmurs appreciated. Chest: Symmetrical excursion. Clear to auscultation bilaterally. Abdomen: Soft. Patient does not grimace with palpation of abdomen although in general, is pushing away examiner. No abnormal distention. Bowel sounds are present. Extremities: No gross deformities. No edema, no cyanosis. Neurological: Moving all extremities spontaneously. Awake. Skin: No jaundice. No rashes. Recent Results (from the past 24 hour(s))   EKG, 12 LEAD, INITIAL    Collection Time: 06/17/21 11:04 PM   Result Value Ref Range    Ventricular Rate 96 BPM    Atrial Rate 96 BPM    P-R Interval 172 ms    QRS Duration 80 ms    Q-T Interval 350 ms    QTC Calculation (Bezet) 442 ms    Calculated P Axis 55 degrees    Calculated R Axis 22 degrees    Calculated T Axis 43 degrees    Diagnosis Normal sinus rhythm  No previous ECGs available      SAMPLES BEING HELD    Collection Time: 06/17/21 11:06 PM   Result Value Ref Range    SAMPLES BEING HELD 1RED 1BLUE     COMMENT        Add-on orders for these samples will be processed based on acceptable specimen integrity and analyte stability, which may vary by analyte.    CBC WITH AUTOMATED DIFF    Collection Time: 06/17/21 11:06 PM   Result Value Ref Range    WBC 7.4 4.1 - 11.1 K/uL    RBC 3.95 (L) 4.10 - 5.70 M/uL    HGB 11.9 (L) 12.1 - 17.0 g/dL    HCT 36.1 (L) 36.6 - 50.3 %    MCV 91.4 80.0 - 99.0 FL    MCH 30.1 26.0 - 34.0 PG    MCHC 33.0 30.0 - 36.5 g/dL    RDW 13.2 11.5 - 14.5 %    PLATELET 586 301 - 810 K/uL    MPV 9.7 8.9 - 12.9 FL    NRBC 0.0 0  WBC    ABSOLUTE NRBC 0.00 0.00 - 0.01 K/uL    NEUTROPHILS 72 32 - 75 %    BAND NEUTROPHILS 10 (H) 0 - 6 %    LYMPHOCYTES 8 (L) 12 - 49 %    MONOCYTES 4 (L) 5 - 13 %    EOSINOPHILS 6 0 - 7 %    BASOPHILS 0 0 - 1 %    IMMATURE GRANULOCYTES 0 %    ABS. NEUTROPHILS 6.1 1.8 - 8.0 K/UL    ABS. LYMPHOCYTES 0.6 (L) 0.8 - 3.5 K/UL    ABS. MONOCYTES 0.3 0.0 - 1.0 K/UL    ABS. EOSINOPHILS 0.4 0.0 - 0.4 K/UL    ABS. BASOPHILS 0.0 0.0 - 0.1 K/UL    ABS. IMM. GRANS. 0.0 K/UL    DF MANUAL      RBC COMMENTS NORMOCYTIC, NORMOCHROMIC     METABOLIC PANEL, COMPREHENSIVE    Collection Time: 06/17/21 11:06 PM   Result Value Ref Range    Sodium 139 136 - 145 mmol/L    Potassium 3.9 3.5 - 5.1 mmol/L    Chloride 108 97 - 108 mmol/L    CO2 25 21 - 32 mmol/L    Anion gap 6 5 - 15 mmol/L    Glucose 122 (H) 65 - 100 mg/dL    BUN 33 (H) 6 - 20 MG/DL    Creatinine 1.04 0.70 - 1.30 MG/DL    BUN/Creatinine ratio 32 (H) 12 - 20      GFR est AA >60 >60 ml/min/1.73m2    GFR est non-AA >60 >60 ml/min/1.73m2    Calcium 8.5 8.5 - 10.1 MG/DL    Bilirubin, total 0.7 0.2 - 1.0 MG/DL    ALT (SGPT) 20 12 - 78 U/L    AST (SGOT) 18 15 - 37 U/L    Alk.  phosphatase 86 45 - 117 U/L    Protein, total 6.1 (L) 6.4 - 8.2 g/dL    Albumin 3.2 (L) 3.5 - 5.0 g/dL    Globulin 2.9 2.0 - 4.0 g/dL    A-G Ratio 1.1 1.1 - 2.2     LACTIC ACID    Collection Time: 06/17/21 11:06 PM   Result Value Ref Range    Lactic acid 1.1 0.4 - 2.0 MMOL/L   URINALYSIS W/MICROSCOPIC    Collection Time: 06/17/21 11:06 PM   Result Value Ref Range    Color DARK YELLOW      Appearance TURBID (A) CLEAR      Specific gravity 1.030      pH (UA) 5.5 5.0 - 8.0      Protein 30 (A) NEG mg/dL    Glucose Negative NEG mg/dL    Ketone TRACE (A) NEG mg/dL    Bilirubin Negative NEG      Blood MODERATE (A) NEG      Urobilinogen 1.0 0.2 - 1.0 EU/dL Nitrites Negative NEG      Leukocyte Esterase LARGE (A) NEG      WBC 20-50 0 - 4 /hpf    RBC 0-5 0 - 5 /hpf    Epithelial cells FEW FEW /lpf    Bacteria 2+ (A) NEG /hpf    Mucus 1+ (A) NEG /lpf   URINE CULTURE HOLD SAMPLE    Collection Time: 06/17/21 11:06 PM    Specimen: Serum; Urine   Result Value Ref Range    Urine culture hold        Urine on hold in Microbiology dept for 2 days. If unpreserved urine is submitted, it cannot be used for addtional testing after 24 hours, recollection will be required. COVID-19 RAPID TEST    Collection Time: 06/18/21  5:08 AM   Result Value Ref Range    Specimen source Nasopharyngeal      COVID-19 rapid test Not detected NOTD       CT ABD PELV WO CONT on 6/18/2021:  Narrative: INDICATION: Fever, UTI, urinary retention. Exam: Noncontrast CT of the abdomen and pelvis is performed with 5 mm  collimation. Sagittal and coronal reformatted images were also performed. CT dose reduction was achieved through the use of a standardized protocol  tailored for this examination and automatic exposure control for dose  modulation. Direct comparison is made to prior CT dated April 2021. FINDINGS:    There is very mild dependent atelectasis in the dependent right lower lobe. Abdomen:     Liver: The liver is normal on noncontrast images. Spleen: The spleen is normal on noncontrast images. Adrenals: The adrenals are normal on noncontrast images. Pancreas: The pancreas is normal on noncontrast images. Gallbladder: The gallbladder is normal on noncontrast images. Kidneys: There is no perinephric stranding, hydronephrosis or hydroureter. No  renal, ureteral bladder calculus is visualized. Bowel: No thickened or dilated loop of large or small bowel seen. Scattered  colonic diverticulosis is noted. Appendix: The appendix is normal.    Urinary bladder: The urinary bladder is partially filled.  There is  circumferential prominence of the urinary bladder wall and there is subtle  stranding of the fat adjacent to the urinary bladder. Orta catheter is present  within the urinary bladder. Nondependent gas is noted within the urinary  bladder. Miscellaneous: There is no free intraperitoneal gas or fluid. There is no focal  fluid collection to suggest abscess. Impression: Circumferential urinary bladder wall prominence and subtle stranding  of the adjacent fat. Findings may represent cystitis.       Signed By: Harry Jimenez DO     June 18, 2021

## 2021-06-18 NOTE — PROGRESS NOTES
Pharmacist Admission Medication Reconciliation:     Reviewed patient wife's handwritten list (outdated), [facility] and [facility] pharmacist info, outpatient neurology notes, and with patient's sons who help w/ meds. Rx Query data available? ¹ YES  Reviewed active and held orders. YES     Medication Notes:  NOT taking memantine or donepezil, despite being on wife's handwritten list. Confirmed taking galantamine ER instead, which family can supply. Added B12 and D3 supplements per son, removed MVI. Confirmed doses of sertraline and levothyroxine w/ [facility], and matched son's list.     Thank you for allowing me to participate in this patient's care. Please call or [phone] with any questions. Senora Boxer, Pharmacist             [facility] pharmacy benefit data reflects medications filled and processed through the patient's insurance,   however this data does NOT capture whether the medication was picked up or is currently being taken by the patient. Prior to Admission Medications:   Prior to Admission Medications   Prescriptions Last Dose Informant Taking? CHOLECALCIFEROL, VITAMIN D3, PO   Yes   Sig: Take 5,000 Units by mouth daily. CYANOCOBALAMIN, VITAMIN B-12,   Yes   Sig: Dose unknown. OTHER   Yes   Sig: Prevagen   finasteride (PROSCAR) 5 mg tablet   Yes   Sig: Take 1 Tab by mouth daily. galantamine (RAZADYNE) 16 mg SR capsule   Yes   Sig: Take 16 mg by mouth Daily (before breakfast). ginkgo biloba (GINKGO PO)   Yes   Sig: Take  by mouth.   levothyroxine (SYNTHROID) 75 mcg tablet   Yes   Sig: Take 75 mcg by mouth Daily (before breakfast). omega-3 fatty acids-vitamin e (FISH OIL) 1,000 mg cap  Self Yes   Sig: Take 1 Cap by mouth daily. sertraline (Zoloft) 50 mg tablet   Yes   Sig: Take 50 mg by mouth daily. tamsulosin (FLOMAX) 0.4 mg capsule   Yes   Sig: Take 1 Cap by mouth daily.       Facility-Administered Medications: None

## 2021-06-19 VITALS
HEART RATE: 96 BPM | BODY MASS INDEX: 21.21 KG/M2 | TEMPERATURE: 97.7 F | HEIGHT: 70 IN | DIASTOLIC BLOOD PRESSURE: 92 MMHG | OXYGEN SATURATION: 93 % | RESPIRATION RATE: 16 BRPM | SYSTOLIC BLOOD PRESSURE: 141 MMHG | WEIGHT: 148.15 LBS

## 2021-06-19 LAB
ANION GAP SERPL CALC-SCNC: 8 MMOL/L (ref 5–15)
BUN SERPL-MCNC: 18 MG/DL (ref 6–20)
BUN/CREAT SERPL: 24 (ref 12–20)
CALCIUM SERPL-MCNC: 8.7 MG/DL (ref 8.5–10.1)
CHLORIDE SERPL-SCNC: 109 MMOL/L (ref 97–108)
CO2 SERPL-SCNC: 22 MMOL/L (ref 21–32)
CREAT SERPL-MCNC: 0.75 MG/DL (ref 0.7–1.3)
ERYTHROCYTE [DISTWIDTH] IN BLOOD BY AUTOMATED COUNT: 12.8 % (ref 11.5–14.5)
GLUCOSE SERPL-MCNC: 130 MG/DL (ref 65–100)
HCT VFR BLD AUTO: 36.2 % (ref 36.6–50.3)
HGB BLD-MCNC: 12.1 G/DL (ref 12.1–17)
MCH RBC QN AUTO: 29.8 PG (ref 26–34)
MCHC RBC AUTO-ENTMCNC: 33.4 G/DL (ref 30–36.5)
MCV RBC AUTO: 89.2 FL (ref 80–99)
NRBC # BLD: 0 K/UL (ref 0–0.01)
NRBC BLD-RTO: 0 PER 100 WBC
PLATELET # BLD AUTO: 192 K/UL (ref 150–400)
PMV BLD AUTO: 9.8 FL (ref 8.9–12.9)
POTASSIUM SERPL-SCNC: 3.8 MMOL/L (ref 3.5–5.1)
RBC # BLD AUTO: 4.06 M/UL (ref 4.1–5.7)
SODIUM SERPL-SCNC: 139 MMOL/L (ref 136–145)
WBC # BLD AUTO: 6.4 K/UL (ref 4.1–11.1)

## 2021-06-19 PROCEDURE — 74011250636 HC RX REV CODE- 250/636: Performed by: HOSPITALIST

## 2021-06-19 PROCEDURE — 74011250637 HC RX REV CODE- 250/637: Performed by: HOSPITALIST

## 2021-06-19 PROCEDURE — 80048 BASIC METABOLIC PNL TOTAL CA: CPT

## 2021-06-19 PROCEDURE — 74011250637 HC RX REV CODE- 250/637: Performed by: INTERNAL MEDICINE

## 2021-06-19 PROCEDURE — 74011000258 HC RX REV CODE- 258: Performed by: HOSPITALIST

## 2021-06-19 PROCEDURE — 36415 COLL VENOUS BLD VENIPUNCTURE: CPT

## 2021-06-19 PROCEDURE — 85027 COMPLETE CBC AUTOMATED: CPT

## 2021-06-19 PROCEDURE — 74011250636 HC RX REV CODE- 250/636: Performed by: INTERNAL MEDICINE

## 2021-06-19 RX ORDER — CEFUROXIME AXETIL 250 MG/1
250 TABLET ORAL 2 TIMES DAILY
Qty: 10 TABLET | Refills: 0 | Status: SHIPPED | OUTPATIENT
Start: 2021-06-19 | End: 2021-06-24

## 2021-06-19 RX ADMIN — Medication 100 MCG: at 09:22

## 2021-06-19 RX ADMIN — THERA TABS 1 TABLET: TAB at 09:22

## 2021-06-19 RX ADMIN — TAMSULOSIN HYDROCHLORIDE 0.4 MG: 0.4 CAPSULE ORAL at 09:22

## 2021-06-19 RX ADMIN — FINASTERIDE 5 MG: 5 TABLET, FILM COATED ORAL at 09:33

## 2021-06-19 RX ADMIN — LEVOTHYROXINE SODIUM 75 MCG: 0.07 TABLET ORAL at 07:19

## 2021-06-19 RX ADMIN — Medication 5000 UNITS: at 09:32

## 2021-06-19 RX ADMIN — SERTRALINE 50 MG: 50 TABLET, FILM COATED ORAL at 09:22

## 2021-06-19 RX ADMIN — ENOXAPARIN SODIUM 40 MG: 40 INJECTION SUBCUTANEOUS at 09:21

## 2021-06-19 RX ADMIN — CEFTRIAXONE SODIUM 1 G: 1 INJECTION, POWDER, FOR SOLUTION INTRAMUSCULAR; INTRAVENOUS at 09:21

## 2021-06-19 RX ADMIN — OMEGA-3-ACID ETHYL ESTERS 1 CAPSULE: 1 CAPSULE, LIQUID FILLED ORAL at 09:33

## 2021-06-19 NOTE — DISCHARGE SUMMARY
Discharge Summary       PATIENT ID: Dung Guido  MRN: [id number]   YOB: 1933    DATE OF ADMISSION: 6/17/2021 10:40 PM    DATE OF DISCHARGE: 06/19/2021   PRIMARY CARE PROVIDER: Tessa Lyon MD     ATTENDING PHYSICIAN: Trice Dominique MD  DISCHARGING PROVIDER: Arturo Mathews MD    To contact this individual call [phone] and ask the  to page. If unavailable ask to be transferred the Adult Hospitalist Department. CONSULTATIONS: IP CONSULT TO UROLOGY    PROCEDURES/SURGERIES: * No surgery found *    ADMITTING DIAGNOSES & HOSPITAL COURSE:     Arthur Simon a 80 y. o. male who is a history of dementia.  During this examination, he is not verbalizing any answers to questions. Per record, patient was admitted on 4/24/2021 to [facility].  CT scan performed demonstrated significant bladder distention distention most likely related to chronic bladder outlet obstruction.  Patient was started on finasteride and Flomax for benign prostate hyperplasia.  Patient was seen by Urologist recommended that Rodriguez catheter remain in place until outpatient follow-up and voiding trial. Brady Harvey was discharged on 4/28/2021.     UTI indwelling rodriguez catheter associated POA  -patient had a temp of 102.4, tachycardia and seems lethargic in ER, Resolved now  -rodriguez cath was changed in the ER  -afebrile and no leukocytosis since admission  -on IV ceftriaxone, switch to po antibiotics with juan Q on discharge  -urine cx no significant growth on 6/17   Bladder outlet obstruction.    -Patient remains dependent on Rodriguez catheter.    -seen by urologist, follow up as an outpatient  BPH  -Continue Proscar and Flomax. Hypothyroidism.    -Continue levothyroxine at home dose of 50 mcg p.o. daily.   Dementia   -conscious and alert, oriented to self  -continue supportive care   -consult to PT/OT        Code status: Full Code  DVT prophylaxis: Lovenox    DISCHARGE DIAGNOSES / PLAN:       UTI indwelling rodriguez catheter associated POA  -improving  -continue Cefuroxime 250 mg bid for 5 more days with Igor Q  Bladder outlet obstruction.    -Patient remains dependent on Rodriguez catheter.    -outpatient follow up with Urologist   BPH  -Continue Proscar and Flomax. Hypothyroidism.    -Continue levothyroxine at home dose of 50 mcg p.o. daily. Dementia   -conscious and alert, oriented to self  -continue supportive care     PENDING TEST RESULTS:   At the time of discharge the following test results are still pending: None    FOLLOW UP APPOINTMENTS:    Follow-up Information     Follow up With Specialties Details [facility]   Follow-up on 7/8/21 at 4 PM with Dr. Margarita Hernandez at the [facility] for second opinion. Please call with questions [phone] [address] [place] [address] Emmie Adorno  [facility] [id number]    Primary Care Physician    In one week  Patient can only remember the practice name and not the physician          DIET: Regular Diet      ACTIVITY: Activity as tolerated, Fall precaution     WOUND CARE: None    EQUIPMENT needed: None      DISCHARGE MEDICATIONS:  Current Discharge Medication List      START taking these medications    Details   cefUROXime (CEFTIN) 250 mg tablet Take 1 Tablet by mouth two (2) times a day for 5 days. Qty: 10 Tablet, Refills: 0  Start date: 6/19/2021, End date: 6/24/2021      L.acidoph & parac-S.therm-Bifido (IGOR Q2/RISAQUAD-2) 16 billion cell cap cap Take 1 Capsule by mouth daily. Qty: 10 Capsule, Refills: 0  Start date: 6/19/2021         CONTINUE these medications which have NOT CHANGED    Details   galantamine (RAZADYNE) 16 mg SR capsule Take 16 mg by mouth Daily (before breakfast). CYANOCOBALAMIN, VITAMIN B-12, Dose unknown. CHOLECALCIFEROL, VITAMIN D3, PO Take 5,000 Units by mouth daily. ginkgo biloba (GINKGO PO) Take  by mouth. finasteride (PROSCAR) 5 mg tablet Take 1 Tab by mouth daily.   Qty: 30 Tab, Refills: 3      tamsulosin (FLOMAX) 0.4 mg capsule Take 1 Cap by mouth daily. Qty: 30 Cap, Refills: 3      levothyroxine (SYNTHROID) 75 mcg tablet Take 75 mcg by mouth Daily (before breakfast). sertraline (Zoloft) 50 mg tablet Take 50 mg by mouth daily. omega-3 fatty acids-vitamin e (FISH OIL) 1,000 mg cap Take 1 Cap by mouth daily. STOP taking these medications       OTHER Comments:   Reason for Stopping:         multivitamin (ONE A DAY) tablet Comments:   Reason for Stopping:                 NOTIFY YOUR PHYSICIAN FOR ANY OF THE FOLLOWING:   Fever over 101 degrees for 24 hours. Chest pain, shortness of breath, fever, chills, nausea, vomiting, diarrhea, change in mentation, falling, weakness, bleeding. Severe pain or pain not relieved by medications. Or, any other signs or symptoms that you may have questions about. DISPOSITION:  x  Home With:   OT  PT x HH  RN       Long term SNF/Inpatient Rehab    Independent/assisted living    Hospice    Other:       PATIENT CONDITION AT DISCHARGE:     Functional status    Poor    x Deconditioned     Independent      Cognition     Lucid     Forgetful    x Dementia      Catheters/lines (plus indication)   x Orta     PICC     PEG     None      Code status   x  Full code     DNR      PHYSICAL EXAMINATION AT DISCHARGE:  Patient Vitals for the past 24 hrs:   Temp Pulse Resp BP SpO2   06/19/21 0824 97.7 °F (36.5 °C) 96 16 (!) 141/92 93 %   06/19/21 0410 98.8 °F (37.1 °C) 89 16 97/64 94 %   06/18/21 2201 98.2 °F (36.8 °C) 89 16 132/73 98 %     General:          Alert, cooperative, no distress, appears stated age. HEENT:           Atraumatic, anicteric sclerae, pink conjunctivae                          No oral ulcers, mucosa moist, throat clear, dentition fair  Neck:               Supple, symmetrical  Lungs:             Clear to auscultation bilaterally. No Wheezing or Rhonchi. No rales. Chest wall:      No tenderness  No Accessory muscle use.   Heart:              Regular  rhythm,  No  murmur   No edema  Abdomen:        Soft, non-tender. Not distended. Bowel sounds normal  Extremities:     No cyanosis. No clubbing,                            Skin turgor normal, Capillary refill normal  Skin:                Not pale. Not Jaundiced  No rashes   Psych:             Not anxious or agitated.   Neurologic:      Alert, answer simple questions, moves all extremities,      Recent Results (from the past 24 hour(s))   CBC W/O DIFF    Collection Time: 06/19/21  4:21 AM   Result Value Ref Range    WBC 6.4 4.1 - 11.1 K/uL    RBC 4.06 (L) 4.10 - 5.70 M/uL    HGB 12.1 12.1 - 17.0 g/dL    HCT 36.2 (L) 36.6 - 50.3 %    MCV 89.2 80.0 - 99.0 FL    MCH 29.8 26.0 - 34.0 PG    MCHC 33.4 30.0 - 36.5 g/dL    RDW 12.8 11.5 - 14.5 %    PLATELET 590 302 - 045 K/uL    MPV 9.8 8.9 - 12.9 FL    NRBC 0.0 0  WBC    ABSOLUTE NRBC 0.00 0.00 - 3.94 K/uL   METABOLIC PANEL, BASIC    Collection Time: 06/19/21  4:21 AM   Result Value Ref Range    Sodium 139 136 - 145 mmol/L    Potassium 3.8 3.5 - 5.1 mmol/L    Chloride 109 (H) 97 - 108 mmol/L    CO2 22 21 - 32 mmol/L    Anion gap 8 5 - 15 mmol/L    Glucose 130 (H) 65 - 100 mg/dL    BUN 18 6 - 20 MG/DL    Creatinine 0.75 0.70 - 1.30 MG/DL    BUN/Creatinine ratio 24 (H) 12 - 20      GFR est AA >60 >60 ml/min/1.73m2    GFR est non-AA >60 >60 ml/min/1.73m2    Calcium 8.7 8.5 - 10.1 MG/DL     CHRONIC MEDICAL DIAGNOSES:  Problem List as of 6/19/2021 Never Reviewed        Codes Class Noted - Resolved    Urinary tract infection associated with indwelling urethral catheter ([facility] ICD-10-CM: T83.511A, N39.0  ICD-9-CM: 996.64, 599.0  6/18/2021 - Present        Abdominal pain ICD-10-CM: R10.9  ICD-9-CM: 789.00  4/25/2021 - Present        RESOLVED: Spinal stenosis of lumbar region with radiculopathy (Chronic) ICD-10-CM: M48.061, M54.16  ICD-9-CM: 724.02, 724.4  6/26/2013 - 6/28/2013              Greater than 45 minutes were spent with the patient on counseling and coordination of care    Signed: Suraj Francois MD  6/19/2021  1:51 PM

## 2021-06-19 NOTE — PROGRESS NOTES
1105:  RN notified MD that patient keeps trying to get out of bed and is pulling on rodriguez catheter and on IV lines. Will continue to monitor. 1204:  Bedside shift change report given to Nitish Palomo RN (oncoming nurse) by Stevie Bernheim, RN (offgoing nurse). Report included the following information SBAR, Kardex, Intake/Output, MAR and Recent Results.

## 2021-06-19 NOTE — PROGRESS NOTES
[facility] [facility] Adult  Hospitalist Group                                                                                          Hospitalist Progress Note  Jose Green MD  Answering service: [phone] from in house phone        Date of Service:  2021  NAME:  Candie Mclain  :  3/23/1933  MRN:  [id number]      Admission Summary:     Candie Mclain is a 80 y.o. male who is a history of dementia. During this examination, he is not verbalizing any answers to questions. Per record, patient was admitted on 2021 to [facility].  CT scan performed demonstrated significant bladder distention distention most likely related to chronic bladder outlet obstruction. Patient was started on finasteride and Flomax for benign prostate hyperplasia. Patient was seen by Urologist recommended that Rodriguez catheter remain in place until outpatient follow-up and voiding trial.  He was discharged on 2021.       Interval history / Subjective:     Patient poor historian, tried to get up out of bed this morning, sitter at bed side, calm on questions and answer simple questions      Assessment & Plan:     UTI indwelling rodriguez catheter associated POA  -patient had a temp of 102.4, tachycardia and seems lethargic in ER   -rodriguez cath was changed in the ER  -afebrile and no leukocytosis since admission  -on IV ceftriaxone  -urine cx no significant growth on , switch to po antibiotics with floraQ    Bladder outlet obstruction.    -Patient remains dependent on Rodriguez catheter.    -seen by urologist, follow up as an outpatient    BPH  -Continue Proscar, Flomax. Hypothyroidism.    -Continue levothyroxine at home dose of 50 mcg p.o. daily.     Dementia   -conscious and alert, oriented to selft  -continue supportive care   -consult to PT/OT      Code status: Full Code  DVT prophylaxis: Lovenox    Care Plan discussed with: Patient/Family, Nurse and   Anticipated Disposition: [facility] PT, OT, RN  Anticipated Discharge: 24 hours to 48 hours     Wife, Bella Avila, [phone] at bedside, questions answered  Son contact number [phone] [facility]  Never Reviewed        Codes Class Noted POA    Urinary tract infection associated with indwelling urethral catheter ([facility] ICD-10-CM: T83.511A, N39.0  ICD-9-CM: 996.64, 599.0  6/18/2021 Unknown              Vital Signs:    Last 24hrs VS reviewed since prior progress note. Most recent are:  Visit Vitals  BP (!) 141/92 (BP 1 Location: Left upper arm, BP Patient Position: At rest)   Pulse 96   Temp 97.7 °F (36.5 °C)   Resp 16   Ht 5' 10\" (1.778 m)   Wt 67.2 kg (148 lb 2.4 oz)   SpO2 93%   BMI 21.26 kg/m²         Intake/Output Summary (Last 24 hours) at 6/19/2021 1344  Last data filed at 6/19/2021 0410  Gross per 24 hour   Intake    Output 2100 ml   Net -2100 ml        Physical Examination:     I had a face to face encounter with this patient and independently examined them on 6/19/2021 as outlined below:          Constitutional:  No acute distress, cooperative, pleasant    ENT:  Oral mucosa moist, oropharynx benign. Resp:  CTA bilaterally. No wheezing/rhonchi/rales. No accessory muscle use   CV:  Regular rhythm, normal rate, no murmurs, gallops, rubs    GI:  Soft, non distended, non tender. normoactive bowel sounds, no hepatosplenomegaly     Musculoskeletal:  No edema,      Neurologic:  Conscious and alert, oriented to self, Moves all extremities.              Data Review:    Review and/or order of clinical lab test  Review and/or order of tests in the radiology section of CPT  Review and/or order of tests in the medicine section of CPT      Labs:     Recent Labs     06/19/21  0421 06/17/21  2306   WBC 6.4 7.4   HGB 12.1 11.9*   HCT 36.2* 36.1*    184     Recent Labs     06/19/21  0421 06/17/21  2306    139   K 3.8 3.9   * 108   CO2 22 25   BUN 18 33*   CREA 0.75 1.04   * 122*   CA 8.7 8.5     Recent Labs 06/17/21  2306   ALT 20   AP 86   TBILI 0.7   TP 6.1*   ALB 3.2*   GLOB 2.9     No results for input(s): INR, PTP, APTT, INREXT, INREXT in the last 72 hours. No results for input(s): FE, TIBC, PSAT, FERR in the last 72 hours. No results found for: FOL, RBCF   No results for input(s): PH, PCO2, PO2 in the last 72 hours. No results for input(s): CPK, CKNDX, TROIQ in the last 72 hours.     No lab exists for component: CPKMB  No results found for: CHOL, CHOLX, CHLST, CHOLV, HDL, HDLP, LDL, LDLC, DLDLP, TGLX, TRIGL, TRIGP, CHHD, CHHDX  No results found for: GLUCPOC  Lab Results   Component Value Date/Time    Color DARK YELLOW 06/17/2021 11:06 PM    Appearance TURBID (A) 06/17/2021 11:06 PM    Specific gravity 1.030 06/17/2021 11:06 PM    pH (UA) 5.5 06/17/2021 11:06 PM    Protein 30 (A) 06/17/2021 11:06 PM    Glucose Negative 06/17/2021 11:06 PM    Ketone TRACE (A) 06/17/2021 11:06 PM    Bilirubin Negative 06/17/2021 11:06 PM    Urobilinogen 1.0 06/17/2021 11:06 PM    Nitrites Negative 06/17/2021 11:06 PM    Leukocyte Esterase LARGE (A) 06/17/2021 11:06 PM    Epithelial cells FEW 06/17/2021 11:06 PM    Bacteria 2+ (A) 06/17/2021 11:06 PM    WBC 20-50 06/17/2021 11:06 PM    RBC 0-5 06/17/2021 11:06 PM         Medications Reviewed:     Current Facility-Administered Medications   Medication Dose Route Frequency    sodium chloride (NS) flush 5-40 mL  5-40 mL IntraVENous Q8H    sodium chloride (NS) flush 5-40 mL  5-40 mL IntraVENous PRN    acetaminophen (TYLENOL) tablet 650 mg  650 mg Oral Q6H PRN    Or    acetaminophen (TYLENOL) suppository 650 mg  650 mg Rectal Q6H PRN    polyethylene glycol (MIRALAX) packet 17 g  17 g Oral DAILY PRN    ondansetron (ZOFRAN ODT) tablet 4 mg  4 mg Oral Q8H PRN    Or    ondansetron (ZOFRAN) injection 4 mg  4 mg IntraVENous Q6H PRN    enoxaparin (LOVENOX) injection 40 mg  40 mg SubCUTAneous DAILY    cefTRIAXone (ROCEPHIN) 1 g in 0.9% sodium chloride (MBP/ADV) 50 mL MBP  1 g IntraVENous Q24H    finasteride (PROSCAR) tablet 5 mg  5 mg Oral DAILY    levothyroxine (SYNTHROID) tablet 75 mcg  75 mcg Oral ACB    tamsulosin (FLOMAX) capsule 0.4 mg  0.4 mg Oral DAILY    sertraline (ZOLOFT) tablet 50 mg  50 mg Oral DAILY    therapeutic multivitamin (THERAGRAN) tablet 1 Tablet  1 Tablet Oral DAILY    omega-3 acid ethyl esters (LOVAZA) capsule 1 Capsule  1 Capsule Oral DAILY    cyanocobalamin (VITAMIN B12) tablet 100 mcg  100 mcg Oral DAILY    galantamine (RAZADYNE) 16 mg  16 mg Oral ACB    cholecalciferol (VITAMIN D3) (1000 Units /25 mcg) tablet 5,000 Units  5,000 Units Oral DAILY     ______________________________________________________________________  EXPECTED LENGTH OF STAY: 2d 9h  ACTUAL LENGTH OF STAY:          1                 Akilah Reid MD

## 2021-06-23 LAB
BACTERIA SPEC CULT: NORMAL
SERVICE CMNT-IMP: NORMAL

## 2021-07-22 ENCOUNTER — HOSPITAL ENCOUNTER (EMERGENCY)
Age: 86
Discharge: NURSING, REHAB OR LONG-TERM CARE | End: 2021-07-23
Attending: EMERGENCY MEDICINE | Admitting: EMERGENCY MEDICINE
Payer: MEDICARE

## 2021-07-22 ENCOUNTER — APPOINTMENT (OUTPATIENT)
Dept: CT IMAGING | Age: 86
End: 2021-07-22
Attending: EMERGENCY MEDICINE
Payer: MEDICARE

## 2021-07-22 DIAGNOSIS — R41.82 ALTERED MENTAL STATUS, UNSPECIFIED ALTERED MENTAL STATUS TYPE: Primary | ICD-10-CM

## 2021-07-22 DIAGNOSIS — R46.89 AGGRESSION: ICD-10-CM

## 2021-07-22 DIAGNOSIS — F03.91 DEMENTIA WITH BEHAVIORAL DISTURBANCE, UNSPECIFIED DEMENTIA TYPE: ICD-10-CM

## 2021-07-22 LAB
ALBUMIN SERPL-MCNC: 3.6 G/DL (ref 3.5–5)
ALBUMIN/GLOB SERPL: 1.3 {RATIO} (ref 1.1–2.2)
ALP SERPL-CCNC: 72 U/L (ref 45–117)
ALT SERPL-CCNC: 33 U/L (ref 12–78)
AMMONIA PLAS-SCNC: 29 UMOL/L
ANION GAP SERPL CALC-SCNC: 4 MMOL/L (ref 5–15)
APAP SERPL-MCNC: <2 UG/ML (ref 10–30)
AST SERPL-CCNC: 25 U/L (ref 15–37)
BASOPHILS # BLD: 0 K/UL (ref 0–0.1)
BASOPHILS NFR BLD: 1 % (ref 0–1)
BILIRUB SERPL-MCNC: 1.1 MG/DL (ref 0.2–1)
BUN SERPL-MCNC: 34 MG/DL (ref 6–20)
BUN/CREAT SERPL: 41 (ref 12–20)
CALCIUM SERPL-MCNC: 9.3 MG/DL (ref 8.5–10.1)
CHLORIDE SERPL-SCNC: 109 MMOL/L (ref 97–108)
CO2 SERPL-SCNC: 27 MMOL/L (ref 21–32)
CREAT SERPL-MCNC: 0.83 MG/DL (ref 0.7–1.3)
DIFFERENTIAL METHOD BLD: ABNORMAL
EOSINOPHIL # BLD: 0.4 K/UL (ref 0–0.4)
EOSINOPHIL NFR BLD: 6 % (ref 0–7)
ERYTHROCYTE [DISTWIDTH] IN BLOOD BY AUTOMATED COUNT: 13.7 % (ref 11.5–14.5)
ETHANOL SERPL-MCNC: <10 MG/DL
GLOBULIN SER CALC-MCNC: 2.7 G/DL (ref 2–4)
GLUCOSE SERPL-MCNC: 157 MG/DL (ref 65–100)
HCT VFR BLD AUTO: 37 % (ref 36.6–50.3)
HGB BLD-MCNC: 12.3 G/DL (ref 12.1–17)
IMM GRANULOCYTES # BLD AUTO: 0 K/UL (ref 0–0.04)
IMM GRANULOCYTES NFR BLD AUTO: 0 % (ref 0–0.5)
LACTATE SERPL-SCNC: 1 MMOL/L (ref 0.4–2)
LYMPHOCYTES # BLD: 1.6 K/UL (ref 0.8–3.5)
LYMPHOCYTES NFR BLD: 24 % (ref 12–49)
MAGNESIUM SERPL-MCNC: 2.2 MG/DL (ref 1.6–2.4)
MCH RBC QN AUTO: 30.4 PG (ref 26–34)
MCHC RBC AUTO-ENTMCNC: 33.2 G/DL (ref 30–36.5)
MCV RBC AUTO: 91.4 FL (ref 80–99)
MONOCYTES # BLD: 0.2 K/UL (ref 0–1)
MONOCYTES NFR BLD: 3 % (ref 5–13)
NEUTS SEG # BLD: 4.2 K/UL (ref 1.8–8)
NEUTS SEG NFR BLD: 66 % (ref 32–75)
NRBC # BLD: 0 K/UL (ref 0–0.01)
NRBC BLD-RTO: 0 PER 100 WBC
PLATELET # BLD AUTO: 197 K/UL (ref 150–400)
PMV BLD AUTO: 10 FL (ref 8.9–12.9)
POTASSIUM SERPL-SCNC: 3.6 MMOL/L (ref 3.5–5.1)
PROT SERPL-MCNC: 6.3 G/DL (ref 6.4–8.2)
RBC # BLD AUTO: 4.05 M/UL (ref 4.1–5.7)
SALICYLATES SERPL-MCNC: <1.7 MG/DL (ref 2.8–20)
SODIUM SERPL-SCNC: 140 MMOL/L (ref 136–145)
TROPONIN I SERPL-MCNC: <0.05 NG/ML
WBC # BLD AUTO: 6.4 K/UL (ref 4.1–11.1)

## 2021-07-22 PROCEDURE — 84484 ASSAY OF TROPONIN QUANT: CPT

## 2021-07-22 PROCEDURE — 90791 PSYCH DIAGNOSTIC EVALUATION: CPT

## 2021-07-22 PROCEDURE — 82140 ASSAY OF AMMONIA: CPT

## 2021-07-22 PROCEDURE — 85025 COMPLETE CBC W/AUTO DIFF WBC: CPT

## 2021-07-22 PROCEDURE — 83735 ASSAY OF MAGNESIUM: CPT

## 2021-07-22 PROCEDURE — 74011000250 HC RX REV CODE- 250: Performed by: EMERGENCY MEDICINE

## 2021-07-22 PROCEDURE — 80143 DRUG ASSAY ACETAMINOPHEN: CPT

## 2021-07-22 PROCEDURE — 74011250636 HC RX REV CODE- 250/636: Performed by: EMERGENCY MEDICINE

## 2021-07-22 PROCEDURE — 87040 BLOOD CULTURE FOR BACTERIA: CPT

## 2021-07-22 PROCEDURE — 96372 THER/PROPH/DIAG INJ SC/IM: CPT

## 2021-07-22 PROCEDURE — 80179 DRUG ASSAY SALICYLATE: CPT

## 2021-07-22 PROCEDURE — 82077 ASSAY SPEC XCP UR&BREATH IA: CPT

## 2021-07-22 PROCEDURE — 99285 EMERGENCY DEPT VISIT HI MDM: CPT

## 2021-07-22 PROCEDURE — 36415 COLL VENOUS BLD VENIPUNCTURE: CPT

## 2021-07-22 PROCEDURE — 83605 ASSAY OF LACTIC ACID: CPT

## 2021-07-22 PROCEDURE — 80053 COMPREHEN METABOLIC PANEL: CPT

## 2021-07-22 PROCEDURE — 70450 CT HEAD/BRAIN W/O DYE: CPT

## 2021-07-22 PROCEDURE — 93005 ELECTROCARDIOGRAM TRACING: CPT

## 2021-07-22 RX ORDER — LORAZEPAM 2 MG/ML
2 INJECTION INTRAMUSCULAR
Status: COMPLETED | OUTPATIENT
Start: 2021-07-22 | End: 2021-07-22

## 2021-07-22 RX ADMIN — LORAZEPAM 2 MG: 2 INJECTION INTRAMUSCULAR; INTRAVENOUS at 20:48

## 2021-07-22 RX ADMIN — WATER 20 MG: 1 INJECTION INTRAMUSCULAR; INTRAVENOUS; SUBCUTANEOUS at 20:48

## 2021-07-22 NOTE — ED TRIAGE NOTES
Patient arrives to ED accompanied by son and daughter in law. Per son, patient from dementia facility, facility called family today and told them to get patient due to aggressive behavior. Patient requiring multiple staff members to get into ED room.   According to Dr. Yamileth Solorzano, medical TDO in process

## 2021-07-23 VITALS
SYSTOLIC BLOOD PRESSURE: 167 MMHG | DIASTOLIC BLOOD PRESSURE: 68 MMHG | TEMPERATURE: 97.5 F | HEART RATE: 75 BPM | RESPIRATION RATE: 18 BRPM | OXYGEN SATURATION: 99 %

## 2021-07-23 LAB
APPEARANCE UR: CLEAR
ATRIAL RATE: 80 BPM
BACTERIA URNS QL MICRO: NEGATIVE /HPF
BILIRUB UR QL: NEGATIVE
CALCULATED P AXIS, ECG09: 71 DEGREES
CALCULATED R AXIS, ECG10: 44 DEGREES
CALCULATED T AXIS, ECG11: 55 DEGREES
COLOR UR: ABNORMAL
DIAGNOSIS, 93000: NORMAL
EPITH CASTS URNS QL MICRO: ABNORMAL /LPF
GLUCOSE UR STRIP.AUTO-MCNC: NEGATIVE MG/DL
HGB UR QL STRIP: ABNORMAL
KETONES UR QL STRIP.AUTO: ABNORMAL MG/DL
LEUKOCYTE ESTERASE UR QL STRIP.AUTO: ABNORMAL
NITRITE UR QL STRIP.AUTO: NEGATIVE
P-R INTERVAL, ECG05: 172 MS
PH UR STRIP: 5.5 [PH] (ref 5–8)
PROT UR STRIP-MCNC: 30 MG/DL
Q-T INTERVAL, ECG07: 380 MS
QRS DURATION, ECG06: 82 MS
QTC CALCULATION (BEZET), ECG08: 438 MS
RBC #/AREA URNS HPF: ABNORMAL /HPF (ref 0–5)
SP GR UR REFRACTOMETRY: 1.02 (ref 1–1.03)
UA: UC IF INDICATED,UAUC: ABNORMAL
UROBILINOGEN UR QL STRIP.AUTO: 1 EU/DL (ref 0.2–1)
VENTRICULAR RATE, ECG03: 80 BPM
WBC URNS QL MICRO: ABNORMAL /HPF (ref 0–4)

## 2021-07-23 PROCEDURE — 74011250637 HC RX REV CODE- 250/637: Performed by: PSYCHIATRY & NEUROLOGY

## 2021-07-23 PROCEDURE — 74011250636 HC RX REV CODE- 250/636: Performed by: PSYCHIATRY & NEUROLOGY

## 2021-07-23 PROCEDURE — 74011250637 HC RX REV CODE- 250/637: Performed by: EMERGENCY MEDICINE

## 2021-07-23 PROCEDURE — 81001 URINALYSIS AUTO W/SCOPE: CPT

## 2021-07-23 RX ORDER — HALOPERIDOL 5 MG/ML
2.5 INJECTION INTRAMUSCULAR
Status: DISCONTINUED | OUTPATIENT
Start: 2021-07-23 | End: 2021-07-23 | Stop reason: HOSPADM

## 2021-07-23 RX ORDER — OLANZAPINE 5 MG/1
2.5 TABLET, ORALLY DISINTEGRATING ORAL 2 TIMES DAILY
Status: DISCONTINUED | OUTPATIENT
Start: 2021-07-23 | End: 2021-07-23 | Stop reason: HOSPADM

## 2021-07-23 RX ORDER — LORAZEPAM 2 MG/ML
0.5 INJECTION INTRAMUSCULAR
Status: DISCONTINUED | OUTPATIENT
Start: 2021-07-23 | End: 2021-07-23 | Stop reason: HOSPADM

## 2021-07-23 RX ORDER — ZIPRASIDONE HYDROCHLORIDE 20 MG/1
20 CAPSULE ORAL
Status: COMPLETED | OUTPATIENT
Start: 2021-07-23 | End: 2021-07-23

## 2021-07-23 RX ORDER — OLANZAPINE 5 MG/1
2.5 TABLET, ORALLY DISINTEGRATING ORAL 2 TIMES DAILY
Status: DISCONTINUED | OUTPATIENT
Start: 2021-07-23 | End: 2021-07-23

## 2021-07-23 RX ORDER — QUETIAPINE FUMARATE 25 MG/1
50 TABLET, FILM COATED ORAL 3 TIMES DAILY
Status: DISCONTINUED | OUTPATIENT
Start: 2021-07-23 | End: 2021-07-23

## 2021-07-23 RX ORDER — LORAZEPAM 0.5 MG/1
0.5 TABLET ORAL
Status: DISCONTINUED | OUTPATIENT
Start: 2021-07-23 | End: 2021-07-23 | Stop reason: HOSPADM

## 2021-07-23 RX ADMIN — OLANZAPINE 2.5 MG: 5 TABLET, ORALLY DISINTEGRATING ORAL at 13:10

## 2021-07-23 RX ADMIN — LORAZEPAM 0.5 MG: 0.5 TABLET ORAL at 17:15

## 2021-07-23 RX ADMIN — LORAZEPAM 0.5 MG: 2 INJECTION INTRAMUSCULAR; INTRAVENOUS at 14:30

## 2021-07-23 RX ADMIN — HALOPERIDOL LACTATE 2.5 MG: 5 INJECTION, SOLUTION INTRAMUSCULAR at 13:44

## 2021-07-23 RX ADMIN — ZIPRASIDONE HYDROCHLORIDE 20 MG: 20 CAPSULE ORAL at 01:46

## 2021-07-23 NOTE — ED NOTES
Bedside and Verbal shift change report given to Abimbola Sifuentes RN (oncoming nurse) by Paris Richardson RN (offgoing nurse). Report included the following information SBAR, ED Summary, MAR and Recent Results.

## 2021-07-23 NOTE — PROGRESS NOTES
Call placed to Roxann Horta 13 of transport for 1600 and to inquire about her arrival to ED. Sera Cabrera states son is at bedside and will drive to back to facility. Stalin Selby will need to complete assessment prior to patient placement at [facility]. SERGIO Pelletier at this time is unable to give time for her arrival to the ED. CM and Nelli Avila met w/ Christiano Dunham - introduced to roles. Additional history obtained. Patient and spouse moved into [facility] April 2021 previously resided w/ Diogenes Teresa and his wife (1.5 yrs). Son acknowledges change in behaviors 1 month ago and patient has had UTIs , indwelling catheter and prostate surgery needed. Patient is a full code and son acknowledges needs to be changed. Sidra Villalpando patient's son en route to the ED (lives outside of [place]). CM acknowledged the need for patient to have the appropriate medications aboard to decrease current symptoms and acceptance to memory care unit at [facility]. Son was unaware hospice may be an option for supportive care. CM informed facility may have hospices partnered w/ and can discuss as supportive care measure for patient. SSED Department available to assist w/ transition back to [facility] today. [facility] Medicare A/B and MMIS are insurance providers. AMD on file. Care Management Interventions  PCP Verified by CM:  Yes  Palliative Care Criteria Met (RRAT>21 & CHF Dx)?: No  Transition of Care Consult (CM Consult): Discharge Planning  MyChart Signup: No  Discharge Durable Medical Equipment: No  Health Maintenance Reviewed: Yes  Current Support Network: [address] Provided?: No  Discharge Location  Discharge Placement:  (TBD)

## 2021-07-23 NOTE — BSMART NOTE
This writer called [facility] [employer] and spoke with [address] [place] [address] who reported patient is not an ECO. Krystal from [facility] and was advised that patient was placed under a medical alf on 7/22/21 at 7:21pm.  Advised Dr Lalitha Hanna who is currently handling case. He involved Senior Services, SERGIO Aiken, who has spoken with his facility and the family and facility are working on arrangements to place patient back there in memory care. It was thought that he was in memory care prior to arrival and that was not the case. Psychiatric consult has also been placed.
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
## 2021-07-23 NOTE — ED NOTES
Patient transported by [facility] back to [facility]. Unable to repeat vital signs prior to departure.

## 2021-07-23 NOTE — ED NOTES
Patient family member reporting patient is becoming increasingly agitated and kicking. RN to bedside to medicate patient.
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
## 2021-07-23 NOTE — PROGRESS NOTES
1605 Call received from [employer]/ [employer] crew traveling from [address] Nursing informed of new ETA.

## 2021-07-23 NOTE — ED NOTES
7:48 AM  Change of shift. Care of patient signed over to Kvng Mcclelland. Bedside handoff complete. Awaiting psychiatry consultation for medication adjustment and case management placement.

## 2021-07-23 NOTE — PROGRESS NOTES
SSED/CM consult received and appreciated. EMR reviewed. Patient w/ advanced dementia and is a resident at [facility] [address] [place] [phone]. Patient resides w/ his spouse. Updates received from [address] [place], [place] [address]jose Barrera son meeting w/ facility administration regarding memory care. Cristiana Mcfarlane NP to evaluate patient this afternoon in the ED. CM placed call to [facility] for transport 1600 - spoke w/ Mally, Dispatcher [phone] is available. CM will follow for transitions of care needs.

## 2021-07-23 NOTE — ED NOTES
12:05 AM  Change of shift. Care of patient taken over from Liannacitlali Wilson; H&P reviewed, bedside handoff complete. Awaiting case management placement.

## 2021-07-23 NOTE — ED NOTES
Patient resting in bed with sitter at bedside. No acute distress noted at this time. RN will continue to monitor.

## 2021-07-23 NOTE — ED NOTES
Pt son and sitter at bedside. Patient given tray and ate part of scrambled eggs and 1.5 ensures. RN will continue to monitor.

## 2021-07-23 NOTE — [facility] NOTE
[facility] notified of pt's consult for IP admission. Pt placed under an ECO and will need to be assessed for a TDO by [facility]. Writer notified ED [facility] will need to be notified pt is in need of a TDO evaluation: [facility]: [phone].

## 2021-07-23 NOTE — PROGRESS NOTES
Hospitalist service consulted for admission of 27-year-old gentleman with known dementia with aggressive/psychotic features. Per history and chart review, this has been an ongoing problem for which he is current assistant care living facility has deemed him not a candidate for continued stay. He is brought to ER for evaluation as last resort. Patient seen and examined. Chart reviewed. No medical for indication is noted at this time. Recommend psych consult for appropriate treatment of aggression in case management consult for placement.

## 2021-07-24 NOTE — CONSULTS
This provider was paged for a consult at [phone] on 7/23/21. Attempted to consult on pt today, pt has been discharged.

## 2021-07-26 ENCOUNTER — HOSPITAL ENCOUNTER (EMERGENCY)
Age: 86
Discharge: HOME | DRG: 666 | End: 2021-07-26
Attending: EMERGENCY MEDICINE
Payer: MEDICARE

## 2021-07-26 VITALS
RESPIRATION RATE: 16 BRPM | SYSTOLIC BLOOD PRESSURE: 114 MMHG | BODY MASS INDEX: 19.23 KG/M2 | WEIGHT: 134.04 LBS | DIASTOLIC BLOOD PRESSURE: 79 MMHG | HEART RATE: 80 BPM | OXYGEN SATURATION: 97 % | TEMPERATURE: 96.9 F

## 2021-07-26 DIAGNOSIS — T83.511A URINARY TRACT INFECTION ASSOCIATED WITH INDWELLING URETHRAL CATHETER, INITIAL ENCOUNTER (HCC): ICD-10-CM

## 2021-07-26 DIAGNOSIS — T83.9XXA FOLEY CATHETER PROBLEM, INITIAL ENCOUNTER (HCC): Primary | ICD-10-CM

## 2021-07-26 DIAGNOSIS — N39.0 URINARY TRACT INFECTION ASSOCIATED WITH INDWELLING URETHRAL CATHETER, INITIAL ENCOUNTER (HCC): ICD-10-CM

## 2021-07-26 LAB
APPEARANCE UR: ABNORMAL
BACTERIA URNS QL MICRO: ABNORMAL /HPF
BILIRUB UR QL CFM: NEGATIVE
COLOR UR: ABNORMAL
EPITH CASTS URNS QL MICRO: ABNORMAL /LPF
GLUCOSE UR STRIP.AUTO-MCNC: NEGATIVE MG/DL
GRAN CASTS URNS QL MICRO: ABNORMAL /LPF
HGB UR QL STRIP: ABNORMAL
HYALINE CASTS URNS QL MICRO: ABNORMAL /LPF (ref 0–5)
KETONES UR QL STRIP.AUTO: 15 MG/DL
LEUKOCYTE ESTERASE UR QL STRIP.AUTO: ABNORMAL
MUCOUS THREADS URNS QL MICRO: ABNORMAL /LPF
NITRITE UR QL STRIP.AUTO: NEGATIVE
PH UR STRIP: 5 [PH] (ref 5–8)
PROT UR STRIP-MCNC: >300 MG/DL
RBC #/AREA URNS HPF: ABNORMAL /HPF (ref 0–5)
SP GR UR REFRACTOMETRY: >1.03 (ref 1–1.03)
UR CULT HOLD, URHOLD: NORMAL
UROBILINOGEN UR QL STRIP.AUTO: 1 EU/DL (ref 0.2–1)
WBC URNS QL MICRO: ABNORMAL /HPF (ref 0–4)

## 2021-07-26 PROCEDURE — 87086 URINE CULTURE/COLONY COUNT: CPT

## 2021-07-26 PROCEDURE — 87186 SC STD MICRODIL/AGAR DIL: CPT

## 2021-07-26 PROCEDURE — 87077 CULTURE AEROBIC IDENTIFY: CPT

## 2021-07-26 PROCEDURE — 99284 EMERGENCY DEPT VISIT MOD MDM: CPT

## 2021-07-26 PROCEDURE — 81001 URINALYSIS AUTO W/SCOPE: CPT

## 2021-07-26 RX ORDER — CEPHALEXIN 250 MG/5ML
500 POWDER, FOR SUSPENSION ORAL EVERY 8 HOURS
Qty: 200 ML | Refills: 0 | Status: ON HOLD | OUTPATIENT
Start: 2021-07-26 | End: 2021-07-29

## 2021-07-26 RX ORDER — CEPHALEXIN 250 MG/1
500 CAPSULE ORAL
Status: DISCONTINUED | OUTPATIENT
Start: 2021-07-26 | End: 2021-07-26

## 2021-07-26 RX ORDER — CEPHALEXIN 500 MG/1
500 CAPSULE ORAL 3 TIMES DAILY
Qty: 21 CAPSULE | Refills: 0 | Status: SHIPPED | OUTPATIENT
Start: 2021-07-26 | End: 2021-07-26 | Stop reason: ALTCHOICE

## 2021-07-26 NOTE — ED PROVIDER NOTES
Juli Agee is a 80y M with h/o dementia and chronic indwelling rodriguez. He is a resident at [facility]. Staff reported that he pulled out his rodriguez and was too agitated for staff to replace the rodriguez. They gave him PO ativan prior to calling EMS to transport patient to the ED for rodriguez replacment. Past Medical History:   Diagnosis Date    Arthritis     Asthma     Benign prostate hyperplasia     Dementia ([facility] [address]     Hypothyroidism     Spinal stenosis of lumbar region with radiculopathy 6/26/2013       Past Surgical History:   Procedure Laterality Date    HX OTHER SURGICAL      cyst removed from back    HX TONSIL AND ADENOIDECTOMY           No family history on file. Social History     Socioeconomic History    Marital status:      Spouse name: Not on file    Number of children: Not on file    Years of education: Not on file    Highest education level: Not on file   Occupational History    Not on file   Tobacco Use    Smoking status: Never Smoker    Smokeless tobacco: Never Used   Substance and Sexual Activity    Alcohol use: Yes     Comment: occ.  Drug use: Never    Sexual activity: Not on file   Other Topics Concern     Service Not Asked    Blood Transfusions Not Asked    Caffeine Concern Not Asked    Occupational Exposure Not Asked    Hobby Hazards Not Asked    Sleep Concern Not Asked    Stress Concern Not Asked    Weight Concern Not Asked    Special Diet Not Asked    Back Care Not Asked    Exercise Not Asked    Bike Helmet Not Asked   [address] Not Asked    Self-Exams Not Asked   Social History Narrative    Not on file     Social Determinants of Health     Financial Resource Strain:     Difficulty of Paying Living Expenses:    Food Insecurity:     Worried About Running Out of Food in the Last Year:     [address] in the Last Year:    Transportation Needs:     Lack of Transportation (Medical):      Lack of Transportation (Non-Medical): Physical Activity:     Days of Exercise per Week:     Minutes of Exercise per Session:    Stress:     Feeling of Stress :    Social Connections:     Frequency of Communication with Friends and Family:     Frequency of Social Gatherings with Friends and Family:     Attends Latter-day Services:     Active Member of Clubs or Organizations:     Attends Club or Organization Meetings:     Marital Status:    Intimate Partner Violence:     Fear of Current or Ex-Partner:     Emotionally Abused:     Physically Abused:     Sexually Abused: ALLERGIES: Patient has no known allergies. Review of Systems   Unable to perform ROS: Dementia   Constitutional: Negative for fever. HENT: Negative for sore throat. Eyes: Negative for visual disturbance. Respiratory: Negative for cough. Cardiovascular: Negative for chest pain. Gastrointestinal: Negative for abdominal pain. Genitourinary: Negative for dysuria. Musculoskeletal: Negative for back pain. Skin: Negative for rash. Neurological: Negative for headaches. There were no vitals filed for this visit. Physical Exam  Vitals and nursing note reviewed. Constitutional:       General: He is sleeping. He is not in acute distress. Appearance: He is well-developed. HENT:      Head: Normocephalic and atraumatic. Eyes:      Conjunctiva/sclera: Conjunctivae normal.   Neck:      Trachea: Phonation normal.   Cardiovascular:      Rate and Rhythm: Normal rate. Pulmonary:      Effort: Pulmonary effort is normal. No respiratory distress. Abdominal:      General: There is no distension. Tenderness: There is no abdominal tenderness. There is no guarding or rebound. Musculoskeletal:         General: No tenderness. Normal range of motion. Cervical back: Normal range of motion. Skin:     General: Skin is warm and dry. Neurological:      Mental Status: He is not disoriented. Motor: No abnormal muscle tone. MDM     Orta displacement. Orta replaced,  Urine c/w UA. Will treat with keflex.     Procedures

## 2021-07-26 NOTE — ED TRIAGE NOTES
Patient arrives by EMS from [facility] with c/o pulling his rodriguez out about 1 hour ago. Per EMS patient was too combative to get rodriguez back in. Patient was given ativan prior to arrival at nursing facility.

## 2021-07-27 LAB
BACTERIA SPEC CULT: NORMAL
SERVICE CMNT-IMP: NORMAL

## 2021-07-28 ENCOUNTER — APPOINTMENT (OUTPATIENT)
Dept: GENERAL RADIOLOGY | Age: 86
DRG: 666 | End: 2021-07-28
Attending: EMERGENCY MEDICINE
Payer: MEDICARE

## 2021-07-28 ENCOUNTER — HOSPITAL ENCOUNTER (INPATIENT)
Age: 86
LOS: 16 days | Discharge: HOSPICE | DRG: 666 | End: 2021-08-13
Attending: EMERGENCY MEDICINE | Admitting: INTERNAL MEDICINE
Payer: MEDICARE

## 2021-07-28 DIAGNOSIS — N39.0 URINARY TRACT INFECTION WITHOUT HEMATURIA, SITE UNSPECIFIED: Primary | ICD-10-CM

## 2021-07-28 DIAGNOSIS — Z66 DO NOT RESUSCITATE: ICD-10-CM

## 2021-07-28 DIAGNOSIS — Z71.89 GOALS OF CARE, COUNSELING/DISCUSSION: ICD-10-CM

## 2021-07-28 DIAGNOSIS — Z51.5 HOSPICE CARE: ICD-10-CM

## 2021-07-28 DIAGNOSIS — F03.C0 SEVERE DEMENTIA: ICD-10-CM

## 2021-07-28 DIAGNOSIS — R41.0 DELIRIUM: ICD-10-CM

## 2021-07-28 PROBLEM — E87.20 LACTIC ACIDOSIS: Status: ACTIVE | Noted: 2021-07-28

## 2021-07-28 LAB
ALBUMIN SERPL-MCNC: 4.4 G/DL (ref 3.5–5)
ALBUMIN/GLOB SERPL: 1.3 {RATIO} (ref 1.1–2.2)
ALP SERPL-CCNC: 89 U/L (ref 45–117)
ALT SERPL-CCNC: 31 U/L (ref 12–78)
AMPHET UR QL SCN: NEGATIVE
ANION GAP SERPL CALC-SCNC: 13 MMOL/L (ref 5–15)
APAP SERPL-MCNC: <2 UG/ML (ref 10–30)
APPEARANCE UR: ABNORMAL
AST SERPL-CCNC: 34 U/L (ref 15–37)
ATRIAL RATE: 122 BPM
BACTERIA SPEC CULT: ABNORMAL
BACTERIA URNS QL MICRO: NEGATIVE /HPF
BARBITURATES UR QL SCN: NEGATIVE
BASOPHILS # BLD: 0.1 K/UL (ref 0–0.1)
BASOPHILS NFR BLD: 1 % (ref 0–1)
BENZODIAZ UR QL: NEGATIVE
BILIRUB SERPL-MCNC: 1.5 MG/DL (ref 0.2–1)
BILIRUB UR QL: NEGATIVE
BUN SERPL-MCNC: 41 MG/DL (ref 6–20)
BUN/CREAT SERPL: 33 (ref 12–20)
CALCIUM SERPL-MCNC: 9.9 MG/DL (ref 8.5–10.1)
CALCULATED P AXIS, ECG09: 81 DEGREES
CALCULATED R AXIS, ECG10: 71 DEGREES
CALCULATED T AXIS, ECG11: 78 DEGREES
CANNABINOIDS UR QL SCN: NEGATIVE
CAOX CRY URNS QL MICRO: ABNORMAL
CC UR VC: ABNORMAL
CHLORIDE SERPL-SCNC: 106 MMOL/L (ref 97–108)
CO2 SERPL-SCNC: 23 MMOL/L (ref 21–32)
COCAINE UR QL SCN: NEGATIVE
COLOR UR: ABNORMAL
COMMENT, HOLDF: NORMAL
CREAT SERPL-MCNC: 1.26 MG/DL (ref 0.7–1.3)
DIAGNOSIS, 93000: NORMAL
DIFFERENTIAL METHOD BLD: ABNORMAL
DRUG SCRN COMMENT,DRGCM: NORMAL
EOSINOPHIL # BLD: 0.2 K/UL (ref 0–0.4)
EOSINOPHIL NFR BLD: 2 % (ref 0–7)
EPITH CASTS URNS QL MICRO: ABNORMAL /LPF
ERYTHROCYTE [DISTWIDTH] IN BLOOD BY AUTOMATED COUNT: 13.3 % (ref 11.5–14.5)
ETHANOL SERPL-MCNC: <10 MG/DL
GLOBULIN SER CALC-MCNC: 3.4 G/DL (ref 2–4)
GLUCOSE SERPL-MCNC: 111 MG/DL (ref 65–100)
GLUCOSE UR STRIP.AUTO-MCNC: NEGATIVE MG/DL
HCT VFR BLD AUTO: 42.7 % (ref 36.6–50.3)
HGB BLD-MCNC: 14.2 G/DL (ref 12.1–17)
HGB UR QL STRIP: ABNORMAL
HYALINE CASTS URNS QL MICRO: ABNORMAL /LPF (ref 0–5)
IMM GRANULOCYTES # BLD AUTO: 0 K/UL (ref 0–0.04)
IMM GRANULOCYTES NFR BLD AUTO: 0 % (ref 0–0.5)
KETONES UR QL STRIP.AUTO: >80 MG/DL
LACTATE SERPL-SCNC: 1.5 MMOL/L (ref 0.4–2)
LACTATE SERPL-SCNC: 4.2 MMOL/L (ref 0.4–2)
LEUKOCYTE ESTERASE UR QL STRIP.AUTO: ABNORMAL
LYMPHOCYTES # BLD: 2.3 K/UL (ref 0.8–3.5)
LYMPHOCYTES NFR BLD: 23 % (ref 12–49)
MCH RBC QN AUTO: 30.1 PG (ref 26–34)
MCHC RBC AUTO-ENTMCNC: 33.3 G/DL (ref 30–36.5)
MCV RBC AUTO: 90.5 FL (ref 80–99)
METHADONE UR QL: NEGATIVE
MONOCYTES # BLD: 0.4 K/UL (ref 0–1)
MONOCYTES NFR BLD: 4 % (ref 5–13)
NEUTS SEG # BLD: 6.9 K/UL (ref 1.8–8)
NEUTS SEG NFR BLD: 70 % (ref 32–75)
NITRITE UR QL STRIP.AUTO: NEGATIVE
NRBC # BLD: 0 K/UL (ref 0–0.01)
NRBC BLD-RTO: 0 PER 100 WBC
OPIATES UR QL: NEGATIVE
P-R INTERVAL, ECG05: 162 MS
PCP UR QL: NEGATIVE
PH UR STRIP: 5 [PH] (ref 5–8)
PLATELET # BLD AUTO: 283 K/UL (ref 150–400)
PMV BLD AUTO: 10.2 FL (ref 8.9–12.9)
POTASSIUM SERPL-SCNC: 4.2 MMOL/L (ref 3.5–5.1)
PROT SERPL-MCNC: 7.8 G/DL (ref 6.4–8.2)
PROT UR STRIP-MCNC: 100 MG/DL
Q-T INTERVAL, ECG07: 312 MS
QRS DURATION, ECG06: 68 MS
QTC CALCULATION (BEZET), ECG08: 444 MS
RBC # BLD AUTO: 4.72 M/UL (ref 4.1–5.7)
RBC #/AREA URNS HPF: ABNORMAL /HPF (ref 0–5)
SALICYLATES SERPL-MCNC: <1.7 MG/DL (ref 2.8–20)
SAMPLES BEING HELD,HOLD: NORMAL
SERVICE CMNT-IMP: ABNORMAL
SODIUM SERPL-SCNC: 142 MMOL/L (ref 136–145)
SP GR UR REFRACTOMETRY: 1.02 (ref 1–1.03)
UROBILINOGEN UR QL STRIP.AUTO: 0.2 EU/DL (ref 0.2–1)
VENTRICULAR RATE, ECG03: 122 BPM
WBC # BLD AUTO: 9.8 K/UL (ref 4.1–11.1)
WBC URNS QL MICRO: ABNORMAL /HPF (ref 0–4)

## 2021-07-28 PROCEDURE — 82077 ASSAY SPEC XCP UR&BREATH IA: CPT

## 2021-07-28 PROCEDURE — 80307 DRUG TEST PRSMV CHEM ANLYZR: CPT

## 2021-07-28 PROCEDURE — 74011250636 HC RX REV CODE- 250/636: Performed by: INTERNAL MEDICINE

## 2021-07-28 PROCEDURE — 80053 COMPREHEN METABOLIC PANEL: CPT

## 2021-07-28 PROCEDURE — 65660000000 HC RM CCU STEPDOWN

## 2021-07-28 PROCEDURE — 87086 URINE CULTURE/COLONY COUNT: CPT

## 2021-07-28 PROCEDURE — 93005 ELECTROCARDIOGRAM TRACING: CPT

## 2021-07-28 PROCEDURE — 96374 THER/PROPH/DIAG INJ IV PUSH: CPT

## 2021-07-28 PROCEDURE — 74011250636 HC RX REV CODE- 250/636: Performed by: EMERGENCY MEDICINE

## 2021-07-28 PROCEDURE — 85025 COMPLETE CBC W/AUTO DIFF WBC: CPT

## 2021-07-28 PROCEDURE — 87040 BLOOD CULTURE FOR BACTERIA: CPT

## 2021-07-28 PROCEDURE — 36415 COLL VENOUS BLD VENIPUNCTURE: CPT

## 2021-07-28 PROCEDURE — 74011000250 HC RX REV CODE- 250: Performed by: EMERGENCY MEDICINE

## 2021-07-28 PROCEDURE — 74011000258 HC RX REV CODE- 258: Performed by: INTERNAL MEDICINE

## 2021-07-28 PROCEDURE — 80179 DRUG ASSAY SALICYLATE: CPT

## 2021-07-28 PROCEDURE — 74011000250 HC RX REV CODE- 250: Performed by: INTERNAL MEDICINE

## 2021-07-28 PROCEDURE — 83605 ASSAY OF LACTIC ACID: CPT

## 2021-07-28 PROCEDURE — 99285 EMERGENCY DEPT VISIT HI MDM: CPT

## 2021-07-28 PROCEDURE — 80143 DRUG ASSAY ACETAMINOPHEN: CPT

## 2021-07-28 PROCEDURE — 96372 THER/PROPH/DIAG INJ SC/IM: CPT

## 2021-07-28 PROCEDURE — 71045 X-RAY EXAM CHEST 1 VIEW: CPT

## 2021-07-28 PROCEDURE — 81001 URINALYSIS AUTO W/SCOPE: CPT

## 2021-07-28 RX ORDER — ZIPRASIDONE MESYLATE 20 MG/ML
INJECTION, POWDER, LYOPHILIZED, FOR SOLUTION INTRAMUSCULAR
Status: DISPENSED
Start: 2021-07-28 | End: 2021-07-29

## 2021-07-28 RX ORDER — SERTRALINE HYDROCHLORIDE 50 MG/1
25 TABLET, FILM COATED ORAL DAILY
Status: DISCONTINUED | OUTPATIENT
Start: 2021-07-29 | End: 2021-08-13 | Stop reason: HOSPADM

## 2021-07-28 RX ORDER — FACIAL-BODY WIPES
10 EACH TOPICAL DAILY PRN
Status: DISCONTINUED | OUTPATIENT
Start: 2021-07-28 | End: 2021-08-13 | Stop reason: HOSPADM

## 2021-07-28 RX ORDER — WATER FOR INJECTION,STERILE
VIAL (ML) INJECTION
Status: DISPENSED
Start: 2021-07-28 | End: 2021-07-29

## 2021-07-28 RX ORDER — LORAZEPAM 2 MG/ML
INJECTION INTRAMUSCULAR
Status: DISCONTINUED
Start: 2021-07-28 | End: 2021-07-28

## 2021-07-28 RX ORDER — VANCOMYCIN HYDROCHLORIDE
1250
Status: COMPLETED | OUTPATIENT
Start: 2021-07-28 | End: 2021-07-28

## 2021-07-28 RX ORDER — ACETAMINOPHEN 650 MG/1
650 SUPPOSITORY RECTAL
Status: DISCONTINUED | OUTPATIENT
Start: 2021-07-28 | End: 2021-08-13 | Stop reason: HOSPADM

## 2021-07-28 RX ORDER — FINASTERIDE 5 MG/1
5 TABLET, FILM COATED ORAL DAILY
Status: DISCONTINUED | OUTPATIENT
Start: 2021-07-29 | End: 2021-08-13 | Stop reason: HOSPADM

## 2021-07-28 RX ORDER — SODIUM CHLORIDE 9 MG/ML
100 INJECTION, SOLUTION INTRAVENOUS CONTINUOUS
Status: DISCONTINUED | OUTPATIENT
Start: 2021-07-28 | End: 2021-07-29

## 2021-07-28 RX ORDER — LORAZEPAM 2 MG/ML
0.5 INJECTION INTRAMUSCULAR
Status: COMPLETED | OUTPATIENT
Start: 2021-07-28 | End: 2021-07-28

## 2021-07-28 RX ORDER — SODIUM CHLORIDE 0.9 % (FLUSH) 0.9 %
5-40 SYRINGE (ML) INJECTION AS NEEDED
Status: DISCONTINUED | OUTPATIENT
Start: 2021-07-28 | End: 2021-08-13 | Stop reason: HOSPADM

## 2021-07-28 RX ORDER — ACETAMINOPHEN 325 MG/1
650 TABLET ORAL
Status: DISCONTINUED | OUTPATIENT
Start: 2021-07-28 | End: 2021-08-13 | Stop reason: HOSPADM

## 2021-07-28 RX ORDER — TAMSULOSIN HYDROCHLORIDE 0.4 MG/1
0.4 CAPSULE ORAL DAILY
Status: DISCONTINUED | OUTPATIENT
Start: 2021-07-29 | End: 2021-08-13 | Stop reason: HOSPADM

## 2021-07-28 RX ORDER — LEVOTHYROXINE SODIUM 75 UG/1
75 TABLET ORAL
Status: DISCONTINUED | OUTPATIENT
Start: 2021-07-29 | End: 2021-07-29

## 2021-07-28 RX ORDER — ENOXAPARIN SODIUM 100 MG/ML
40 INJECTION SUBCUTANEOUS DAILY
Status: DISCONTINUED | OUTPATIENT
Start: 2021-07-29 | End: 2021-08-13 | Stop reason: HOSPADM

## 2021-07-28 RX ADMIN — SODIUM CHLORIDE 100 ML/HR: 9 INJECTION, SOLUTION INTRAVENOUS at 22:43

## 2021-07-28 RX ADMIN — SODIUM CHLORIDE 1000 ML: 9 INJECTION, SOLUTION INTRAVENOUS at 16:10

## 2021-07-28 RX ADMIN — VANCOMYCIN HYDROCHLORIDE 1250 MG: 10 INJECTION, POWDER, LYOPHILIZED, FOR SOLUTION INTRAVENOUS at 20:46

## 2021-07-28 RX ADMIN — LORAZEPAM 0.5 MG: 2 INJECTION INTRAMUSCULAR; INTRAVENOUS at 19:02

## 2021-07-28 RX ADMIN — WATER 20 MG: 1 INJECTION INTRAMUSCULAR; INTRAVENOUS; SUBCUTANEOUS at 14:44

## 2021-07-28 RX ADMIN — ZIPRASIDONE MESYLATE 10 MG: 20 INJECTION, POWDER, LYOPHILIZED, FOR SOLUTION INTRAMUSCULAR at 20:59

## 2021-07-28 RX ADMIN — CEFTRIAXONE SODIUM 1 G: 1 INJECTION, POWDER, FOR SOLUTION INTRAMUSCULAR; INTRAVENOUS at 23:39
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
## 2021-07-28 NOTE — BSMART NOTE
Discussed case with Brooke Bell from [address] [place] [address]. Patient is medicated due to aggressive behavior and has Dementia diagnosis. He was recently herein ER from 7/22/21 to 7/26/21 and discharged to memory care unit at [facility]. Patient does not appear to have capacity for voluntary treatment and Brooke Bell is going to make face to face contact with patient to evaluate further. Brooke Bell attempted contact and patient has been medicated and he was minimally responsive. She indicated [facility] can call once he is medically cleared.

## 2021-07-28 NOTE — ED PROVIDER NOTES
This is an 80-year-old male who was transferred from a memory care unit. He was being transferred to the psychiatric unit down in [place] but they required medical evaluation and mahnaz smart evaluation prior to transfer. Patient was therefore sent here for further evaluation medically and by psych therapy. There is no reported fever or chill. Patient has just become more aggressive. No vomiting or diarrhea is reported. Patient has been getting his medication as has been ordered according to the home. No other acute concerns are noted. The family members provided some additional history and said that patient is supposed to be having a TURP for some recurrent urinary tract infections. When he gets infected he begins to act something similar to this. He has a Orta catheter. Patient had been given some Seroquel and Haldol but they could not get him settled back down at home. He was sent here for medical clearance and then evaluation by be smart prior to planned transfer to a different facility. Past Medical History:   Diagnosis Date    Arthritis     Asthma     Benign prostate hyperplasia     Dementia ([facility] [address]     Hypothyroidism     Spinal stenosis of lumbar region with radiculopathy 6/26/2013       Past Surgical History:   Procedure Laterality Date    HX OTHER SURGICAL      cyst removed from back    HX TONSIL AND ADENOIDECTOMY           History reviewed. No pertinent family history. Social History     Socioeconomic History    Marital status:      Spouse name: Not on file    Number of children: Not on file    Years of education: Not on file    Highest education level: Not on file   Occupational History    Not on file   Tobacco Use    Smoking status: Never Smoker    Smokeless tobacco: Never Used   Substance and Sexual Activity    Alcohol use: Yes     Comment: occ.     Drug use: Never    Sexual activity: Not Currently   Other Topics Concern     Service Not Asked    Blood Transfusions Not Asked    Caffeine Concern Not Asked    Occupational Exposure Not Asked    Hobby Hazards Not Asked    Sleep Concern Not Asked    Stress Concern Not Asked    Weight Concern Not Asked    Special Diet Not Asked    Back Care Not Asked    Exercise Not Asked    Bike Helmet Not Asked   [address] Not Asked    Self-Exams Not Asked   Social History Narrative    Not on file     Social Determinants of Health     Financial Resource Strain:     Difficulty of Paying Living Expenses:    Food Insecurity:     Worried About Running Out of Food in the Last Year:     [address] in the Last Year:    Transportation Needs:     Lack of Transportation (Medical):  Lack of Transportation (Non-Medical):    Physical Activity:     Days of Exercise per Week:     Minutes of Exercise per Session:    Stress:     Feeling of Stress :    Social Connections:     Frequency of Communication with Friends and Family:     Frequency of Social Gatherings with Friends and Family:     Attends Sikh Services:     Active Member of Clubs or Organizations:     Attends Club or Organization Meetings:     Marital Status:    Intimate Partner Violence:     Fear of Current or Ex-Partner:     Emotionally Abused:     Physically Abused:     Sexually Abused: ALLERGIES: Patient has no known allergies. Review of Systems   Unable to perform ROS: Dementia       Vitals:    07/28/21 1424   BP: (!) 148/73   Pulse: 87   Resp: 20   Temp: 98.7 °F (37.1 °C)   SpO2: 98%            Physical Exam  Vitals and nursing note reviewed. Constitutional:       General: He is in acute distress (Agitated male with vs as noted. ). Appearance: He is well-developed. He is not ill-appearing or diaphoretic. HENT:      Head: Normocephalic and atraumatic. Nose: Nose normal.   Eyes:      General: No scleral icterus.      Conjunctiva/sclera: Conjunctivae normal.      Pupils: Pupils are equal, round, and reactive to light.   Neck:      Thyroid: No thyromegaly. Vascular: No JVD. Trachea: No tracheal deviation. Comments: No carotid bruits noted. Cardiovascular:      Rate and Rhythm: Normal rate and regular rhythm. Heart sounds: Normal heart sounds. No murmur heard. No friction rub. No gallop. Pulmonary:      Effort: Pulmonary effort is normal. No respiratory distress. Breath sounds: Normal breath sounds. No wheezing or rales. Chest:      Chest wall: No tenderness. Abdominal:      General: Bowel sounds are normal. There is no distension. Palpations: Abdomen is soft. There is no mass. Tenderness: There is no abdominal tenderness. There is no guarding or rebound. Musculoskeletal:         General: No tenderness. Normal range of motion. Cervical back: Normal range of motion and neck supple. Lymphadenopathy:      Cervical: No cervical adenopathy. Skin:     General: Skin is warm and dry. Findings: No erythema or rash. Neurological:      Mental Status: He is alert and oriented to person, place, and time. Mental status is at baseline. Cranial Nerves: No cranial nerve deficit. Coordination: Coordination normal.      Deep Tendon Reflexes: Reflexes are normal and symmetric. Comments: Patient is nonverbal and will not acknowledge any verbal commands. Does not appear that he understands anything that is being said. He is agitated and moving all extremities without any difficulty. He is trying to get out of bed. Psychiatric:         Behavior: Behavior normal.         Thought Content:  Thought content normal.         Judgment: Judgment normal.      Comments: Unable to determine due to dementia          MDM  Number of Diagnoses or Management Options  Urinary tract infection without hematuria, site unspecified: new and requires workup     Amount and/or Complexity of Data Reviewed  Clinical lab tests: ordered and reviewed  Tests in the radiology section of CPT®: ordered and reviewed  Decide to obtain previous medical records or to obtain history from someone other than the patient: yes  Review and summarize past medical records: yes  Discuss the patient with other providers: yes    Risk of Complications, Morbidity, and/or Mortality  Presenting problems: high  Diagnostic procedures: high  Management options: high    Patient Progress  Patient progress: stable         Procedures    ED MD EKG interpretation: There is a sinus tachycardia at 122 beats a minute. Axis is normal and intervals appear normal.  There is diffuse ST and T wave change. No ectopy or acute ischemia is noted. The patient's urine culture basically growing 70,000 staph epidermidis colonies. He is resistant to Levaquin, Cipro and the oxacillin's. It is sensitive to tetracycline and to address urine tolling as well as gentamicin and vancomycin. Labs still pending. 3:33 PM  3:56 PM  Patient's lactate is reported at 4.2. He is agitated and non verbal.   Will require admission for treatment of uti he is currently being managed for. Organism not sensitive  to Keflex he is on. Perfect Serve Consult for Admission  4:08 PM    ED Room Number: IZ89/59  Patient Name and age:  Forest Kelly 80 y.o.  male  Working Diagnosis:   1. Urinary tract infection without hematuria, site unspecified        COVID-19 Suspicion:  no  Sepsis present:  no  Reassessment needed: yes  Code Status:  Full Code  Readmission: no  Isolation Requirements:  no  Recommended Level of Care:  telemetry  Department:[facility] Adult ED - 21   Other:      I have discussed this patient with the hospitalist.  She would like him to get a liter of fluid and then repeat the lactic. If we can get it under 4 patient can be admitted to the floor.     Total critical care time spent exclusive of procedures: 60 minutes

## 2021-07-28 NOTE — ED NOTES
Verbal shift change report given to KARTIK Perez (oncoming nurse) by Sukhwinder Augustin RN (offgoing nurse). Report included the following information SBAR, Kardex, ED Summary, [facility] and Recent Results.

## 2021-07-29 LAB
ANION GAP SERPL CALC-SCNC: 12 MMOL/L (ref 5–15)
BACTERIA SPEC CULT: NORMAL
BUN SERPL-MCNC: 22 MG/DL (ref 6–20)
BUN/CREAT SERPL: 32 (ref 12–20)
CALCIUM SERPL-MCNC: 8.9 MG/DL (ref 8.5–10.1)
CC UR VC: NORMAL
CHLORIDE SERPL-SCNC: 114 MMOL/L (ref 97–108)
CO2 SERPL-SCNC: 19 MMOL/L (ref 21–32)
CREAT SERPL-MCNC: 0.68 MG/DL (ref 0.7–1.3)
ERYTHROCYTE [DISTWIDTH] IN BLOOD BY AUTOMATED COUNT: 13.4 % (ref 11.5–14.5)
GLUCOSE SERPL-MCNC: 106 MG/DL (ref 65–100)
HCT VFR BLD AUTO: 41.4 % (ref 36.6–50.3)
HGB BLD-MCNC: 13.9 G/DL (ref 12.1–17)
MCH RBC QN AUTO: 30.2 PG (ref 26–34)
MCHC RBC AUTO-ENTMCNC: 33.6 G/DL (ref 30–36.5)
MCV RBC AUTO: 90 FL (ref 80–99)
NRBC # BLD: 0 K/UL (ref 0–0.01)
NRBC BLD-RTO: 0 PER 100 WBC
PLATELET # BLD AUTO: 213 K/UL (ref 150–400)
PMV BLD AUTO: 10 FL (ref 8.9–12.9)
POTASSIUM SERPL-SCNC: 4.1 MMOL/L (ref 3.5–5.1)
RBC # BLD AUTO: 4.6 M/UL (ref 4.1–5.7)
SERVICE CMNT-IMP: NORMAL
SODIUM SERPL-SCNC: 145 MMOL/L (ref 136–145)
VANCOMYCIN SERPL-MCNC: 5.7 UG/ML
WBC # BLD AUTO: 6.4 K/UL (ref 4.1–11.1)

## 2021-07-29 PROCEDURE — 36415 COLL VENOUS BLD VENIPUNCTURE: CPT

## 2021-07-29 PROCEDURE — 74011250637 HC RX REV CODE- 250/637: Performed by: NURSE PRACTITIONER

## 2021-07-29 PROCEDURE — 74011250636 HC RX REV CODE- 250/636: Performed by: NURSE PRACTITIONER

## 2021-07-29 PROCEDURE — 74011000250 HC RX REV CODE- 250: Performed by: NURSE PRACTITIONER

## 2021-07-29 PROCEDURE — 74011250636 HC RX REV CODE- 250/636: Performed by: FAMILY MEDICINE

## 2021-07-29 PROCEDURE — 74011000250 HC RX REV CODE- 250: Performed by: INTERNAL MEDICINE

## 2021-07-29 PROCEDURE — 85027 COMPLETE CBC AUTOMATED: CPT

## 2021-07-29 PROCEDURE — 74011250636 HC RX REV CODE- 250/636: Performed by: INTERNAL MEDICINE

## 2021-07-29 PROCEDURE — 80202 ASSAY OF VANCOMYCIN: CPT

## 2021-07-29 PROCEDURE — 80048 BASIC METABOLIC PNL TOTAL CA: CPT

## 2021-07-29 PROCEDURE — 74011250637 HC RX REV CODE- 250/637: Performed by: FAMILY MEDICINE

## 2021-07-29 PROCEDURE — 65660000000 HC RM CCU STEPDOWN

## 2021-07-29 PROCEDURE — 74011000258 HC RX REV CODE- 258: Performed by: FAMILY MEDICINE

## 2021-07-29 RX ORDER — MEMANTINE HYDROCHLORIDE 10 MG/1
10 TABLET ORAL 2 TIMES DAILY
COMMUNITY
End: 2021-08-13

## 2021-07-29 RX ORDER — DONEPEZIL HYDROCHLORIDE 10 MG/1
10 TABLET, FILM COATED ORAL
COMMUNITY
End: 2021-08-13

## 2021-07-29 RX ORDER — DONEPEZIL HYDROCHLORIDE 5 MG/1
10 TABLET, FILM COATED ORAL
Status: DISCONTINUED | OUTPATIENT
Start: 2021-07-29 | End: 2021-07-29

## 2021-07-29 RX ORDER — CIPROFLOXACIN 500 MG/5ML
500 KIT ORAL 2 TIMES DAILY
COMMUNITY
Start: 2021-07-27

## 2021-07-29 RX ORDER — DONEPEZIL HYDROCHLORIDE 5 MG/1
5 TABLET, FILM COATED ORAL
Status: DISCONTINUED | OUTPATIENT
Start: 2021-07-29 | End: 2021-08-02

## 2021-07-29 RX ORDER — MEMANTINE HYDROCHLORIDE 10 MG/1
10 TABLET ORAL 2 TIMES DAILY
Status: DISCONTINUED | OUTPATIENT
Start: 2021-07-29 | End: 2021-07-29

## 2021-07-29 RX ORDER — LEVOTHYROXINE SODIUM 50 UG/1
50 TABLET ORAL
COMMUNITY
End: 2021-08-13

## 2021-07-29 RX ORDER — QUETIAPINE FUMARATE 25 MG/1
25 TABLET, FILM COATED ORAL 3 TIMES DAILY
Status: DISCONTINUED | OUTPATIENT
Start: 2021-07-29 | End: 2021-08-04

## 2021-07-29 RX ORDER — DEXTROSE MONOHYDRATE 50 MG/ML
50 INJECTION, SOLUTION INTRAVENOUS CONTINUOUS
Status: DISCONTINUED | OUTPATIENT
Start: 2021-07-29 | End: 2021-08-10

## 2021-07-29 RX ORDER — MEMANTINE HYDROCHLORIDE 10 MG/1
5 TABLET ORAL 2 TIMES DAILY
Status: DISCONTINUED | OUTPATIENT
Start: 2021-07-29 | End: 2021-08-02

## 2021-07-29 RX ORDER — LEVOTHYROXINE SODIUM 50 UG/1
50 TABLET ORAL
Status: DISCONTINUED | OUTPATIENT
Start: 2021-07-29 | End: 2021-08-09

## 2021-07-29 RX ORDER — LORAZEPAM 2 MG/ML
1 CONCENTRATE ORAL
COMMUNITY
Start: 2021-07-24 | End: 2021-08-13

## 2021-07-29 RX ORDER — QUETIAPINE FUMARATE 50 MG/1
50 TABLET, FILM COATED ORAL 2 TIMES DAILY
COMMUNITY
End: 2021-08-13

## 2021-07-29 RX ORDER — HALOPERIDOL 5 MG/ML
2 INJECTION INTRAMUSCULAR
Status: DISCONTINUED | OUTPATIENT
Start: 2021-07-29 | End: 2021-08-04

## 2021-07-29 RX ORDER — SERTRALINE HYDROCHLORIDE 25 MG/1
25 TABLET, FILM COATED ORAL DAILY
Status: ON HOLD | COMMUNITY
End: 2021-08-13 | Stop reason: SDUPTHER

## 2021-07-29 RX ADMIN — MEROPENEM 500 MG: 500 INJECTION, POWDER, FOR SOLUTION INTRAVENOUS at 19:17

## 2021-07-29 RX ADMIN — HALOPERIDOL LACTATE 2 MG: 5 INJECTION, SOLUTION INTRAMUSCULAR at 23:42

## 2021-07-29 RX ADMIN — DEXTROSE MONOHYDRATE 75 ML/HR: 50 INJECTION, SOLUTION INTRAVENOUS at 20:05

## 2021-07-29 RX ADMIN — SODIUM CHLORIDE 100 ML/HR: 9 INJECTION, SOLUTION INTRAVENOUS at 13:41

## 2021-07-29 RX ADMIN — DONEPEZIL HYDROCHLORIDE 5 MG: 5 TABLET, FILM COATED ORAL at 21:17

## 2021-07-29 RX ADMIN — WATER 10 MG: 1 INJECTION INTRAMUSCULAR; INTRAVENOUS; SUBCUTANEOUS at 02:00

## 2021-07-29 RX ADMIN — MEMANTINE HYDROCHLORIDE 10 MG: 10 TABLET ORAL at 14:56

## 2021-07-29 RX ADMIN — ENOXAPARIN SODIUM 40 MG: 40 INJECTION SUBCUTANEOUS at 09:06

## 2021-07-29 RX ADMIN — FAMOTIDINE 20 MG: 10 INJECTION, SOLUTION INTRAVENOUS at 09:06

## 2021-07-29 RX ADMIN — MEMANTINE HYDROCHLORIDE 5 MG: 10 TABLET ORAL at 21:17

## 2021-07-29 RX ADMIN — QUETIAPINE FUMARATE 25 MG: 25 TABLET ORAL at 15:07

## 2021-07-29 RX ADMIN — VANCOMYCIN HYDROCHLORIDE 1000 MG: 1 INJECTION, POWDER, LYOPHILIZED, FOR SOLUTION INTRAVENOUS at 14:54

## 2021-07-29 NOTE — ROUTINE PROCESS
TRANSFER - IN REPORT:    Verbal report received from Ana (name) on Eldon Villela  being received from ED(unit) for routine progression of care      Report consisted of patients Situation, Background, Assessment and   Recommendations(SBAR). Information from the following report(s) SBAR, ED Summary, Intake/Output, MAR and Cardiac Rhythm ST was reviewed with the receiving nurse. Opportunity for questions and clarification was provided. Assessment completed upon patients arrival to unit and care assumed.

## 2021-07-29 NOTE — ROUTINE PROCESS
TRANSFER - OUT REPORT:    Verbal report given to KARTIK Vasquez(name) on [employer]  being transferred to (unit) for routine progression of care       Report consisted of patients Situation, Background, Assessment and   Recommendations(SBAR). Information from the following report(s) SBAR, Kardex, ED Summary, [facility] and Recent Results was reviewed with the receiving nurse. Lines:   Peripheral IV 07/28/21 Left Arm (Active)   Site Assessment Clean, dry, & intact 07/28/21 2053   Phlebitis Assessment 0 07/28/21 2053   Infiltration Assessment 0 07/28/21 2053   Dressing Status Clean, dry, & intact 07/28/21 2053   Hub Color/Line Status Pink 07/28/21 2053        Opportunity for questions and clarification was provided.       Patient transported with:   [employer]

## 2021-07-29 NOTE — PROGRESS NOTES
Problem: Falls - Risk of  Goal: *Absence of Falls  Description: Document Alka Cordova Fall Risk and appropriate interventions in the flowsheet. Outcome: Progressing Towards Goal  Note: Fall Risk Interventions:       Mentation Interventions: Adequate sleep, hydration, pain control, Bed/chair exit alarm, Family/sitter at bedside    Medication Interventions: Evaluate medications/consider consulting pharmacy    Elimination Interventions: Bed/chair exit alarm              Problem: Pressure Injury - Risk of  Goal: *Prevention of pressure injury  Description: Document Delfino Scale and appropriate interventions in the flowsheet.   Outcome: Progressing Towards Goal  Note: Pressure Injury Interventions:  Sensory Interventions: Assess changes in LOC         Activity Interventions: PT/OT evaluation    Mobility Interventions: PT/OT evaluation    Nutrition Interventions: Discuss nutritional consult with provider    Friction and Shear Interventions: Apply protective barrier, creams and emollients, Foam dressings/transparent film/skin sealants, Minimize layers

## 2021-07-29 NOTE — CONSULTS
[address]    Name:  Karena Holguin  MR#:  [id number]  :  1933  ACCOUNT #:  [de-identified]  DATE OF SERVICE:  2021    BEHAVIORAL HEALTH CONSULTATION    CHIEF COMPLAINT:  \"Yes. \"    HISTORY OF PRESENT ILLNESS:  The patient is an 80-year-old male who is currently being seen in the medical unit for psychiatric consultation for evaluation of psychosis, dementia, agitation. He is a poor historian. He only looked at me briefly and said \"yes\" and closed his eyes. He did not participate at all during the interview. No history is obtained from him. All information is obtained from his chart and from nursing staff. Reportedly, the patient has been diagnosed with dementia of Alzheimer's type. The patient resides at [facility], he has been agitated there, refusing his medications. The plan was for him to be transferred at [facility], [place], operated by [facility], but the facility required a medical clearance before he could be admitted. Upon his arrival in our ER, he was extremely combative and agitated. He was given Geodon and Ativan which seemed to be effective. It was reported by his family in the emergency room that he is scheduled to undergo TURP due to his intermittent urinary retention. He is on Orta. He has a history of chronic UTI in the past.  He is currently being treated for lactic acidosis and possible UTI. Nursing staff reported that the patient was combative and agitated last night. He hit the nurses, he has been pulling things. He has been impulsive and has been trying to get out of bed. He was given Ativan, Haldol, and Benadryl IM. This morning, according to the nursing staff, he has been much calmer other than trying to get out of the bed and impulsive, but markedly confused, but so far no aggression noted. Nursing staff report he tends to be agitated in the evening to night time. Sundowning behaviors evident.  He is currently prescribed Namenda, Aricept, and Seroquel, but has been refusing his medications. He did not answer if he has SI or HI. PAST MEDICAL HISTORY:  See H and P.     Past Medical History:   Diagnosis Date    Arthritis     Asthma     Benign prostate hyperplasia     Dementia ([facility] [address]     Hypothyroidism     Spinal stenosis of lumbar region with radiculopathy 6/26/2013       Labs: (reviewed/updated 7/29/2021)  Patient Vitals for the past 8 hrs:   BP Temp Pulse Resp SpO2   07/29/21 2011 116/66 98.4 °F (36.9 °C) 98 16 99 %   07/29/21 1610 113/65 98.6 °F (37 °C) 83 16 99 %     Labs Reviewed   CBC WITH AUTOMATED DIFF - Abnormal; Notable for the following components:       Result Value    MONOCYTES 4 (*)     All other components within normal limits   METABOLIC PANEL, COMPREHENSIVE - Abnormal; Notable for the following components:    Glucose 111 (*)     BUN 41 (*)     BUN/Creatinine ratio 33 (*)     GFR est non-AA 54 (*)     Bilirubin, total 1.5 (*)     All other components within normal limits   ACETAMINOPHEN - Abnormal; Notable for the following components:    Acetaminophen level <2 (*)     All other components within normal limits   SALICYLATE - Abnormal; Notable for the following components:    Salicylate level <5.2 (*)     All other components within normal limits   URINALYSIS W/ RFLX MICROSCOPIC - Abnormal; Notable for the following components:    Appearance TURBID (*)     Protein 100 (*)     Ketone >80 (*)     Blood LARGE (*)     Leukocyte Esterase LARGE (*)     CA Oxalate crystals FEW (*)     All other components within normal limits   LACTIC ACID - Abnormal; Notable for the following components:    Lactic acid 4.2 (*)     All other components within normal limits   METABOLIC PANEL, BASIC - Abnormal; Notable for the following components:    Chloride 114 (*)     CO2 19 (*)     Glucose 106 (*)     BUN 22 (*)     Creatinine 0.68 (*)     BUN/Creatinine ratio 32 (*)     All other components within normal limits   CULTURE, BLOOD, PAIRED   CULTURE, URINE   ETHYL ALCOHOL   DRUG SCREEN, URINE   SAMPLES BEING HELD   LACTIC ACID   *UA&MICRO CHARGE BAT   CBC W/O DIFF   VANCOMYCIN, RANDOM   METABOLIC PANEL, BASIC   SAMPLE TO BLOOD BANK     Lab Results   Component Value Date/Time    Sodium 145 07/29/2021 12:33 PM    Potassium 4.1 07/29/2021 12:33 PM    Chloride 114 (H) 07/29/2021 12:33 PM    CO2 19 (L) 07/29/2021 12:33 PM    Anion gap 12 07/29/2021 12:33 PM    Glucose 106 (H) 07/29/2021 12:33 PM    BUN 22 (H) 07/29/2021 12:33 PM    Creatinine 0.68 (L) 07/29/2021 12:33 PM    BUN/Creatinine ratio 32 (H) 07/29/2021 12:33 PM    GFR est AA >60 07/29/2021 12:33 PM    GFR est non-AA >60 07/29/2021 12:33 PM    Calcium 8.9 07/29/2021 12:33 PM    Bilirubin, total 1.5 (H) 07/28/2021 02:59 PM    Alk. phosphatase 89 07/28/2021 02:59 PM    Protein, total 7.8 07/28/2021 02:59 PM    Albumin 4.4 07/28/2021 02:59 PM    Globulin 3.4 07/28/2021 02:59 PM    A-G Ratio 1.3 07/28/2021 02:59 PM    ALT (SGPT) 31 07/28/2021 02:59 PM     Admission on 07/28/2021   Component Date Value Ref Range Status    WBC 07/28/2021 9.8  4.1 - 11.1 K/uL Final    RBC 07/28/2021 4.72  4.10 - 5.70 M/uL Final    HGB 07/28/2021 14.2  12.1 - 17.0 g/dL Final    HCT 07/28/2021 42.7  36.6 - 50.3 % Final    MCV 07/28/2021 90.5  80.0 - 99.0 FL Final    MCH 07/28/2021 30.1  26.0 - 34.0 PG Final    MCHC 07/28/2021 33.3  30.0 - 36.5 g/dL Final    RDW 07/28/2021 13.3  11.5 - 14.5 % Final    PLATELET 54/17/0365 896  150 - 400 K/uL Final    MPV 07/28/2021 10.2  8.9 - 12.9 FL Final    NRBC 07/28/2021 0.0  0  WBC Final    ABSOLUTE NRBC 07/28/2021 0.00  0.00 - 0.01 K/uL Final    NEUTROPHILS 07/28/2021 70  32 - 75 % Final    LYMPHOCYTES 07/28/2021 23  12 - 49 % Final    MONOCYTES 07/28/2021 4* 5 - 13 % Final    EOSINOPHILS 07/28/2021 2  0 - 7 % Final    BASOPHILS 07/28/2021 1  0 - 1 % Final    IMMATURE GRANULOCYTES 07/28/2021 0  0.0 - 0.5 % Final    ABS.  NEUTROPHILS 07/28/2021 6.9  1.8 - 8.0 K/UL Final  ABS. LYMPHOCYTES 07/28/2021 2.3  0.8 - 3.5 K/UL Final    ABS. MONOCYTES 07/28/2021 0.4  0.0 - 1.0 K/UL Final    ABS. EOSINOPHILS 07/28/2021 0.2  0.0 - 0.4 K/UL Final    ABS. BASOPHILS 07/28/2021 0.1  0.0 - 0.1 K/UL Final    ABS. IMM. GRANS. 07/28/2021 0.0  0.00 - 0.04 K/UL Final    DF 07/28/2021 AUTOMATED    Final    Sodium 07/28/2021 142  136 - 145 mmol/L Final    Potassium 07/28/2021 4.2  3.5 - 5.1 mmol/L Final    Chloride 07/28/2021 106  97 - 108 mmol/L Final    CO2 07/28/2021 23  21 - 32 mmol/L Final    Anion gap 07/28/2021 13  5 - 15 mmol/L Final    Glucose 07/28/2021 111* 65 - 100 mg/dL Final    BUN 07/28/2021 41* 6 - 20 MG/DL Final    Creatinine 07/28/2021 1.26  0.70 - 1.30 MG/DL Final    BUN/Creatinine ratio 07/28/2021 33* 12 - 20   Final    GFR est AA 07/28/2021 >60  >60 ml/min/1.73m2 Final    GFR est non-AA 07/28/2021 54* >60 ml/min/1.73m2 Final    Calcium 07/28/2021 9.9  8.5 - 10.1 MG/DL Final    Bilirubin, total 07/28/2021 1.5* 0.2 - 1.0 MG/DL Final    ALT (SGPT) 07/28/2021 31  12 - 78 U/L Final    AST (SGOT) 07/28/2021 34  15 - 37 U/L Final    Alk.  phosphatase 07/28/2021 89  45 - 117 U/L Final    Protein, total 07/28/2021 7.8  6.4 - 8.2 g/dL Final    Albumin 07/28/2021 4.4  3.5 - 5.0 g/dL Final    Globulin 07/28/2021 3.4  2.0 - 4.0 g/dL Final    A-G Ratio 07/28/2021 1.3  1.1 - 2.2   Final    Acetaminophen level 07/28/2021 <2* 10 - 30 ug/mL Final    ALCOHOL(ETHYL),SERUM 07/28/2021 <10  <10 MG/DL Final    Salicylate level 07/52/0877 <1.7* 2.8 - 20.0 MG/DL Final    AMPHETAMINES 07/28/2021 Negative  NEG   Final    BARBITURATES 07/28/2021 Negative  NEG   Final    BENZODIAZEPINES 07/28/2021 Negative  NEG   Final    COCAINE 07/28/2021 Negative  NEG   Final    METHADONE 07/28/2021 Negative  NEG   Final    OPIATES 07/28/2021 Negative  NEG   Final    PCP(PHENCYCLIDINE) 07/28/2021 Negative  NEG   Final    THC (TH-CANNABINOL) 07/28/2021 Negative  NEG   Final    Drug screen comment 07/28/2021 (NOTE)   Final    Color 07/28/2021 STRAW    Final    Appearance 07/28/2021 TURBID* CLEAR   Final    Specific gravity 07/28/2021 1.020  1.003 - 1.030   Final    pH (UA) 07/28/2021 5.0  5.0 - 8.0   Final    Protein 07/28/2021 100* NEG mg/dL Final    Glucose 07/28/2021 Negative  NEG mg/dL Final    Ketone 07/28/2021 >80* NEG mg/dL Final    Bilirubin 07/28/2021 Negative  NEG   Final    Blood 07/28/2021 LARGE* NEG   Final    Urobilinogen 07/28/2021 0.2  0.2 - 1.0 EU/dL Final    Nitrites 07/28/2021 Negative  NEG   Final    Leukocyte Esterase 07/28/2021 LARGE* NEG   Final    WBC 07/28/2021 20-50  0 - 4 /hpf Final    RBC 07/28/2021 20-50  0 - 5 /hpf Final    Epithelial cells 07/28/2021 FEW  FEW /lpf Final    Bacteria 07/28/2021 Negative  NEG /hpf Final    CA Oxalate crystals 07/28/2021 FEW* NEG   Final    Hyaline cast 07/28/2021 0-2  0 - 5 /lpf Final    Ventricular Rate 07/28/2021 122  BPM Final    Atrial Rate 07/28/2021 122  BPM Final    P-R Interval 07/28/2021 162  ms Final    QRS Duration 07/28/2021 68  ms Final    Q-T Interval 07/28/2021 312  ms Final    QTC Calculation (Bezet) 07/28/2021 444  ms Final    Calculated P Axis 07/28/2021 81  degrees Final    Calculated R Axis 07/28/2021 71  degrees Final    Calculated T Axis 07/28/2021 78  degrees Final    Diagnosis 07/28/2021    Final                    Value:Sinus tachycardia  Nonspecific ST and T wave abnormality  When compared with ECG of 22-JUL-2021 21:13,  Vent.  rate has increased BY  42 BPM  Nonspecific T wave abnormality no longer evident in Anterior leads  Confirmed by Mitali Adamson M.D., Jina Vidal ([id number]) on 7/28/2021 5:50:59 PM      Lactic acid 07/28/2021 4.2* 0.4 - 2.0 MMOL/L Final    Special Requests: 07/28/2021 NO SPECIAL REQUESTS    Preliminary    Culture result: 07/28/2021 NO GROWTH AFTER 13 HOURS    Preliminary    Special Requests: 07/28/2021 NO SPECIAL REQUESTS    Final    [place] Count 07/28/2021     Final Value:[id number]  COLONIES/mL      Culture result: 07/28/2021 MIXED UROGENITAL IGOR ISOLATED    Final    SAMPLES BEING HELD 07/28/2021 1BLU   Final    COMMENT 07/28/2021 Add-on orders for these samples will be processed based on acceptable specimen integrity and analyte stability, which may vary by analyte.     Final    Lactic acid 07/28/2021 1.5  0.4 - 2.0 MMOL/L Final    WBC 07/29/2021 6.4  4.1 - 11.1 K/uL Final    RBC 07/29/2021 4.60  4.10 - 5.70 M/uL Final    HGB 07/29/2021 13.9  12.1 - 17.0 g/dL Final    HCT 07/29/2021 41.4  36.6 - 50.3 % Final    MCV 07/29/2021 90.0  80.0 - 99.0 FL Final    MCH 07/29/2021 30.2  26.0 - 34.0 PG Final    MCHC 07/29/2021 33.6  30.0 - 36.5 g/dL Final    RDW 07/29/2021 13.4  11.5 - 14.5 % Final    PLATELET 29/07/4427 355  150 - 400 K/uL Final    MPV 07/29/2021 10.0  8.9 - 12.9 FL Final    NRBC 07/29/2021 0.0  0  WBC Final    ABSOLUTE NRBC 07/29/2021 0.00  0.00 - 0.01 K/uL Final    Vancomycin, random 07/29/2021 5.7  UG/ML Final    Sodium 07/29/2021 145  136 - 145 mmol/L Final    Potassium 07/29/2021 4.1  3.5 - 5.1 mmol/L Final    Chloride 07/29/2021 114* 97 - 108 mmol/L Final    CO2 07/29/2021 19* 21 - 32 mmol/L Final    Anion gap 07/29/2021 12  5 - 15 mmol/L Final    Glucose 07/29/2021 106* 65 - 100 mg/dL Final    BUN 07/29/2021 22* 6 - 20 MG/DL Final    Creatinine 07/29/2021 0.68* 0.70 - 1.30 MG/DL Final    BUN/Creatinine ratio 07/29/2021 32* 12 - 20   Final    GFR est AA 07/29/2021 >60  >60 ml/min/1.73m2 Final    GFR est non-AA 07/29/2021 >60  >60 ml/min/1.73m2 Final    Calcium 07/29/2021 8.9  8.5 - 10.1 MG/DL Final     Vitals:    07/29/21 0903 07/29/21 1214 07/29/21 1610 07/29/21 2011   BP: (!) 151/88 126/63 113/65 116/66   Pulse: 80 81 83 98   Resp: 16 18 16 16   Temp: 97.8 °F (36.6 °C) 97.8 °F (36.6 °C) 98.6 °F (37 °C) 98.4 °F (36.9 °C)   SpO2: 96% 100% 99% 99%   Weight:       Height:         Recent Results (from the past 24 hour(s)) DRUG SCREEN, URINE    Collection Time: 07/28/21  8:44 PM   Result Value Ref Range    AMPHETAMINES Negative NEG      BARBITURATES Negative NEG      BENZODIAZEPINES Negative NEG      COCAINE Negative NEG      METHADONE Negative NEG      OPIATES Negative NEG      PCP(PHENCYCLIDINE) Negative NEG      THC (TH-CANNABINOL) Negative NEG      Drug screen comment (NOTE)    URINALYSIS W/ RFLX MICROSCOPIC    Collection Time: 07/28/21  8:44 PM   Result Value Ref Range    Color STRAW      Appearance TURBID (A) CLEAR      Specific gravity 1.020 1.003 - 1.030      pH (UA) 5.0 5.0 - 8.0      Protein 100 (A) NEG mg/dL    Glucose Negative NEG mg/dL    Ketone >80 (A) NEG mg/dL    Bilirubin Negative NEG      Blood LARGE (A) NEG      Urobilinogen 0.2 0.2 - 1.0 EU/dL    Nitrites Negative NEG      Leukocyte Esterase LARGE (A) NEG      WBC 20-50 0 - 4 /hpf    RBC 20-50 0 - 5 /hpf    Epithelial cells FEW FEW /lpf    Bacteria Negative NEG /hpf    CA Oxalate crystals FEW (A) NEG      Hyaline cast 0-2 0 - 5 /lpf   CULTURE, URINE    Collection Time: 07/28/21  8:44 PM    Specimen: Cath Urine    URINE   Result Value Ref Range    Special Requests: NO SPECIAL REQUESTS      [place] Count [id number]  COLONIES/mL        Culture result: MIXED UROGENITAL IGOR ISOLATED     CBC W/O DIFF    Collection Time: 07/29/21  3:30 AM   Result Value Ref Range    WBC 6.4 4.1 - 11.1 K/uL    RBC 4.60 4.10 - 5.70 M/uL    HGB 13.9 12.1 - 17.0 g/dL    HCT 41.4 36.6 - 50.3 %    MCV 90.0 80.0 - 99.0 FL    MCH 30.2 26.0 - 34.0 PG    MCHC 33.6 30.0 - 36.5 g/dL    RDW 13.4 11.5 - 14.5 %    PLATELET 551 990 - 123 K/uL    MPV 10.0 8.9 - 12.9 FL    NRBC 0.0 0  WBC    ABSOLUTE NRBC 0.00 0.00 - 0.01 K/uL   VANCOMYCIN, RANDOM    Collection Time: 07/29/21 12:33 PM   Result Value Ref Range    Vancomycin, random 5.7 UG/ML   METABOLIC PANEL, BASIC    Collection Time: 07/29/21 12:33 PM   Result Value Ref Range    Sodium 145 136 - 145 mmol/L    Potassium 4.1 3.5 - 5.1 mmol/L Chloride 114 (H) 97 - 108 mmol/L    CO2 19 (L) 21 - 32 mmol/L    Anion gap 12 5 - 15 mmol/L    Glucose 106 (H) 65 - 100 mg/dL    BUN 22 (H) 6 - 20 MG/DL    Creatinine 0.68 (L) 0.70 - 1.30 MG/DL    BUN/Creatinine ratio 32 (H) 12 - 20      GFR est AA >60 >60 ml/min/1.73m2    GFR est non-AA >60 >60 ml/min/1.73m2    Calcium 8.9 8.5 - 10.1 MG/DL       RADIOLOGY REPORTS:  Results from Hospital Encounter encounter on 07/28/21    XR CHEST PORT    Narrative  EXAM: XR CHEST PORT    INDICATION: Agitation, rule out infection    COMPARISON: 6/17/2021    FINDINGS: A portable AP radiograph of the chest was obtained at 1602 hours. The  patient is on a cardiac monitor. The lungs are clear. The cardiac and  mediastinal contours and pulmonary vascularity are normal.  The bones and soft  tissues are grossly within normal limits. Impression  Normal chest.  CT HEAD WO CONT    Result Date: 7/22/2021  CLINICAL HISTORY: Altered mental status INDICATION: Altered mental status COMPARISON: None. CT dose reduction was achieved through use of a standardized protocol tailored for this examination and automatic exposure control for dose modulation. TECHNIQUE: Serial axial images with a collimation of 5 mm were obtained from the skull base through the vertex  FINDINGS: There is sulcal and ventricular prominence. Confluent periventricular and scattered foci of hypodensity in the cerebral white matter. There is no evidence of an acute infarction, hemorrhage, or mass-effect. There is no evidence of midline shift or hydrocephalus. Posterior fossa structures are unremarkable. No extra-axial collections are seen. Mastoid air cells are well pneumatized and clear. There is no evidence of depressed skull fractures of soft tissue swelling. No acute intracranial process. Imaging findings consistent with moderate chronic microvascular ischemic change. There is a severe degree of cerebral atrophy.       CT ABD PELV WO CONT    Result Date: 6/18/2021  INDICATION: Fever, UTI, urinary retention. Exam: Noncontrast CT of the abdomen and pelvis is performed with 5 mm collimation. Sagittal and coronal reformatted images were also performed. CT dose reduction was achieved through the use of a standardized protocol tailored for this examination and automatic exposure control for dose modulation. Direct comparison is made to prior CT dated April 2021. FINDINGS: There is very mild dependent atelectasis in the dependent right lower lobe. Abdomen: Liver: The liver is normal on noncontrast images. Spleen: The spleen is normal on noncontrast images. Adrenals: The adrenals are normal on noncontrast images. Pancreas: The pancreas is normal on noncontrast images. Gallbladder: The gallbladder is normal on noncontrast images. Kidneys: There is no perinephric stranding, hydronephrosis or hydroureter. No renal, ureteral bladder calculus is visualized. Bowel: No thickened or dilated loop of large or small bowel seen. Scattered colonic diverticulosis is noted. Appendix: The appendix is normal. Urinary bladder: The urinary bladder is partially filled. There is circumferential prominence of the urinary bladder wall and there is subtle stranding of the fat adjacent to the urinary bladder. Orta catheter is present within the urinary bladder. Nondependent gas is noted within the urinary bladder. Miscellaneous: There is no free intraperitoneal gas or fluid. There is no focal fluid collection to suggest abscess. Circumferential urinary bladder wall prominence and subtle stranding of the adjacent fat. Findings may represent cystitis. XR CHEST PORT    Result Date: 7/28/2021  EXAM: XR CHEST PORT INDICATION: Agitation, rule out infection COMPARISON: 6/17/2021 FINDINGS: A portable AP radiograph of the chest was obtained at 1602 hours. The patient is on a cardiac monitor. The lungs are clear.  The cardiac and mediastinal contours and pulmonary vascularity are normal.  The bones and soft tissues are grossly within normal limits. Normal chest.    XR CHEST PORT    Result Date: 6/17/2021  INDICATION: Fever. Portable AP view of the chest. Direct comparison made to prior chest x-ray dated 6/2013. Cardiomediastinal silhouette is stable. Lungs are clear bilaterally. Pleural spaces are normal and there is no pneumothorax. Osseous structures are intact. No acute cardiopulmonary disease. PAST PSYCHIATRIC HISTORY:  The patient has been diagnosed with dementia of Alzheimer's type. He is currently prescribed Aricept, Namenda, and Seroquel. PSYCHOSOCIAL HISTORY:  He currently resides at [facility]. MENTAL STATUS EXAMINATION:  The patient is currently lying in bed, dressed in hospital apparel. No eye contact. He only said \"yes\" to me and then declined to answer any of my questions. A full mental status exam could not be completed. ASSESSMENT AND PLANNING:  The patient carries a diagnosis of dementia of Alzheimer's type with behavioral disturbance. I will restart his Namenda and Aricept at the current dosing. He has been off his Seroquel, and so I will restart his Seroquel on a lower dose. We will start him on 25 mg three times a day. We will continue Zoloft. We will put him on Haldol IM for agitation. I recommend for him to be transferred to [place] [address] [facility] once he is medically cleared. However, he has to be evaluated by [facility] for TDO evaluation since he has no capacity to voluntarily consent for treatment. We will continue sitter at this time. Thank you for this consult. Please call with questions.       NAILA TURNER NP [email or web address]  D:  07/29/2021 15:17  T:  07/29/2021 16:36  JOB #:  [id number]

## 2021-07-29 NOTE — PROGRESS NOTES
Pharmacist Note - Vancomycin Dosing  Therapy day 2  Indication: sepsis  Current regimen: 750 mg IV Q24H    Recent Labs     07/29/21  1233 07/29/21  0330 07/28/21  1459   WBC  --  6.4 9.8   CREA 0.68*  --  1.26   BUN 22*  --  41*       A random vancomycin level of 5.7 mcg/mL was obtained and from this level, the patient's AUC24 at steady state is calculated to be 270 with the current regimen. Goal target range AUC/MISTY 400-600      Plan: Change to 1000 mg IV Q18H. Pharmacy will continue to monitor this patient daily for changes in clinical status and renal function. *Random vancomycin levels are used to calculate AUC/MISTY, this level should not be interpreted as a trough. Vancomycin has been dosed using Bayesian kinetics software to target an AUC24:MISTY of 400-600, which provides adequate exposure for as assumed infection due to MRSA with an MISTY of 1 or less while reducing the risk of nephrotoxicity as seen with traditional trough based dosing goals.

## 2021-07-29 NOTE — PROGRESS NOTES
Patient and his wife both reside at [facility] assisted living facility in the [address] [place] [address]. Their phone is [phone]. Patient has had a hx of urinary tract infections and most recently her has become more disoriented and physically unable to be controlled. He now requires a sitter and has been evaluated by the Psych Nurse Thao Joy Norah

## 2021-07-29 NOTE — ROUTINE PROCESS
Called Fel[facility]. Requested fax med list to unit [phone]      Med list from [facility] updated on pt chart. Also on list -- unable to add to med record:  CHAD ELIO Gen \"compound drug\" apply 1 ml topically to inner wrist Q4h PRN for agitation (per 1 ml  Ativan 1 ml, Haldol 2 mg, Benedryl 12.5 mg) q4 hr as needed for agitation.

## 2021-07-29 NOTE — ROUTINE PROCESS
Bedside shift change report given to [address] (oncoming nurse) by Manisha Solis (offgoing nurse).  Report included the following information SBAR, ED Summary, Procedure Summary, Intake/Output, MAR, Recent Results and Cardiac Rhythm ST.

## 2021-07-29 NOTE — H&P
[facility]  HISTORY AND PHYSICAL    Name:  Cara Rosales  MR#:  [id number]  :  1933  ACCOUNT #:  [de-identified]  ADMIT DATE:  2021      CHIEF COMPLAINT:  Lactic acidosis noted in the ER, possible urinary tract infection. HISTORY OF PRESENT ILLNESS:  The patient is an 51-year-old male with longstanding history of dementia of Alzheimer's type. The patient is in a Memory Unit because of his agitation and psych issues, the patient was being transferred to the Psychiatry unit at [facility]. In [facility], the facility had wanted a physical and routine labs to be done at a facility before accepting the patient. The patient was evaluated in the ER. The patient upon arrival to the [facility] ER was extremely combative and agitated, received Geodon and some Ativan after which he became less combative and manageable. The patient is unable to give any history in the ER. Further questioning from the family members, it was noted that he is scheduled to undergo some TURP and has had intermittent urinary retention for which he has chronic Orta. The patient has a history of chronic urinary tract infections in the past.  The urine that was obtained was extremely dark colored for which he received a dose of vancomycin. The labs showed significantly elevated lactic acidosis of 4.5 for which IV fluids were administered. The patient's lactic acid came down to 1.5 after that. Currently, he is resting comfortably, responds to the verbal and tactile stimuli. No signs of agitation were noted. No further history can be obtained because of the memory. PAST MEDICAL HISTORY:  1. History of dementia of Alzheimer's type. 2.  History of asthma. 3.  History of BPH. 4.  History of hypothyroidism. 5.  History of spinal stenosis. PAST SURGICAL HISTORY:  Status post tonsillectomy, adenoidectomy. ALLERGIES:  NO KNOWN ALLERGIES. CURRENT MEDICATIONS:  1.   Keflex 250 mg every 8 hours.  2.  Probiotics. 3.  Razadyne 16 mg tablet p.o. daily. 4.  Vitamin B12 Complexes. 5.  Vitamin D3 5000 units daily. 6.  Ginkgo biloba. 7.  Proscar 5 mg tablets p.o. daily. 8.  Flomax 0.4 mg p.o. daily. 9.  Synthroid 75 mcg p.o. daily. 10.  Zoloft 50 mg tablet p.o. daily. 11.  Omega fatty acids. SOCIAL HISTORY:  Former smoker. Social drinker. FAMILY HISTORY:  Noncontributory. REVIEW OF SYSTEMS:  Could not be done because of the dementia. PHYSICAL EXAMINATION:  GENERAL:  The patient responds to verbal and tactile stimuli, opens the eyes. HEAD, EYES, EARS, NOSE, AND THROAT:  Pupils are equal, reactive to light. Oral mucosa was dry. NECK:  Supple. LUNGS:  Clear. CARDIOVASCULAR:  S1 and S2 audible. No S3, S4.  ABDOMEN:  Soft, nontender, nondistended. EXTREMITIES:  There was no edema. SKIN:  There was no bruising. Ulceration was noted. LABORATORY DATA:  CBC was within normal limits. CMP showed sodium of 142, potassium , chloride of 106, bicarb of 23, BUN of 41, creatinine of 1.26. Bilirubin total was 1.5. ALT and AST were 31 and 34. Lactic acid was 4.2. EKG showed mild sinus tachycardia with nonspecific ST segment changes. Urinalysis was pending at the time of the dictation; however, upon examination the urine was dark, tarry colored, foul smelling. ASSESSMENT AND PLAN:  1. The patient is an 27-year-old male with a history of advanced dementia with psychosis and agitation, presents to the ER for physical and medical clearance and labs, found to have significant lactic acidosis of 4.5, on 1 L of IV fluids, also found to be dehydrated with elevated BUN. We will admit the patient on telemetry unit, start the patient on IV fluids with normal saline. Urinalysis with urine cultures. Start the patient on broad-spectrum antibiotics with vancomycin and Rocephin. 2.  Lactic acidosis, resolved. 3.  Dementia of Alzheimer's type, continue with Razadyne, galantamine.   4.  History of benign prostatic hypertrophy, continue with the Flomax and Proscar. Needs evaluation by urologist, was put in the consult. 5.  Dementia with psychosis, continue with Geodon p.r.n.  6.  Hypothyroidism, continue with Synthroid. CODE STATUS:  Full code. DVT PROPHYLAXIS:  The patient received Lovenox.         Gail Ames MD      [id number]  D:  07/28/2021 20:38  T:  07/29/2021 2:28  JOB #:  [id number]

## 2021-07-29 NOTE — PROGRESS NOTES
Pharmacist Note - Vancomycin Dosing    Consult provided for this 80 y.o. male for indication of UTI. Antibiotic regimen(s): Vanc + CTX    Recent Labs     21  1459   WBC 9.8   CREA 1.26   BUN 41*     Height: 170 cm  Weight: 55.8 kg  Est CrCl: 62 ml/min  Temp (24hrs), Av.1 °F (36.7 °C), Min:97.5 °F (36.4 °C), Max:98.7 °F (37.1 °C)    Cultures:  Urine () Oxacillin resistant Stap Epi    The plan below is expected to result in a target range of AUC/MISTY 400-600    A 1250 mg loading dose was ordered in the ED; to be followed by a maintenance dose of 750 mg IV every 24 hours. Pharmacy to follow patient daily and order levels / make dose adjustments as appropriate. *Vancomycin has been dosed used Bayesian kinetics software to target an AUC/MISTY of 400-600, which provides adequate exposure for an assumed infection due to MRSA with an MISTY of 1 or less while reducing the risk of nephrotoxicity as seen with traditional trough based dosing goals.

## 2021-07-29 NOTE — ROUTINE PROCESS
Primary Nurse Vladimir Larson and Michael Reynoso RN performed a dual skin assessment on this patient No impairment noted  Delfino score is 16    Pt has generalized ecchymosis, skin tears (small) redness on scrotum/penis, enlarged urethra, redness on ankles.   No skin breakdown

## 2021-07-29 NOTE — H&P (VIEW-ONLY)
Requesting Provider: Elena Sherwood MD - Reason for Consultation: \"chronic rodriguez, hx of BPH\"  Pre-existing [facility] Patient:   No                Patient: Judge De La Fuente MRN: [id number]  SSN: [id number]    YOB: 1933  Age: 80 y.o. Sex: male     Location: [facility]       Code Status: Full Code   PCP: Other, MD Anna  - None   Emergency Contact:  Primary Emergency Contact: Rashida Galdamez, Home Phone: [phone]   Race/Roman Catholic/Language: Soo Camacho / Tammie Briscoe / Lorene Ferguson   Payor: Payor: [employer] / Plan: Audie Dark / Product Type: Medicare /    Prior Admission Data: 7/26/21 [facility]     Hospitalized:  Hospital Day: 2 - Admitted 7/28/2021  2:03 PM   POD # * No surgery found *  by * Surgery not found * - Blood Loss: * No surgery found * * Surgery not found *     CONSULTANTS  ED CONSULT TO [facility]  IP CONSULT TO PSYCHIATRY  IP CONSULT TO [address] [place] [address]    ICD-10-CM ICD-9-CM   1. Urinary tract infection without hematuria, site unspecified  N39.0 599.0         Assessment/Plan:       Hx of BPH and chronic rodriguez    - scheduled for a TURP on 08/11/21 at 1pm with Dr. Karen Hooker at [facility]  - maintain rodriguez, please ensure protection of rodriguez  - follow urine cultures, tailor abx therapy if warranted  - continue daily flomax    Supervising MD, Dr. Marlene Salvador     CC: Aggressive behavior   HPI: 80yoM. Per chart review, PMH of longstanding history of dementia of Alzheimer's type. Upon arrival to the [facility] ER was extremely combative and agitated, received Geodon and some Ativan after which he became less combative and manageable. [facility] was consulted regarding a chronic rodriguez with hx of BPH. Rodriguez placed 07/28/21. Patient was last seen by Dr. Karen Hooker on 06/29/21. Last OV note below. Patient has a scheduled TURP on 08/11/21. Assessment limited d/t hx of dementia. Patient with a sitter present in room.  Rodriguez in place draining clear yellow UA. No acute distress noted. afvss  WBC, Hgb: wnl  Bcx: no growth  Cr: 1.26, 07/28/21  UA: 20-50 RBCs, 20-50 WBCs, no bacteria, few CA oxalate crystals  Ucx: pending  +rocephin, vanc  +flomax    06/18/21  CT AP wo:  IMPRESSION  Circumferential urinary bladder wall prominence and subtle stranding  of the adjacent fat. Findings may represent cystitis.    ===last OV note below===    This is a 59-year-old male that presents as a second opinion visit. He was seen last in our office by Dr. Dana Brambila on 6/16/2021 for history of urinary retention who initially passed a void trial but came back with a bladder scan over a liter. They had a discussion to move forward with the laser procedure for BPH/bladder outlet. The catheter was left out. CT images from [facility] imaging showed a 40 to 50 g prostate (~42g on my read) with significant bladder distention and mild bilateral hydronephrosis. On finasteride as well as Flomax. PSA 0.874 on 6/9/2021. History of dementia, arthritis, BPH, hypothyroidism. Poor historian. In the ER on 6/16 for Orta replacement and then to [facility] 6/17/2021 from assisted living facility for fever and lethargy. Readmitted. Presented with fever 102.4. Creatinine 1.04. CT of the abdomen and pelvis without contrast 6/18/2021 showed bladder wall thickening with a Orta catheter in place. Urine culture no growth. Seen as an inpatient consult by Romelia Sandhoff NP with Dr. Saniya Emanuel supervising. Orta catheter kept in place. Was initially scheduled for TURP procedure 7/6/2021. We received a call on 6/20 that the patient had pulled out his own Orta catheter that his facility but after discussion it seems like he tripped on the catheter and it accidentally pulled out. He is accompanied today by his wife, Malen Hodgkins. His son, Cindy Becerril, joins on the phone via speaker. His caregiver, T, is also in the room.   His wife tells me that the patient had prostate surgery (confirms not prostate cancer related) about 10 to 15 years ago in [place]. Recently has decreased appetite, in bed. Grey Camas over the weekend playing corn hole. The patient himself provides no history today. PAST MEDICAL HISTORY:    Allergies: No known allergies. DENIES: Latex, Shellfish, X-Ray Dye, Iodine. Medications: finasteride 5 mg tablet (finasteride) Take 1 tablet by mouth once a day   Flomax 0.4 mg capsule (tamsulosin) Take 1 capsule by mouth every night as directed at bedtime  sertraline 50 mg tablet (sertraline) Take 1 tablet by mouth once a day   levothyroxine 50 mcg tablet (levothyroxine) Take 1 tablet by mouth once a day   melatonin 10 mg capsule (melatonin) as directed   memantine 5 mg tablet (memantine) Take 1 tablet by mouth once a day   donepezil 5 mg tablet (donepezil) Take 1 tablet by mouth every night     Problems: UTI (ICD-599.0) (IJQ86-B78.0)  Urinary retention (ICD-788.20) (DZV42-M07.9)  BPH with obstruction (ICD-600.0) (BKJ80-D62.1)    Illnesses: High Blood Pressure and Kidney Problems. DENIES: Heart Disease, Pacemaker/Defibrillator, Lung Disease, Diabetes, Bowel Problems, Stroke/Seizure, Bleeding Problems, HIV, Hepatitis, or Cancer. Surgeries: Prostate Surgery, Hernia Repair, and Cataract Surgery. Family History: Kidney Disease. DENIES: Prostate cancer, Kidney cancer, Kidney stones. Social History: Retired. . Smoking status: Never. Does not drink alcohol. System Review: Admits to: Constipation, Difficulty Walking, Dry Skin, and Shortness of Breath. DENIES: Unexplained Weight Loss, Dry Eyes, Dry Mouth, Leg Swelling, Involuntary Urine Loss, Lower Extremity Weakness, Psychiatric Problems, Impaired Sex Drive, Easy Bleeding, Rash.      EXAMINATION: Appearance: well-developed NAD Conjunctiva/Lids: conjunctivae and lids normal Pupil/Iris: pupils equal, round External Ears/Nose: normal no lesions or deformities Hearing: grossly intact Neck: supple Thyroid: no enlargement Respiratory Effort: breathing easily Abd. Aorta: no enlargement Lower Extremity: no edema Abdomen/Flank: soft non-tender without masses; no CVA tenderness Liver/Spleen: no organomegaly Hernia: no ventral hernia Lymph: no adenopathy Gait/Station: normal Digits/Nails: no clubbing cyanosis petechiae Skin Inspection: warm and dry Skin Palpation: no SQ nodularity Sensation: no sensory deficits Judgment/Insight: intact Mood/Affect: normal       URINALYSIS  Urine Micro not done    IMPRESSION:    1. BPH WITH OBSTRUCTION (DLP28-B54.1)    2. UTI (TLC87-H56.0)    3. Urinary retention (QSL68-O02.9)    PLAN: I instructed the patient's family to try to think about their last urologist which was 10 to 15 years ago in [place]. If they can figure this out, please let us know, we can try to reach out to that group for records  Urine today from a catheterized specimen for culture  Urodynamics next available  30-day catheter exchanges in the interim  Urine culture 1 week prior to urodynamics from a catheterized specimen  Follow-up with me after urodynamics. Could consider outlet procedure under spinal anesthesia although I thoroughly discussed the purpose of urodynamics to assess bladder function. I am wary that the patient will be able to tolerate cystourethroscopy in the clinic although this may be helpful. Based on his history of BPH related surgery, at least provided by family, he may be able to forego cystourethroscopy. Would favor standard versus laser TURP if we proceed with intervention. Continue Flomax and finasteride for now. cc: Renetta Clay M.D.   Today's Services  E&M Service    Upcoming Orders  Return Office Visit - with Davina Greer MD MS  Next Available  UA, Urine Culture, Urodynamics w/ Video        Electronically signed by Davina Greer MD MS on 06/29/2021 at 2:38 PM    ________________________________________________________________________          Temp (24hrs), Av.8 °F (36.6 °C), Min:97 °F (36.1 °C), Max:98.7 °F (37.1 °C)    Urinary Status: Otra  Creatinine   Date/Time Value Ref Range Status   2021 02:59 PM 1.26 0.70 - 1.30 MG/DL Final   2021 09:40 PM 0.83 0.70 - 1.30 MG/DL Final   2021 04:21 AM 0.75 0.70 - 1.30 MG/DL Final   2021 11:06 PM 1.04 0.70 - 1.30 MG/DL Final   2021 12:01 AM 0.76 0.70 - 1.30 MG/DL Final     Current Antimicrobial Therapy (168h ago, onward)     Ordered     Start Stop    21  vancomycin (VANCOCIN) 750 mg in 0.9% sodium chloride 250 mL (VIAL-MATE)  750 mg,   IntraVENous,   EVERY 24 HOURS      21 --    21  Vancomycin- pharmacy to dose  Other,   RX DOSING/MONITORING      21 --    21  cefTRIAXone (ROCEPHIN) 1 g in 0.9% sodium chloride (MBP/ADV) 50 mL MBP  1 g,   IntraVENous,   EVERY 24 HOURS      21              Key Anti-Platelet Anticoagulant Meds     The patient is on no antiplatelet meds or anticoagulants.         Diet: ADULT DIET Regular -       Labs     Lab Results   Component Value Date/Time    Lactic acid 1.5 2021 05:35 PM    Lactic acid 4.2 (HH) 2021 02:59 PM    Lactic acid 1.0 2021 09:40 PM    WBC 6.4 2021 03:30 AM    HCT 41.4 2021 03:30 AM    PLATELET 920  03:30 AM    Sodium 142 2021 02:59 PM    Potassium 4.2 2021 02:59 PM    Chloride 106 2021 02:59 PM    CO2 23 2021 02:59 PM    BUN 41 (H) 2021 02:59 PM    Creatinine 1.26 2021 02:59 PM    Glucose 111 (H) 2021 02:59 PM    Calcium 9.9 2021 02:59 PM    Magnesium 2.2 2021 09:40 PM    INR 0.9 2013 11:50 AM     UA:   Lab Results   Component Value Date/Time    Color STRAW 2021 08:44 PM    Appearance TURBID (A) 2021 08:44 PM    Specific gravity 1.020 2021 08:44 PM    Specific gravity 1.030 2021 11:06 PM    pH (UA) 5.0 2021 08:44 PM    Protein 100 (A) 2021 08:44 PM    Glucose Negative 07/28/2021 08:44 PM    Ketone >80 (A) 07/28/2021 08:44 PM    Bilirubin Negative 07/28/2021 08:44 PM    Urobilinogen 0.2 07/28/2021 08:44 PM    Nitrites Negative 07/28/2021 08:44 PM    Leukocyte Esterase LARGE (A) 07/28/2021 08:44 PM    Epithelial cells FEW 07/28/2021 08:44 PM    Bacteria Negative 07/28/2021 08:44 PM    WBC 20-50 07/28/2021 08:44 PM    RBC 20-50 07/28/2021 08:44 PM     Imaging     Results for orders placed during the hospital encounter of 07/22/21    CT HEAD WO CONT    Narrative  CLINICAL HISTORY: Altered mental status  INDICATION: Altered mental status  COMPARISON: None. CT dose reduction was achieved through use of a standardized protocol tailored  for this examination and automatic exposure control for dose modulation. TECHNIQUE: Serial axial images with a collimation of 5 mm were obtained from the  skull base through the vertex  FINDINGS:  There is sulcal and ventricular prominence. Confluent periventricular and  scattered foci of hypodensity in the cerebral white matter. There is no evidence  of an acute infarction, hemorrhage, or mass-effect. There is no evidence of  midline shift or hydrocephalus. Posterior fossa structures are unremarkable. No  extra-axial collections are seen. Mastoid air cells are well pneumatized and clear. There is no evidence of depressed skull fractures of soft tissue swelling. Impression  No acute intracranial process. Imaging findings consistent with moderate chronic microvascular ischemic change. There is a severe degree of cerebral atrophy. US Results (most recent):  No results found for this or any previous visit.       Cultures     All Micro Results     Procedure Component Value Units Date/Time    CULTURE, BLOOD, PAIRED [[id number]] Collected: 07/28/21 1500    Order Status: Completed Specimen: Blood Updated: 07/29/21 0542     Special Requests: NO SPECIAL REQUESTS        Culture result: NO GROWTH AFTER 13 HOURS CULTURE, URINE [[id number]] Collected: 07/28/21 2044    Order Status: Completed Specimen: Cath Urine Updated: 07/28/21 2135           Past History: (Complete 2+/3 areas)   No Known Allergies   Current Facility-Administered Medications   Medication Dose Route Frequency    levothyroxine (SYNTHROID) tablet 50 mcg  50 mcg Oral ACB    sodium chloride (NS) flush 5-40 mL  5-40 mL IntraVENous PRN    acetaminophen (TYLENOL) tablet 650 mg  650 mg Oral Q6H PRN    Or    acetaminophen (TYLENOL) suppository 650 mg  650 mg Rectal Q6H PRN    enoxaparin (LOVENOX) injection 40 mg  40 mg SubCUTAneous DAILY    0.9% sodium chloride infusion  100 mL/hr IntraVENous CONTINUOUS    bisacodyL (DULCOLAX) suppository 10 mg  10 mg Rectal DAILY PRN    finasteride (PROSCAR) tablet 5 mg  5 mg Oral DAILY    sertraline (ZOLOFT) tablet 25 mg  25 mg Oral DAILY    tamsulosin (FLOMAX) capsule 0.4 mg  0.4 mg Oral DAILY    OLANZapine (ZyPREXA) 5 mg in sterile water (preservative free) 1 mL injection  5 mg IntraMUSCular Q8H PRN    cefTRIAXone (ROCEPHIN) 1 g in 0.9% sodium chloride (MBP/ADV) 50 mL MBP  1 g IntraVENous Q24H    famotidine (PF) (PEPCID) 20 mg in 0.9% sodium chloride 10 mL injection  20 mg IntraVENous DAILY    Vancomycin- pharmacy to dose   Other Rx Dosing/Monitoring    vancomycin (VANCOCIN) 750 mg in 0.9% sodium chloride 250 mL (VIAL-MATE)  750 mg IntraVENous Q24H    Prior to Admission medications    Medication Sig Start Date End Date Taking? Authorizing Provider   donepeziL (ARICEPT) 10 mg tablet Take 10 mg by mouth nightly. Indications: mild to moderate Alzheimer's type dementia, aggitation   Yes Provider, Historical   levothyroxine (SYNTHROID) 50 mcg tablet Take 50 mcg by mouth Daily (before breakfast). Yes Provider, Historical   LORazepam (INTENSOL) 2 mg/mL concentrated solution Take 1 mg by mouth every four (4) hours as needed for Agitation.  7/24/21  Yes Provider, Historical   memantine (NAMENDA) 10 mg tablet Take 10 mg by mouth two (2) times a day. Indications: moderate to severe Alzheimer's type dementia   Yes Provider, Historical   QUEtiapine (SEROqueL) 50 mg tablet Take 50 mg by mouth two (2) times a day. Indications: dementia   Yes Provider, Historical   sertraline (ZOLOFT) 25 mg tablet Take 25 mg by mouth daily. Indications: major depressive disorder   Yes Provider, Historical   ciprofloxacin (Cipro) 500 mg/5 mL suspension Take 500 mg by mouth two (2) times a day. 7/27/21  Yes Provider, Historical   tamsulosin (FLOMAX) 0.4 mg capsule Take 1 Cap by mouth daily. 4/29/21  Yes Desiree Mccrary MD        PMHx:  has a past medical history of Arthritis, Asthma, Benign prostate hyperplasia, Dementia ([address] Hypothyroidism, and Spinal stenosis of lumbar region with radiculopathy (6/26/2013). PSurgHx:  has a past surgical history that includes hx other surgical and hx tonsil and adenoidectomy. PSocHx:  reports that he has never smoked. He has never used smokeless tobacco. He reports current alcohol use. He reports that he does not use drugs.    ROS:  (Complete - 10 systems) - DENIES: Weightloss (Constitutional), Dry mouth (ENMT), Chest pain (CV), SOB (Respiratory), Constipation (GI), Weakness (MS), Pallor (Skin), TIA Sx (Neuro), Confusion (Psych), Easy bruising (Heme)    Physical Exam: (Comprehesive - 8+ 1995 Systems)     (1) Constitutional:  FIO2:   on SpO2: O2 Sat (%): 96 %  O2 Device: None (Room air)    Patient Vitals for the past 24 hrs:   BP Temp Pulse Resp SpO2 Height Weight   07/29/21 0903 (!) 151/88 97.8 °F (36.6 °C) 80 16 96 %     07/29/21 0451 (!) 140/82 98 °F (36.7 °C) 95 16 96 %     07/28/21 2348 (!) 154/98 97 °F (36.1 °C) 97 18 96 %     07/28/21 2157 138/78 97.5 °F (36.4 °C) (!) 118 18 96 %     07/28/21 2151      5' 7\" (1.702 m) 55.8 kg (123 lb 0.3 oz)   07/28/21 2107 (!) 173/84  (!) 114 19 97 %     07/28/21 2001 (!) 144/93  (!) 108 17 100 %     07/28/21 1600 103/70  (!) 104 26 96 %     07/28/21 1424 (!) 148/73 98.7 °F (37.1 °C) 87 20 98 %         Date 07/28/21 0700 - 07/29/21 0659 07/29/21 0700 - 07/30/21 0659   Shift [id number] 1900-0659 24 Hour Total 8699-8805 4957-2731 24 Hour Total   INTAKE   I.V.  1250(1.9) 1250(0.9)        Volume (sodium chloride 0.9 % bolus infusion 1,000 mL)  1000 1000        Volume (0.9% sodium chloride infusion)  0 0        Volume (vancomycin (VANCOCIN) 1250 mg in  ml infusion)  250 250        Volume (cefTRIAXone (ROCEPHIN) 1 g in 0.9% sodium chloride (MBP/ADV) 50 mL MBP)  0 0      Shift Total(mL/kg)  9215(40.8) 4812(99.9)      OUTPUT   Urine  100(0.1) 100(0.1)        Urine Output (mL) (Urinary Catheter 07/28/21 Orta)  100 100      Shift Total(mL/kg)  100(1.8) 100(1.8)      NET  1150 1150      Weight (kg)  55.8 55.8 55.8 55.8 55.8                                                         Signed By: Carson Stallings NP  - July 29, 2021

## 2021-07-29 NOTE — CONSULTS
Restart aricept and namenda at current dosing, restart seroquel but on a lower dose 25 mg tid. I wont be surprised if he refuses to take them, since he's been refusing meds. Transfer to [facility] once medically cleared. He has to be evaluated by [facility] for tdo eval, since he has no capacity to voluntary consent for treatment. Continue sitter. Full consult dictated.

## 2021-07-29 NOTE — PROGRESS NOTES
Admission Medication Reconciliation:    Information obtained from: \[facility] [place] [facility]\"" medication list updated with information provided by [facility] dated 7/28/21. Significant PMH/Disease States:   Past Medical History:   Diagnosis Date    Arthritis     Asthma     Benign prostate hyperplasia     Dementia ([address]     Hypothyroidism     Spinal stenosis of lumbar region with radiculopathy 6/26/2013       Allergies:  Patient has no known allergies. Prior to Admission Medications:   Prior to Admission Medications   Prescriptions Last Dose Informant Patient Reported? Taking? LORazepam (INTENSOL) 2 mg/mL concentrated solution 7/28/2021  Yes Yes   Sig: Take 1 mg by mouth every four (4) hours as needed for Agitation. QUEtiapine (SEROqueL) 50 mg tablet 7/27/2021  Yes Yes   Sig: Take 50 mg by mouth two (2) times a day. Indications: dementia   ciprofloxacin (Cipro) 500 mg/5 mL suspension 7/28/2021 at AM  Yes Yes   Sig: Take 500 mg by mouth two (2) times a day. donepeziL (ARICEPT) 10 mg tablet 7/27/2021  Yes Yes   Sig: Take 10 mg by mouth nightly. Indications: mild to moderate Alzheimer's type dementia, aggitation   levothyroxine (SYNTHROID) 50 mcg tablet 7/28/2021 at am  Yes Yes   Sig: Take 50 mcg by mouth Daily (before breakfast). memantine (NAMENDA) 10 mg tablet 7/28/2021 at am  Yes Yes   Sig: Take 10 mg by mouth two (2) times a day. Indications: moderate to severe Alzheimer's type dementia   sertraline (ZOLOFT) 25 mg tablet 7/28/2021  Yes Yes   Sig: Take 25 mg by mouth daily. Indications: major depressive disorder   tamsulosin (FLOMAX) 0.4 mg capsule 7/28/2021  No Yes   Sig: Take 1 Cap by mouth daily. Facility-Administered Medications: None     Thank you for allowing me to participate in the care of this patient. Please contact the pharmacy at  or the medication reconciliation pharmacist at  with any questions. Kiana Dodd, PharmNorah D., BCPS, BCPPS

## 2021-07-29 NOTE — CONSULTS
Requesting Provider: Luanne Kwok MD - Reason for Consultation: \"chronic rodriguez, hx of BPH\"  Pre-existing [facility] Patient:   No                Patient: Min Dill MRN: [id number]  SSN: [id number]    YOB: 1933  Age: 80 y.o. Sex: male     Location: [facility]       Code Status: Full Code   PCP: Other, MD Anna  - None   Emergency Contact:  Primary Emergency Contact: Liz Adkins, [place] Phone: [phone]   Race/Mormonism/Language: [address] [place] [address],[address] / Tessy Alfaro / Joshua Hidden   Payor: Payor: Rafi Fraire / Plan: Ankit Arevalo / Product Type: Medicare /    Prior Admission Data: 7/26/21 [facility]     Hospitalized:  Hospital Day: 2 - Admitted 7/28/2021  2:03 PM   POD # * No surgery found *  by * Surgery not found * - Blood Loss: * No surgery found * * Surgery not found *     CONSULTANTS  ED CONSULT TO [address]  IP CONSULT TO PSYCHIATRY  IP CONSULT TO [address] [place] [address]    ICD-10-CM ICD-9-CM   1. Urinary tract infection without hematuria, site unspecified  N39.0 599.0         Assessment/Plan:       Hx of BPH and chronic rodriguez    - scheduled for a TURP on 08/11/21 at 1pm with Dr. Octavio Belle at [facility]  - maintain rodriguez, please ensure protection of rodriguez  - follow urine cultures, tailor abx therapy if warranted  - continue daily flomax    Supervising MD, Dr. Katina Kim     CC: Aggressive behavior   HPI: 80yoM. Per chart review, PMH of longstanding history of dementia of Alzheimer's type. Upon arrival to the [facility] ER was extremely combative and agitated, received Geodon and some Ativan after which he became less combative and manageable. [facility] was consulted regarding a chronic rodriguez with hx of BPH. Rodriguez placed 07/28/21. Patient was last seen by Dr. Octavio Belle on 06/29/21. Last OV note below. Patient has a scheduled TURP on 08/11/21. Assessment limited d/t hx of dementia. Patient with a sitter present in room.  Rodriguez in place draining clear yellow UA. No acute distress noted. afvss  WBC, Hgb: wnl  Bcx: no growth  Cr: 1.26, 07/28/21  UA: 20-50 RBCs, 20-50 WBCs, no bacteria, few CA oxalate crystals  Ucx: pending  +rocephin, vanc  +flomax    06/18/21  CT AP wo:  IMPRESSION  Circumferential urinary bladder wall prominence and subtle stranding  of the adjacent fat. Findings may represent cystitis.    ===last OV note below===    This is a 80-year-old male that presents as a second opinion visit. He was seen last in our office by Dr. Jing Hinson on 6/16/2021 for history of urinary retention who initially passed a void trial but came back with a bladder scan over a liter. They had a discussion to move forward with the laser procedure for BPH/bladder outlet. The catheter was left out. CT [facility] showed a 40 to 50 g prostate (~42g on my read) with significant bladder distention and mild bilateral hydronephrosis. On finasteride as well as Flomax. PSA 0.874 on 6/9/2021. History of dementia, arthritis, BPH, hypothyroidism. Poor historian. In the ER on 6/16 for Orta replacement and then to [facility] 6/17/2021 from assisted living facility for fever and lethargy. Readmitted. Presented with fever 102.4. Creatinine 1.04. CT of the abdomen and pelvis without contrast 6/18/2021 showed bladder wall thickening with a Orta catheter in place. Urine culture no growth. Seen as an inpatient consult by Jeremiah Soria NP with Dr. Velásquez supervising. Orta catheter kept in place. Was initially scheduled for TURP procedure 7/6/2021. We received a call on 6/20 that the patient had pulled out his own Orta catheter that his facility but after discussion it seems like he tripped on the catheter and it accidentally pulled out. He is accompanied today by his wife, Matthew Leach. His son, Dena Almaguer, joins on the phone via speaker. His caregiver, T, is also in the room.   His wife tells me that the patient had prostate surgery (confirms not prostate cancer related) about 10 to 15 years ago in [place]. Recently has decreased appetite, in bed. Thiago Hopkins over the weekend playing corn hole. The patient himself provides no history today. PAST MEDICAL HISTORY:    Allergies: No known allergies. DENIES: Latex, Shellfish, X-Ray Dye, Iodine. Medications: finasteride 5 mg tablet (finasteride) Take 1 tablet by mouth once a day   Flomax 0.4 mg capsule (tamsulosin) Take 1 capsule by mouth every night as directed at bedtime  sertraline 50 mg tablet (sertraline) Take 1 tablet by mouth once a day   levothyroxine 50 mcg tablet (levothyroxine) Take 1 tablet by mouth once a day   melatonin 10 mg capsule (melatonin) as directed   memantine 5 mg tablet (memantine) Take 1 tablet by mouth once a day   donepezil 5 mg tablet (donepezil) Take 1 tablet by mouth every night     Problems: UTI (ICD-599.0) (NNU07-B12.0)  Urinary retention (ICD-788.20) (ZQL57-T14.9)  BPH with obstruction (ICD-600.0) (YKP31-W14.1)    Illnesses: High Blood Pressure and Kidney Problems. DENIES: Heart Disease, Pacemaker/Defibrillator, Lung Disease, Diabetes, Bowel Problems, Stroke/Seizure, Bleeding Problems, HIV, Hepatitis, or Cancer. Surgeries: Prostate Surgery, Hernia Repair, and Cataract Surgery. Family History: Kidney Disease. DENIES: Prostate cancer, Kidney cancer, Kidney stones. Social History: Retired. . Smoking status: Never. Does not drink alcohol. System Review: Admits to: Constipation, Difficulty Walking, Dry Skin, and Shortness of Breath. DENIES: Unexplained Weight Loss, Dry Eyes, Dry Mouth, Leg Swelling, Involuntary Urine Loss, Lower Extremity Weakness, Psychiatric Problems, Impaired Sex Drive, Easy Bleeding, Rash.      EXAMINATION: Appearance: well-developed NAD Conjunctiva/Lids: conjunctivae and lids normal Pupil/Iris: pupils equal, round External Ears/Nose: normal no lesions or deformities Hearing: grossly intact Neck: supple Thyroid: no enlargement Respiratory Effort: breathing easily Abd. Aorta: no enlargement Lower Extremity: no edema Abdomen/Flank: soft non-tender without masses; no CVA tenderness Liver/Spleen: no organomegaly Hernia: no ventral hernia Lymph: no adenopathy Gait/Station: normal Digits/Nails: no clubbing cyanosis petechiae Skin Inspection: warm and dry Skin Palpation: no SQ nodularity Sensation: no sensory deficits Judgment/Insight: intact Mood/Affect: normal       URINALYSIS  Urine Micro not done    IMPRESSION:    1. BPH WITH OBSTRUCTION (XXH87-V48.1)    2. UTI (HYZ78-M73.0)    3. Urinary retention (UUB98-H05.9)    PLAN: I instructed the patient's family to try to think about their last urologist which was 10 to 15 years ago in [place]. If they can figure this out, please let us know, we can try to reach out to that group for records  Urine today from a catheterized specimen for culture  Urodynamics next available  30-day catheter exchanges in the interim  Urine culture 1 week prior to urodynamics from a catheterized specimen  Follow-up with me after urodynamics. Could consider outlet procedure under spinal anesthesia although I thoroughly discussed the purpose of urodynamics to assess bladder function. I am wary that the patient will be able to tolerate cystourethroscopy in the clinic although this may be helpful. Based on his history of BPH related surgery, at least provided by family, he may be able to forego cystourethroscopy. Would favor standard versus laser TURP if we proceed with intervention. Continue Flomax and finasteride for now. cc: Gisell Miller M.D.   Today's Services  E&M Service    Upcoming Orders  Return Office Visit - with Mona Osborne MD MS  Next Available  UA, Urine Culture, Urodynamics w/ Video        Electronically signed by Mona Osborne MD MS on 06/29/2021 at 2:38 PM    ________________________________________________________________________          Temp (24hrs), Av.8 °F (36.6 °C), Min:97 °F (36.1 °C), Max:98.7 °F (37.1 °C)    Urinary Status: Orta  Creatinine   Date/Time Value Ref Range Status   2021 02:59 PM 1.26 0.70 - 1.30 MG/DL Final   2021 09:40 PM 0.83 0.70 - 1.30 MG/DL Final   2021 04:21 AM 0.75 0.70 - 1.30 MG/DL Final   2021 11:06 PM 1.04 0.70 - 1.30 MG/DL Final   2021 12:01 AM 0.76 0.70 - 1.30 MG/DL Final     Current Antimicrobial Therapy (168h ago, onward)     Ordered     Start Stop    21  vancomycin (VANCOCIN) 750 mg in 0.9% sodium chloride 250 mL (VIAL-MATE)  750 mg,   IntraVENous,   EVERY 24 HOURS      21 --    21  Vancomycin- pharmacy to dose  Other,   RX DOSING/MONITORING      21 --    21  cefTRIAXone (ROCEPHIN) 1 g in 0.9% sodium chloride (MBP/ADV) 50 mL MBP  1 g,   IntraVENous,   EVERY 24 HOURS      21 005              Key Anti-Platelet Anticoagulant Meds     The patient is on no antiplatelet meds or anticoagulants.         Diet: ADULT DIET Regular -       Labs     Lab Results   Component Value Date/Time    Lactic acid 1.5 2021 05:35 PM    Lactic acid 4.2 (HH) 2021 02:59 PM    Lactic acid 1.0 2021 09:40 PM    WBC 6.4 2021 03:30 AM    HCT 41.4 2021 03:30 AM    PLATELET 838  03:30 AM    Sodium 142 2021 02:59 PM    Potassium 4.2 2021 02:59 PM    Chloride 106 2021 02:59 PM    CO2 23 2021 02:59 PM    BUN 41 (H) 2021 02:59 PM    Creatinine 1.26 2021 02:59 PM    Glucose 111 (H) 2021 02:59 PM    Calcium 9.9 2021 02:59 PM    Magnesium 2.2 2021 09:40 PM    INR 0.9 2013 11:50 AM     UA:   Lab Results   Component Value Date/Time    Color STRAW 2021 08:44 PM    Appearance TURBID (A) 2021 08:44 PM    Specific gravity 1.020 2021 08:44 PM    Specific gravity 1.030 2021 11:06 PM    pH (UA) 5.0 2021 08:44 PM    Protein 100 (A) 2021 08:44 PM    Glucose Negative 07/28/2021 08:44 PM    Ketone >80 (A) 07/28/2021 08:44 PM    Bilirubin Negative 07/28/2021 08:44 PM    Urobilinogen 0.2 07/28/2021 08:44 PM    Nitrites Negative 07/28/2021 08:44 PM    Leukocyte Esterase LARGE (A) 07/28/2021 08:44 PM    Epithelial cells FEW 07/28/2021 08:44 PM    Bacteria Negative 07/28/2021 08:44 PM    WBC 20-50 07/28/2021 08:44 PM    RBC 20-50 07/28/2021 08:44 PM     Imaging     Results for orders placed during the hospital encounter of 07/22/21    CT HEAD WO CONT    Narrative  CLINICAL HISTORY: Altered mental status  INDICATION: Altered mental status  COMPARISON: None. CT dose reduction was achieved through use of a standardized protocol tailored  for this examination and automatic exposure control for dose modulation. TECHNIQUE: Serial axial images with a collimation of 5 mm were obtained from the  skull base through the vertex  FINDINGS:  There is sulcal and ventricular prominence. Confluent periventricular and  scattered foci of hypodensity in the cerebral white matter. There is no evidence  of an acute infarction, hemorrhage, or mass-effect. There is no evidence of  midline shift or hydrocephalus. Posterior fossa structures are unremarkable. No  extra-axial collections are seen. Mastoid air cells are well pneumatized and clear. There is no evidence of depressed skull fractures of soft tissue swelling. Impression  No acute intracranial process. Imaging findings consistent with moderate chronic microvascular ischemic change. There is a severe degree of cerebral atrophy. US Results (most recent):  No results found for this or any previous visit.       Cultures     All Micro Results     Procedure Component Value Units Date/Time    CULTURE, BLOOD, PAIRED [[id number]] Collected: 07/28/21 1500    Order Status: Completed Specimen: Blood Updated: 07/29/21 0542     Special Requests: NO SPECIAL REQUESTS        Culture result: NO GROWTH AFTER 13 HOURS CULTURE, URINE [[id number]] Collected: 07/28/21 2044    Order Status: Completed Specimen: Cath Urine Updated: 07/28/21 2135           Past History: (Complete 2+/3 areas)   No Known Allergies   Current Facility-Administered Medications   Medication Dose Route Frequency    levothyroxine (SYNTHROID) tablet 50 mcg  50 mcg Oral ACB    sodium chloride (NS) flush 5-40 mL  5-40 mL IntraVENous PRN    acetaminophen (TYLENOL) tablet 650 mg  650 mg Oral Q6H PRN    Or    acetaminophen (TYLENOL) suppository 650 mg  650 mg Rectal Q6H PRN    enoxaparin (LOVENOX) injection 40 mg  40 mg SubCUTAneous DAILY    0.9% sodium chloride infusion  100 mL/hr IntraVENous CONTINUOUS    bisacodyL (DULCOLAX) suppository 10 mg  10 mg Rectal DAILY PRN    finasteride (PROSCAR) tablet 5 mg  5 mg Oral DAILY    sertraline (ZOLOFT) tablet 25 mg  25 mg Oral DAILY    tamsulosin (FLOMAX) capsule 0.4 mg  0.4 mg Oral DAILY    OLANZapine (ZyPREXA) 5 mg in sterile water (preservative free) 1 mL injection  5 mg IntraMUSCular Q8H PRN    cefTRIAXone (ROCEPHIN) 1 g in 0.9% sodium chloride (MBP/ADV) 50 mL MBP  1 g IntraVENous Q24H    famotidine (PF) (PEPCID) 20 mg in 0.9% sodium chloride 10 mL injection  20 mg IntraVENous DAILY    Vancomycin- pharmacy to dose   Other Rx Dosing/Monitoring    vancomycin (VANCOCIN) 750 mg in 0.9% sodium chloride 250 mL (VIAL-MATE)  750 mg IntraVENous Q24H    Prior to Admission medications    Medication Sig Start Date End Date Taking? Authorizing Provider   donepeziL (ARICEPT) 10 mg tablet Take 10 mg by mouth nightly. Indications: mild to moderate Alzheimer's type dementia, aggitation   Yes Provider, Historical   levothyroxine (SYNTHROID) 50 mcg tablet Take 50 mcg by mouth Daily (before breakfast). Yes Provider, Historical   LORazepam (INTENSOL) 2 mg/mL concentrated solution Take 1 mg by mouth every four (4) hours as needed for Agitation.  7/24/21  Yes Provider, Historical   memantine (NAMENDA) 10 mg tablet Take 10 mg by mouth two (2) times a day. Indications: moderate to severe Alzheimer's type dementia   Yes Provider, Historical   QUEtiapine (SEROqueL) 50 mg tablet Take 50 mg by mouth two (2) times a day. Indications: dementia   Yes Provider, Historical   sertraline (ZOLOFT) 25 mg tablet Take 25 mg by mouth daily. Indications: major depressive disorder   Yes Provider, Historical   ciprofloxacin (Cipro) 500 mg/5 mL suspension Take 500 mg by mouth two (2) times a day. 7/27/21  Yes Provider, Historical   tamsulosin (FLOMAX) 0.4 mg capsule Take 1 Cap by mouth daily. 4/29/21  Yes Latisha Brice MD        PMHx:  has a past medical history of Arthritis, Asthma, Benign prostate hyperplasia, Dementia ([address] Hypothyroidism, and Spinal stenosis of lumbar region with radiculopathy (6/26/2013). PSurgHx:  has a past surgical history that includes hx other surgical and hx tonsil and adenoidectomy. PSocHx:  reports that he has never smoked. He has never used smokeless tobacco. He reports current alcohol use. He reports that he does not use drugs.    ROS:  (Complete - 10 systems) - DENIES: Weightloss (Constitutional), Dry mouth (ENMT), Chest pain (CV), SOB (Respiratory), Constipation (GI), Weakness (MS), Pallor (Skin), TIA Sx (Neuro), Confusion (Psych), Easy bruising (Heme)    Physical Exam: (Comprehesive - 8+ 1995 Systems)     (1) Constitutional:  FIO2:   on SpO2: O2 Sat (%): 96 %  O2 Device: None (Room air)    Patient Vitals for the past 24 hrs:   BP Temp Pulse Resp SpO2 Height Weight   07/29/21 0903 (!) 151/88 97.8 °F (36.6 °C) 80 16 96 %     07/29/21 0451 (!) 140/82 98 °F (36.7 °C) 95 16 96 %     07/28/21 2348 (!) 154/98 97 °F (36.1 °C) 97 18 96 %     07/28/21 2157 138/78 97.5 °F (36.4 °C) (!) 118 18 96 %     07/28/21 2151      5' 7\" (1.702 m) 55.8 kg (123 lb 0.3 oz)   07/28/21 2107 (!) 173/84  (!) 114 19 97 %     07/28/21 2001 (!) 144/93  (!) 108 17 100 %     07/28/21 1600 103/70  (!) 104 26 96 %     07/28/21 1424 (!) 148/73 98.7 °F (37.1 °C) 87 20 98 %         Date 07/28/21 0700 - 07/29/21 0659 07/29/21 0700 - 07/30/21 0659   Shift [id number] 1900-0659 24 Hour Total 9907-8170 1942-7655 24 Hour Total   INTAKE   I.V.  1250(1.9) 1250(0.9)        Volume (sodium chloride 0.9 % bolus infusion 1,000 mL)  1000 1000        Volume (0.9% sodium chloride infusion)  0 0        Volume (vancomycin (VANCOCIN) 1250 mg in  ml infusion)  250 250        Volume (cefTRIAXone (ROCEPHIN) 1 g in 0.9% sodium chloride (MBP/ADV) 50 mL MBP)  0 0      Shift Total(mL/kg)  3939(25.2) 9291(44.8)      OUTPUT   Urine  100(0.1) 100(0.1)        Urine Output (mL) (Urinary Catheter 07/28/21 Orta)  100 100      Shift Total(mL/kg)  100(1.8) 100(1.8)      NET  1150 1150      Weight (kg)  55.8 55.8 55.8 55.8 55.8                                                         Signed By: Bennett Singh NP  - July 29, 2021

## 2021-07-29 NOTE — PROGRESS NOTES
Care Management Interventions  PCP Verified by CM: Yes  Palliative Care Criteria Met (RRAT>21 & CHF Dx)?: No  Mode of Transport at Discharge:  (tbd)  Transition of Care Consult (CM Consult):  ([facility] assisted living in [facility])  Devonfrederickjanki Signup: No  Discharge Durable Medical Equipment: No  Physical Therapy Consult: No  Occupational Therapy Consult: No  Current Support Network:  (lives with wife at facililty)  Confirm Follow Up Transport:  (tbd most likely stretcher )  [facility]  Discharge Location  Discharge Placement:  (tbd)   CRM notes that patient had been at the ED at [address] [place] [address] one week ago  He currently resides at [address] [place] [address], 29 living facility at [place] [address] and the main phone is [phone]. Patient resides at above assisted living with his wife. He has been here since the spring of this year and has a chronic rodriguez in place. I spoke with son Kristel who lives here in the [facility] and he and his brother Charo Rodriguez both have POA for parents. Charo Rodriguez resides in [place]. Prior to patient going to assisted living he had lived at home and and sons hired private aides for both him and his wife. He and his brother Charo Rodriguez have POA for their parents. Over the past week per son Kristel states that the assisted living facility noted an increase in patient's agitation and behavior. They asked son to have him evaluated in ED last week and the MD changed his medications and sent him back to A/L. Son Abby Eric lost his private aide for dad as assisted living hired her. Patient was tentatively scheduled for a TURP in mid August at [facility] as urology felt that this was contributing to his continual incontinence. Sons spoke with hospitalist the pm and were given updates on their dad. Care management will follow for transitions of care needs.

## 2021-07-30 LAB
ANION GAP SERPL CALC-SCNC: 10 MMOL/L (ref 5–15)
BUN SERPL-MCNC: 15 MG/DL (ref 6–20)
BUN/CREAT SERPL: 23 (ref 12–20)
CALCIUM SERPL-MCNC: 8.9 MG/DL (ref 8.5–10.1)
CHLORIDE SERPL-SCNC: 114 MMOL/L (ref 97–108)
CO2 SERPL-SCNC: 18 MMOL/L (ref 21–32)
CREAT SERPL-MCNC: 0.64 MG/DL (ref 0.7–1.3)
GLUCOSE SERPL-MCNC: 118 MG/DL (ref 65–100)
POTASSIUM SERPL-SCNC: 3.7 MMOL/L (ref 3.5–5.1)
SODIUM SERPL-SCNC: 142 MMOL/L (ref 136–145)

## 2021-07-30 PROCEDURE — 74011250636 HC RX REV CODE- 250/636: Performed by: FAMILY MEDICINE

## 2021-07-30 PROCEDURE — 74011250637 HC RX REV CODE- 250/637: Performed by: FAMILY MEDICINE

## 2021-07-30 PROCEDURE — 74011250636 HC RX REV CODE- 250/636: Performed by: INTERNAL MEDICINE

## 2021-07-30 PROCEDURE — 36415 COLL VENOUS BLD VENIPUNCTURE: CPT

## 2021-07-30 PROCEDURE — 74011000258 HC RX REV CODE- 258: Performed by: FAMILY MEDICINE

## 2021-07-30 PROCEDURE — 74011000250 HC RX REV CODE- 250: Performed by: INTERNAL MEDICINE

## 2021-07-30 PROCEDURE — 74011250636 HC RX REV CODE- 250/636: Performed by: NURSE PRACTITIONER

## 2021-07-30 PROCEDURE — 74011000250 HC RX REV CODE- 250: Performed by: FAMILY MEDICINE

## 2021-07-30 PROCEDURE — 74011250637 HC RX REV CODE- 250/637: Performed by: INTERNAL MEDICINE

## 2021-07-30 PROCEDURE — 80048 BASIC METABOLIC PNL TOTAL CA: CPT

## 2021-07-30 PROCEDURE — 65210000002 HC RM PRIVATE GYN

## 2021-07-30 RX ORDER — RIVASTIGMINE 9.5 MG/24H
1 PATCH, EXTENDED RELEASE TRANSDERMAL DAILY
Status: DISCONTINUED | OUTPATIENT
Start: 2021-07-31 | End: 2021-08-13 | Stop reason: HOSPADM

## 2021-07-30 RX ADMIN — HALOPERIDOL LACTATE 2 MG: 5 INJECTION, SOLUTION INTRAMUSCULAR at 16:31

## 2021-07-30 RX ADMIN — MEROPENEM 500 MG: 500 INJECTION, POWDER, FOR SOLUTION INTRAVENOUS at 15:35

## 2021-07-30 RX ADMIN — MEROPENEM 500 MG: 500 INJECTION, POWDER, FOR SOLUTION INTRAVENOUS at 07:19

## 2021-07-30 RX ADMIN — FAMOTIDINE 20 MG: 10 INJECTION, SOLUTION INTRAVENOUS at 09:07

## 2021-07-30 RX ADMIN — VANCOMYCIN HYDROCHLORIDE 1000 MG: 1 INJECTION, POWDER, LYOPHILIZED, FOR SOLUTION INTRAVENOUS at 09:07

## 2021-07-30 RX ADMIN — DEXTROSE MONOHYDRATE 75 ML/HR: 50 INJECTION, SOLUTION INTRAVENOUS at 14:47

## 2021-07-30 RX ADMIN — MEROPENEM 500 MG: 500 INJECTION, POWDER, FOR SOLUTION INTRAVENOUS at 21:01

## 2021-07-30 RX ADMIN — ZIPRASIDONE MESYLATE 10 MG: 20 INJECTION, POWDER, LYOPHILIZED, FOR SOLUTION INTRAMUSCULAR at 21:50

## 2021-07-30 RX ADMIN — Medication 10 ML: at 15:40

## 2021-07-30 NOTE — PROGRESS NOTES
note:    JERO: Possible Inpt psyc     Patient is from [facility] - patient has a sitter and is with soft wrist restratnts - unable to seek placement with these barriers in place. When paitient is medically stable as per BSmart note staff will need to contact [address] [place] [address] at [phone].

## 2021-07-30 NOTE — PROGRESS NOTES
Bedside and Verbal shift change report given to Payton Robles RN (oncoming nurse) by Fatuma Thompson RN (offgoing nurse). Report included the following information SBAR, Kardex, ED Summary, MAR, Recent Results and Cardiac Rhythm .

## 2021-07-30 NOTE — PROGRESS NOTES
Comprehensive Nutrition Assessment    Type and Reason for Visit: Initial    Nutrition Recommendations/Plan:    1. Continue Regular diet, RD to add Ensure Clear (apple) and Enlive (chocolate) once daily each. 2. Encourage PO intakes, assistance as needed. Nutrition Assessment:    Past Medical History:   Diagnosis Date    Arthritis     Asthma     Benign prostate hyperplasia     Dementia ([facility] [address]     Hypothyroidism     Spinal stenosis of lumbar region with radiculopathy 6/26/2013     Pt with longstanding hx Alzheimer's, resides in a memory care facility, chronic UTIs with indwelling rodriguez cath. Admitted to ER of [facility] for labs, combative and agitated. Pt found to have elevated lactic acidosis and dehydration, admitted and started on IVFs. Dextrose currently providing ~300 kcals. Nutrition review for pt with low body weight/BMI. Unable to obtain hx from pt, chart review of weights indicated severe 22% wt loss over the past 8 months. RD to add daily supplements as additional kcal/protein options for patient and continue to monitor progress. Presently labs unremarkable from nutrition perspective. Malnutrition Assessment:  Malnutrition Status: Moderate malnutrition    Context:  Acute illness     Findings of the 6 clinical characteristics of malnutrition:   Energy Intake:  7 - 50% or less of est energy requirements for 5 or more days  Weight Loss:  7.00 - Greater than 7.5% over 3 months     Body Fat Loss:  1 - Mild body fat loss, Triceps, Buccal region   Muscle Mass Loss:  1 - Mild muscle mass loss, Clavicles (pectoralis & deltoids), Temples (temporalis)  Fluid Accumulation:  No significant fluid accumulation,     Strength:  Not performed         Nutritionally Significant Medications: Pepcid, Synthroid (refused), Merrem, Vanc    Estimated Daily Nutrient Needs:  Energy (kcal): 1630 (MSJ x 1.2 + 250 kcal); Weight Used for Energy Requirements: Current  Protein (g): 50-60 (1-1.2 g/kg);  Weight Used for Protein Requirements: Current  Fluid (ml/day): ~1600; Method Used for Fluid Requirements: 1 ml/kcal    Nutrition Related Findings:       BM: Abd WDL  Edema: None noted  Wounds:        Recent Labs     07/30/21  0502 07/29/21  1233 07/28/21  1459   * 106* 111*   BUN 15 22* 41*   CREA 0.64* 0.68* 1.26    145 142   K 3.7 4.1 4.2   * 114* 106   CO2 18* 19* 23   CA 8.9 8.9 9.9     Recent Labs     07/28/21  1459   ALT 31   AP 89   TBILI 1.5*   TP 7.8   ALB 4.4   GLOB 3.4     Recent Labs     07/28/21  1735 07/28/21  1459   LAC 1.5 4.2*     Recent Labs     07/29/21  0330 07/28/21  1459   WBC 6.4 9.8   HGB 13.9 14.2   HCT 41.4 42.7    283       Current Nutrition Therapies:   Diet: Regular  Supplements: None presently ordered  Additional Caloric Sources: D5    Meal intake:   Patient Vitals for the past 168 hrs:   % Diet Eaten   07/29/21 1404 1 - 25%     Supplement Intake: No data found.     Anthropometric Measures:  · Height:  5' 7\" (170.2 cm)  · Current Body Wt:  52.1 kg (114 lb 13.8 oz)   · Admission Body Wt:  114 lb 13.8 oz    · Ideal Body Wt:  148:  77.6 %   · BMI Categories:  Underweight (BMI less than 22) age over 72     Wt Readings from Last 10 Encounters:   07/30/21 52.1 kg (114 lb 13.8 oz)   07/26/21 60.8 kg (134 lb 0.6 oz)   06/17/21 67.2 kg (148 lb 2.4 oz)   04/24/21 67.2 kg (148 lb 2.4 oz)   01/07/21 66.9 kg (147 lb 7.8 oz)   06/27/13 63.7 kg (140 lb 7 oz)   06/12/13 64 kg (141 lb)       Nutrition Diagnosis:   · Underweight related to inadequate protein-energy intake as evidenced by weight loss, BMI      Nutrition Interventions:   Food and/or Nutrient Delivery: Continue current diet, Start oral nutrition supplement  Nutrition Education and Counseling: No recommendations at this time  Coordination of Nutrition Care: Continue to monitor while inpatient    Goals:  PO intakes >50% meals + 100% daily ONS       Nutrition Monitoring and Evaluation:   Behavioral-Environmental Outcomes: None identified  Food/Nutrient Intake Outcomes: Food and nutrient intake, Supplement intake  Physical Signs/Symptoms Outcomes: Biochemical data, Weight, Meal time behavior    Discharge Planning:    Continue oral nutrition supplement     Simona El RD     Contact via [employer]

## 2021-07-30 NOTE — PROGRESS NOTES
Bedside and Verbal shift change report given to KARTIK Brock (oncoming nurse) by Stephanie Rey (offgoing nurse). Report included the following information SBAR, Kardex, ED Summary, Procedure Summary, Intake/Output, MAR, Recent Results and Cardiac Rhythm AV paced.

## 2021-07-30 NOTE — PROGRESS NOTES
Problem: Falls - Risk of  Goal: *Absence of Falls  Description: Document Alka Cordova Fall Risk and appropriate interventions in the flowsheet. Outcome: Progressing Towards Goal  Note: Fall Risk Interventions:       Mentation Interventions: Adequate sleep, hydration, pain control, Bed/chair exit alarm, Increase mobility, Reorient patient, Toileting rounds, Update white board    Medication Interventions: Bed/chair exit alarm, Patient to call before getting OOB, Evaluate medications/consider consulting pharmacy    Elimination Interventions: Bed/chair exit alarm, Call light in reach, Toileting schedule/hourly rounds              Problem: Pressure Injury - Risk of  Goal: *Prevention of pressure injury  Description: Document Delfino Scale and appropriate interventions in the flowsheet.   Outcome: Progressing Towards Goal  Note: Pressure Injury Interventions:  Sensory Interventions: Assess changes in LOC, Assess need for specialty bed, Avoid rigorous massage over bony prominences, Minimize linen layers         Activity Interventions: Pressure redistribution bed/mattress(bed type)    Mobility Interventions: Float heels, HOB 30 degrees or less, Pressure redistribution bed/mattress (bed type)    Nutrition Interventions: Document food/fluid/supplement intake    Friction and Shear Interventions: HOB 30 degrees or less                Problem: Non-Violent Restraints  Goal: Removal from restraints as soon as assessed to be safe  Outcome: Progressing Towards Goal  Goal: No harm/injury to patient while restraints in use  Outcome: Progressing Towards Goal  Goal: Patient's dignity will be maintained  Outcome: Progressing Towards Goal  Goal: Patient Interventions  Outcome: Progressing Towards Goal

## 2021-07-30 NOTE — PROGRESS NOTES
[facility] Adult  Hospitalist Group                                                                                          Hospitalist Progress Note  Manuel Last MD  Answering service: [phone] OR [phone] from in house phone        Date of Service:  2021  NAME:  Fernando Obrien  :  3/23/1933  MRN:  [id number]      Admission Summary:   The patient is an 51-year-old male with longstanding history of dementia of Alzheimer's type. The patient is in a Memory Unit because of his agitation and psych issues, the patient was being transferred to the Psychiatry unit at [facility]. In [facility], the facility had wanted a physical and routine labs to be done at a facility before accepting the patient. The patient was evaluated in the ER. The patient upon arrival to the [facility] ER was extremely combative and agitated, received Geodon and some Ativan after which he became less combative and manageable. The patient is unable to give any history in the ER. Further questioning from the family members, it was noted that he is scheduled to undergo some TURP and has had intermittent urinary retention for which he has chronic Orta. The patient has a history of chronic urinary tract infections in the past.  The urine that was obtained was extremely dark colored for which he received a dose of vancomycin. The labs showed significantly elevated lactic acidosis of 4.5 for which IV fluids were administered. The patient's lactic acid came down to 1.5 after that. Currently, he is resting comfortably, responds to the verbal and tactile stimuli. No signs of agitation were noted.   No further history can be obtained because of the memory    Interval history / Subjective:   Patient altered and not following commands for me this am  Spoke with family regarding plan     Assessment & Plan:     1.   dementia with psychosis and agitation, - due to catheter associated UTI with hospital delerium- changed abx to meropenem, monitor w treatment  2. Lactic acidosis, resolved. 3.  Dementia of Alzheimer's type, continue with aricept and namenda- reduced doses. 4.  History of benign prostatic hypertrophy abd chronic rodriguez,   continue with the Flomax and Proscar. Urology consult- was scheduled for TURP on Aug 11th at [facility] w Dr Jase Kerr  5. Dementia with psychosis, continue with Geodon p.r.n.  - stop seroquel, psych consulted  6. Hypothyroidism, continue with Synthroid. Code status: Full Code  DVT prophylaxis: Lovenox    Care Plan discussed with: Patient/Family  Anticipated Disposition: SNF/LTC  Anticipated Discharge: 24 hours to 48 hours     Hospital Problems  Never Reviewed        Codes Class Noted POA    Lactic acidosis ICD-10-CM: E87.2  ICD-9-CM: 276.2  7/28/2021 Unknown                Review of Systems:   A comprehensive review of systems was negative except for that written in the HPI. Vital Signs:    Last 24hrs VS reviewed since prior progress note.  Most recent are:  Visit Vitals  /69 (BP 1 Location: Right upper arm, BP Patient Position: At rest)   Pulse 90   Temp 97.7 °F (36.5 °C)   Resp 18   Ht 5' 7\" (1.702 m)   Wt 55.8 kg (123 lb 0.3 oz)   SpO2 97%   BMI 19.27 kg/m²     Patient Vitals for the past 24 hrs:   Temp Pulse Resp BP SpO2   07/29/21 2301 97.7 °F (36.5 °C) 90 18 130/69 97 %   07/29/21 2011 98.4 °F (36.9 °C) 98 16 116/66 99 %   07/29/21 1610 98.6 °F (37 °C) 83 16 113/65 99 %   07/29/21 1214 97.8 °F (36.6 °C) 81 18 126/63 100 %   07/29/21 0903 97.8 °F (36.6 °C) 80 16 (!) 151/88 96 %   07/29/21 0451 98 °F (36.7 °C) 95 16 (!) 140/82 96 %   07/28/21 2348 97 °F (36.1 °C) 97 18 (!) 154/98 96 %       Intake/Output Summary (Last 24 hours) at 7/29/2021 2325  Last data filed at 7/29/2021 1724  Gross per 24 hour   Intake 1748.67 ml   Output 1200 ml   Net 548.67 ml        Physical Examination:     I had a face to face encounter with this patient and independently examined them on 7/29/2021 as outlined below:          Constitutional:  altered with confusion   ENT:  Oral mucosa dry. Resp:  CTA bilaterally. No wheezing/rhonchi/rales. No accessory muscle use   CV:  Regular rhythm, normal rate, no murmurs, gallops, rubs    GI:  Soft, non distended, non tender. Musculoskeletal:  No edema, warm, diffuse muscle wasting    Neurologic:  Moves all extremities but not following commands            Data Review:    Review and/or order of clinical lab test  Review and/or order of tests in the radiology section of CPT  Review and/or order of tests in the medicine section of CPT      Labs:     Recent Labs     07/29/21  0330 07/28/21  1459   WBC 6.4 9.8   HGB 13.9 14.2   HCT 41.4 42.7    283     Recent Labs     07/29/21  1233 07/28/21  1459    142   K 4.1 4.2   * 106   CO2 19* 23   BUN 22* 41*   CREA 0.68* 1.26   * 111*   CA 8.9 9.9     Recent Labs     07/28/21  1459   ALT 31   AP 89   TBILI 1.5*   TP 7.8   ALB 4.4   GLOB 3.4     No results for input(s): INR, PTP, APTT, INREXT in the last 72 hours. No results for input(s): FE, TIBC, PSAT, FERR in the last 72 hours. No results found for: FOL, RBCF   No results for input(s): PH, PCO2, PO2 in the last 72 hours. No results for input(s): CPK, CKNDX, TROIQ in the last 72 hours.     No lab exists for component: CPKMB  No results found for: CHOL, CHOLX, CHLST, CHOLV, HDL, HDLP, LDL, LDLC, DLDLP, TGLX, TRIGL, TRIGP, CHHD, CHHDX  No results found for: [facility]  Lab Results   Component Value Date/Time    Color STRAW 07/28/2021 08:44 PM    Appearance TURBID (A) 07/28/2021 08:44 PM    Specific gravity 1.020 07/28/2021 08:44 PM    Specific gravity 1.030 06/17/2021 11:06 PM    pH (UA) 5.0 07/28/2021 08:44 PM    Protein 100 (A) 07/28/2021 08:44 PM    Glucose Negative 07/28/2021 08:44 PM    Ketone >80 (A) 07/28/2021 08:44 PM    Bilirubin Negative 07/28/2021 08:44 PM    Urobilinogen 0.2 07/28/2021 08:44 PM    Nitrites Negative 07/28/2021 08:44 PM Leukocyte Esterase LARGE (A) 07/28/2021 08:44 PM    Epithelial cells FEW 07/28/2021 08:44 PM    Bacteria Negative 07/28/2021 08:44 PM    WBC 20-50 07/28/2021 08:44 PM    RBC 20-50 07/28/2021 08:44 PM         Medications Reviewed:     Current Facility-Administered Medications   Medication Dose Route Frequency    levothyroxine (SYNTHROID) tablet 50 mcg  50 mcg Oral ACB    [Held by provider] QUEtiapine (SEROquel) tablet 25 mg  25 mg Oral TID    vancomycin (VANCOCIN) 1,000 mg in 0.9% sodium chloride 250 mL (VIAL-MATE)  1,000 mg IntraVENous Q18H    haloperidol lactate (HALDOL) injection 2 mg  2 mg IntraMUSCular Q6H PRN    meropenem (MERREM) 500 mg in 0.9% sodium chloride (MBP/ADV) 50 mL MBP  0.5 g IntraVENous Q12H    memantine (NAMENDA) tablet 5 mg  5 mg Oral BID    donepeziL (ARICEPT) tablet 5 mg  5 mg Oral QHS    dextrose 5% infusion  75 mL/hr IntraVENous CONTINUOUS    sodium chloride (NS) flush 5-40 mL  5-40 mL IntraVENous PRN    acetaminophen (TYLENOL) tablet 650 mg  650 mg Oral Q6H PRN    Or    acetaminophen (TYLENOL) suppository 650 mg  650 mg Rectal Q6H PRN    enoxaparin (LOVENOX) injection 40 mg  40 mg SubCUTAneous DAILY    bisacodyL (DULCOLAX) suppository 10 mg  10 mg Rectal DAILY PRN    finasteride (PROSCAR) tablet 5 mg  5 mg Oral DAILY    sertraline (ZOLOFT) tablet 25 mg  25 mg Oral DAILY    tamsulosin (FLOMAX) capsule 0.4 mg  0.4 mg Oral DAILY    famotidine (PF) (PEPCID) 20 mg in 0.9% sodium chloride 10 mL injection  20 mg IntraVENous DAILY     ______________________________________________________________________  EXPECTED LENGTH OF STAY: - - -  ACTUAL LENGTH OF STAY:          1                 Ariadna Fine MD

## 2021-07-31 LAB
ANION GAP SERPL CALC-SCNC: 10 MMOL/L (ref 5–15)
BUN SERPL-MCNC: 11 MG/DL (ref 6–20)
BUN/CREAT SERPL: 18 (ref 12–20)
CALCIUM SERPL-MCNC: 8.7 MG/DL (ref 8.5–10.1)
CHLORIDE SERPL-SCNC: 108 MMOL/L (ref 97–108)
CO2 SERPL-SCNC: 21 MMOL/L (ref 21–32)
CREAT SERPL-MCNC: 0.61 MG/DL (ref 0.7–1.3)
GLUCOSE SERPL-MCNC: 151 MG/DL (ref 65–100)
POTASSIUM SERPL-SCNC: 3.4 MMOL/L (ref 3.5–5.1)
SODIUM SERPL-SCNC: 139 MMOL/L (ref 136–145)

## 2021-07-31 PROCEDURE — 74011250636 HC RX REV CODE- 250/636: Performed by: INTERNAL MEDICINE

## 2021-07-31 PROCEDURE — 74011250636 HC RX REV CODE- 250/636: Performed by: FAMILY MEDICINE

## 2021-07-31 PROCEDURE — 65270000029 HC RM PRIVATE

## 2021-07-31 PROCEDURE — 74011000258 HC RX REV CODE- 258: Performed by: FAMILY MEDICINE

## 2021-07-31 PROCEDURE — 51798 US URINE CAPACITY MEASURE: CPT

## 2021-07-31 PROCEDURE — 74011250637 HC RX REV CODE- 250/637: Performed by: FAMILY MEDICINE

## 2021-07-31 PROCEDURE — 80048 BASIC METABOLIC PNL TOTAL CA: CPT

## 2021-07-31 PROCEDURE — 74011250636 HC RX REV CODE- 250/636: Performed by: NURSE PRACTITIONER

## 2021-07-31 PROCEDURE — 36415 COLL VENOUS BLD VENIPUNCTURE: CPT

## 2021-07-31 RX ADMIN — MEROPENEM 500 MG: 500 INJECTION, POWDER, FOR SOLUTION INTRAVENOUS at 22:48

## 2021-07-31 RX ADMIN — FAMOTIDINE 20 MG: 10 INJECTION, SOLUTION INTRAVENOUS at 08:40

## 2021-07-31 RX ADMIN — ENOXAPARIN SODIUM 40 MG: 40 INJECTION SUBCUTANEOUS at 08:40

## 2021-07-31 RX ADMIN — MEROPENEM 500 MG: 500 INJECTION, POWDER, FOR SOLUTION INTRAVENOUS at 08:40

## 2021-07-31 RX ADMIN — MEROPENEM 500 MG: 500 INJECTION, POWDER, FOR SOLUTION INTRAVENOUS at 14:48

## 2021-07-31 RX ADMIN — DEXTROSE MONOHYDRATE 75 ML/HR: 50 INJECTION, SOLUTION INTRAVENOUS at 22:48

## 2021-07-31 RX ADMIN — MEROPENEM 500 MG: 500 INJECTION, POWDER, FOR SOLUTION INTRAVENOUS at 02:29

## 2021-07-31 RX ADMIN — DEXTROSE MONOHYDRATE 75 ML/HR: 50 INJECTION, SOLUTION INTRAVENOUS at 05:18

## 2021-07-31 RX ADMIN — HALOPERIDOL LACTATE 2 MG: 5 INJECTION, SOLUTION INTRAMUSCULAR at 02:26

## 2021-07-31 NOTE — PROGRESS NOTES
Bedside and Verbal shift change report given to [address] [place] [address] (oncoming nurse) by Lottie Hancock (offgoing nurse). Report included the following information SBAR, Kardex, MAR and Accordion.

## 2021-07-31 NOTE — PROGRESS NOTES
TRANSFER - OUT REPORT:    Verbal report given to paulo(name) on Jen Wetzel  being transferred to (unit) for routine progression of care       Report consisted of patients Situation, Background, Assessment and   Recommendations(SBAR). Information from the following report(s) SBAR and Cardiac Rhythm sr-st was reviewed with the receiving nurse. Lines:   Peripheral IV 07/28/21 Left Arm (Active)   Site Assessment Clean, dry, & intact 07/31/21 1600   Phlebitis Assessment 0 07/31/21 1600   Infiltration Assessment 0 07/31/21 1600   Dressing Status Clean, dry, & intact 07/31/21 1600   Dressing Type Tape;Transparent 07/31/21 1600   Hub Color/Line Status Pink; Infusing 07/31/21 1600   Action Taken Open ports on tubing capped 07/31/21 1600   Alcohol Cap Used Yes 07/31/21 1600        Opportunity for questions and clarification was provided.       Patient transported with:   Edgardo Harvey to reach pt spouse or son regarding pt's transfer

## 2021-07-31 NOTE — PROGRESS NOTES
[address] [facility] Adult  Hospitalist Group                                                                                          Hospitalist Progress Note  Adilene Rogers MD  Answering service: [phone] OR [phone] from in house phone        Date of Service:  2021  NAME:  Vandana Wiseman  :  3/23/1933  MRN:  [id number]      Admission Summary:   The patient is an 79-year-old male with longstanding history of dementia of Alzheimer's type. The patient is in a Memory Unit because of his agitation and psych issues, the patient was being transferred to the Psychiatry unit at [facility]. In [facility], the facility had wanted a physical and routine labs to be done at a facility before accepting the patient. The patient was evaluated in the ER. The patient upon arrival to the [facility] ER was extremely combative and agitated, received Geodon and some Ativan after which he became less combative and manageable. The patient is unable to give any history in the ER. Further questioning from the family members, it was noted that he is scheduled to undergo some TURP and has had intermittent urinary retention for which he has chronic Orta. The patient has a history of chronic urinary tract infections in the past.  The urine that was obtained was extremely dark colored for which he received a dose of vancomycin. The labs showed significantly elevated lactic acidosis of 4.5 for which IV fluids were administered. The patient's lactic acid came down to 1.5 after that. Currently, he is resting comfortably, responds to the verbal and tactile stimuli. No signs of agitation were noted.   No further history can be obtained because of the memory    Interval history / Subjective:   Patient altered and not following commands for me this am  In restraints and received IV haldol overnight     Assessment & Plan:     1.   dementia with psychosis and agitation, - due to catheter associated UTI with hospital delerium- changed abx to meropenem, monitor w treatment  2. Lactic acidosis, resolved. 3.  Dementia of Alzheimer's type, continue with aricept and namenda- reduced doses. 4.  History of benign prostatic hypertrophy abd chronic rodriguez,   continue with the Flomax and Proscar. Urology consult- was scheduled for TURP on Aug 11th at [facility] w Dr Margarita Dorado  5. Dementia with psychosis, continue with Haldol and Geodon p.r.n.  - stop seroquel, psych consulted= recs noted  6. Hypothyroidism, continue with Synthroid. Code status: Full Code  DVT prophylaxis: Lovenox    Care Plan discussed with: Patient/Family  Anticipated Disposition: SNF/LTC  Anticipated Discharge: 24 hours to 48 hours     Hospital Problems  Never Reviewed        Codes Class Noted POA    Lactic acidosis ICD-10-CM: E87.2  ICD-9-CM: 276.2  7/28/2021 Unknown                Review of Systems:   A comprehensive review of systems was negative except for that written in the HPI. Vital Signs:    Last 24hrs VS reviewed since prior progress note.  Most recent are:  Visit Vitals  BP (!) 143/91 (BP 1 Location: Right upper arm, BP Patient Position: At rest)   Pulse (!) 117   Temp 98 °F (36.7 °C)   Resp 18   Ht 5' 7\" (1.702 m)   Wt 52.1 kg (114 lb 13.8 oz)   SpO2 97%   BMI 17.99 kg/m²     Patient Vitals for the past 24 hrs:   Temp Pulse Resp BP SpO2   07/30/21 2039 98 °F (36.7 °C) (!) 117 18 (!) 143/91 97 %   07/30/21 1155 97.8 °F (36.6 °C) 97 16 (!) 146/93 98 %   07/30/21 0904 98.5 °F (36.9 °C) 81 16 127/71 98 %   07/30/21 0457 97.1 °F (36.2 °C) 84 16 133/64 96 %   07/29/21 2301 97.7 °F (36.5 °C) 90 18 130/69 97 %       Intake/Output Summary (Last 24 hours) at 7/30/2021 2250  Last data filed at 7/30/2021 1946  Gross per 24 hour   Intake 590 ml   Output 750 ml   Net -160 ml        Physical Examination:     I had a face to face encounter with this patient and independently examined them on 7/30/2021 as outlined below:          Constitutional:  altered with confusion   ENT:  Oral mucosa dry. Resp:  CTA bilaterally. No wheezing/rhonchi/rales. No accessory muscle use   CV:  Regular rhythm, normal rate, no murmurs, gallops, rubs    GI:  Soft, non distended, non tender. Musculoskeletal:  No edema, warm, diffuse muscle wasting    Neurologic:  Moves all extremities but not following commands            Data Review:    Review and/or order of clinical lab test  Review and/or order of tests in the radiology section of CPT  Review and/or order of tests in the medicine section of [facility]      Labs:     Recent Labs     07/29/21  0330 07/28/21  1459   WBC 6.4 9.8   HGB 13.9 14.2   HCT 41.4 42.7    283     Recent Labs     07/30/21  0502 07/29/21  1233 07/28/21  1459    145 142   K 3.7 4.1 4.2   * 114* 106   CO2 18* 19* 23   BUN 15 22* 41*   CREA 0.64* 0.68* 1.26   * 106* 111*   CA 8.9 8.9 9.9     Recent Labs     07/28/21  1459   ALT 31   AP 89   TBILI 1.5*   TP 7.8   ALB 4.4   GLOB 3.4     No results for input(s): INR, PTP, APTT, INREXT, INREXT in the last 72 hours. No results for input(s): FE, TIBC, PSAT, FERR in the last 72 hours. No results found for: FOL, RBCF   No results for input(s): PH, PCO2, PO2 in the last 72 hours. No results for input(s): CPK, CKNDX, TROIQ in the last 72 hours.     No lab exists for component: CPKMB  No results found for: CHOL, CHOLX, CHLST, CHOLV, HDL, HDLP, LDL, LDLC, DLDLP, TGLX, TRIGL, TRIGP, CHHD, CHHDX  No results found for: [facility]  Lab Results   Component Value Date/Time    Color STRAW 07/28/2021 08:44 PM    Appearance TURBID (A) 07/28/2021 08:44 PM    Specific gravity 1.020 07/28/2021 08:44 PM    Specific gravity 1.030 06/17/2021 11:06 PM    pH (UA) 5.0 07/28/2021 08:44 PM    Protein 100 (A) 07/28/2021 08:44 PM    Glucose Negative 07/28/2021 08:44 PM    Ketone >80 (A) 07/28/2021 08:44 PM    Bilirubin Negative 07/28/2021 08:44 PM    Urobilinogen 0.2 07/28/2021 08:44 PM    Nitrites Negative 07/28/2021 08:44 PM Leukocyte Esterase LARGE (A) 07/28/2021 08:44 PM    Epithelial cells FEW 07/28/2021 08:44 PM    Bacteria Negative 07/28/2021 08:44 PM    WBC 20-50 07/28/2021 08:44 PM    RBC 20-50 07/28/2021 08:44 PM         Medications Reviewed:     Current Facility-Administered Medications   Medication Dose Route Frequency    meropenem (MERREM) 500 mg in 0.9% sodium chloride (MBP/ADV) 50 mL MBP  0.5 g IntraVENous Q6H    ziprasidone (GEODON) 10 mg in sterile water (preservative free) 0.5 mL injection  10 mg IntraMUSCular Q6H PRN    [START ON 7/31/2021] rivastigmine (EXELON) 9.5 mg/24 hour patch 1 Patch  1 Patch TransDERmal DAILY    levothyroxine (SYNTHROID) tablet 50 mcg  50 mcg Oral ACB    [Held by provider] QUEtiapine (SEROquel) tablet 25 mg  25 mg Oral TID    haloperidol lactate (HALDOL) injection 2 mg  2 mg IntraMUSCular Q6H PRN    [Held by provider] memantine (NAMENDA) tablet 5 mg  5 mg Oral BID    [Held by provider] donepeziL (ARICEPT) tablet 5 mg  5 mg Oral QHS    dextrose 5% infusion  75 mL/hr IntraVENous CONTINUOUS    sodium chloride (NS) flush 5-40 mL  5-40 mL IntraVENous PRN    acetaminophen (TYLENOL) tablet 650 mg  650 mg Oral Q6H PRN    Or    acetaminophen (TYLENOL) suppository 650 mg  650 mg Rectal Q6H PRN    enoxaparin (LOVENOX) injection 40 mg  40 mg SubCUTAneous DAILY    bisacodyL (DULCOLAX) suppository 10 mg  10 mg Rectal DAILY PRN    finasteride (PROSCAR) tablet 5 mg  5 mg Oral DAILY    sertraline (ZOLOFT) tablet 25 mg  25 mg Oral DAILY    tamsulosin (FLOMAX) capsule 0.4 mg  0.4 mg Oral DAILY    famotidine (PF) (PEPCID) 20 mg in 0.9% sodium chloride 10 mL injection  20 mg IntraVENous DAILY     ______________________________________________________________________  EXPECTED LENGTH OF STAY: - - -  ACTUAL LENGTH OF STAY:          2                 Rufina Andrews MD

## 2021-07-31 NOTE — PROGRESS NOTES
TRANSFER - IN REPORT:    Verbal report received from Trudy(name) on [employer]  being received from [facility](unit) for routine progression of care      Report consisted of patients Situation, Background, Assessment and   Recommendations(SBAR). Information from the following report(s) SBAR, Kardex, Intake/Output, MAR and Recent Results was reviewed with the receiving nurse. Opportunity for questions and clarification was provided. Assessment completed upon patients arrival to unit and care assumed.

## 2021-07-31 NOTE — PROGRESS NOTES
Problem: Falls - Risk of  Goal: *Absence of Falls  Description: Document Robbin Altamiranorogerharriet Fall Risk and appropriate interventions in the flowsheet. Outcome: Progressing Towards Goal  Note: Fall Risk Interventions:       Mentation Interventions: Bed/chair exit alarm, Door open when patient unattended, Reorient patient    Medication Interventions: Evaluate medications/consider consulting pharmacy    Elimination Interventions: Toileting schedule/hourly rounds              Problem: Pressure Injury - Risk of  Goal: *Prevention of pressure injury  Description: Document Delfino Scale and appropriate interventions in the flowsheet.   Outcome: Progressing Towards Goal  Note: Pressure Injury Interventions:  Sensory Interventions: Assess changes in LOC, Check visual cues for pain, Keep linens dry and wrinkle-free, Maintain/enhance activity level, Minimize linen layers, Monitor skin under medical devices, Pad between skin to skin, Pressure redistribution bed/mattress (bed type)    Moisture Interventions: Absorbent underpads    Activity Interventions: Pressure redistribution bed/mattress(bed type)    Mobility Interventions: Float heels, HOB 30 degrees or less, Pressure redistribution bed/mattress (bed type), PT/OT evaluation    Nutrition Interventions: Document food/fluid/supplement intake    Friction and Shear Interventions: Apply protective barrier, creams and emollients, Lift sheet, HOB 30 degrees or less, Minimize layers                Problem: Non-Violent Restraints  Goal: Removal from restraints as soon as assessed to be safe  Outcome: Progressing Towards Goal  Goal: No harm/injury to patient while restraints in use  Outcome: Progressing Towards Goal  Goal: Patient's dignity will be maintained  Outcome: Progressing Towards Goal  Goal: Patient Interventions  Outcome: Progressing Towards Goal

## 2021-08-01 LAB
ANION GAP SERPL CALC-SCNC: 8 MMOL/L (ref 5–15)
BUN SERPL-MCNC: 10 MG/DL (ref 6–20)
BUN/CREAT SERPL: 13 (ref 12–20)
CALCIUM SERPL-MCNC: 8.5 MG/DL (ref 8.5–10.1)
CHLORIDE SERPL-SCNC: 104 MMOL/L (ref 97–108)
CO2 SERPL-SCNC: 26 MMOL/L (ref 21–32)
CREAT SERPL-MCNC: 0.76 MG/DL (ref 0.7–1.3)
GLUCOSE SERPL-MCNC: 135 MG/DL (ref 65–100)
POTASSIUM SERPL-SCNC: 2.6 MMOL/L (ref 3.5–5.1)
SODIUM SERPL-SCNC: 138 MMOL/L (ref 136–145)

## 2021-08-01 PROCEDURE — 36415 COLL VENOUS BLD VENIPUNCTURE: CPT

## 2021-08-01 PROCEDURE — 74011250636 HC RX REV CODE- 250/636: Performed by: INTERNAL MEDICINE

## 2021-08-01 PROCEDURE — 74011250636 HC RX REV CODE- 250/636: Performed by: NURSE PRACTITIONER

## 2021-08-01 PROCEDURE — 74011000258 HC RX REV CODE- 258: Performed by: FAMILY MEDICINE

## 2021-08-01 PROCEDURE — 74011250637 HC RX REV CODE- 250/637: Performed by: INTERNAL MEDICINE

## 2021-08-01 PROCEDURE — 74011250637 HC RX REV CODE- 250/637: Performed by: FAMILY MEDICINE

## 2021-08-01 PROCEDURE — 65270000029 HC RM PRIVATE

## 2021-08-01 PROCEDURE — 74011250636 HC RX REV CODE- 250/636: Performed by: FAMILY MEDICINE

## 2021-08-01 PROCEDURE — 74011000250 HC RX REV CODE- 250: Performed by: INTERNAL MEDICINE

## 2021-08-01 PROCEDURE — 80048 BASIC METABOLIC PNL TOTAL CA: CPT

## 2021-08-01 RX ORDER — POTASSIUM CHLORIDE 7.45 MG/ML
10 INJECTION INTRAVENOUS
Status: COMPLETED | OUTPATIENT
Start: 2021-08-01 | End: 2021-08-01

## 2021-08-01 RX ADMIN — HALOPERIDOL LACTATE 2 MG: 5 INJECTION, SOLUTION INTRAMUSCULAR at 16:24

## 2021-08-01 RX ADMIN — FAMOTIDINE 20 MG: 10 INJECTION, SOLUTION INTRAVENOUS at 09:31

## 2021-08-01 RX ADMIN — MEROPENEM 500 MG: 500 INJECTION, POWDER, FOR SOLUTION INTRAVENOUS at 09:18

## 2021-08-01 RX ADMIN — POTASSIUM CHLORIDE 10 MEQ: 7.46 INJECTION, SOLUTION INTRAVENOUS at 06:14

## 2021-08-01 RX ADMIN — MEROPENEM 500 MG: 500 INJECTION, POWDER, FOR SOLUTION INTRAVENOUS at 03:10

## 2021-08-01 RX ADMIN — POTASSIUM CHLORIDE 10 MEQ: 7.46 INJECTION, SOLUTION INTRAVENOUS at 05:11

## 2021-08-01 RX ADMIN — ENOXAPARIN SODIUM 40 MG: 40 INJECTION SUBCUTANEOUS at 09:30

## 2021-08-01 RX ADMIN — POTASSIUM CHLORIDE 10 MEQ: 7.46 INJECTION, SOLUTION INTRAVENOUS at 07:28

## 2021-08-01 NOTE — PROGRESS NOTES
Bedside and Verbal shift change report given to Ivory Hannon (oncoming nurse) by Lashonda Toro (offgoing nurse). Report included the following information SBAR, Kardex, Intake/Output, MAR and Recent Results.

## 2021-08-01 NOTE — PROGRESS NOTES
[facility] [facility] Adult  Hospitalist Group                                                                                          Hospitalist Progress Note  Yovany Duron MD  Answering service: [phone] OR [phone] from in house phone      NAME:  Maddie Almaazn  :  3/23/1933  MRN:  [id number]      Admission Summary:   The patient is an 51-year-old male with longstanding history of dementia of Alzheimer's type. The patient is in a Memory Unit because of his agitation and psych issues, the patient was being transferred to the Psychiatry unit at [facility]. In [facility], the facility had wanted a physical and routine labs to be done at a facility before accepting the patient. The patient was evaluated in the ER. The patient upon arrival to the [address] [place] [address] ER was extremely combative and agitated, received Geodon and some Ativan after which he became less combative and manageable. The patient is unable to give any history in the ER. Further questioning from the family members, it was noted that he is scheduled to undergo some TURP and has had intermittent urinary retention for which he has chronic Orta. The patient has a history of chronic urinary tract infections in the past.  The urine that was obtained was extremely dark colored for which he received a dose of vancomycin. The labs showed significantly elevated lactic acidosis of 4.5 for which IV fluids were administered. The patient's lactic acid came down to 1.5 after that. Currently, he is resting comfortably, responds to the verbal and tactile stimuli. No signs of agitation were noted.   No further history can be obtained because of the memory    Interval history / Subjective:   Patient altered  Following some commands for me this am  In restraints and received IV haldol overnight     Assessment & Plan:     1.   dementia with psychosis and agitation, - due to catheter associated UTI with hospital delerium- changed abx to meropenem, monitor w treatment  2. Lactic acidosis, resolved. 3.  Dementia of Alzheimer's type,   Unable to take PO meds- added exelon patch-   4. History of benign prostatic hypertrophy abd chronic rodriguez,   continue with the Flomax and Proscar. Urology consult- was scheduled for TURP on Aug 11th at [facility] w Dr Terrance Barrera  5. Dementia with psychosis, continue with Haldol and Geodon p.r.n.  - stop seroquel, psych consulted= recs noted  6. Hypothyroidism, continue with Synthroid. Code status: Full Code  DVT prophylaxis: Lovenox    Care Plan discussed with: Patient/Family  Anticipated Disposition: SNF/LTC  Anticipated Discharge: 24 hours to 48 hours     Hospital Problems  Never Reviewed        Codes Class Noted POA    Lactic acidosis ICD-10-CM: E87.2  ICD-9-CM: 276.2  7/28/2021 Unknown                Review of Systems:   A comprehensive review of systems was negative except for that written in the HPI. Vital Signs:    Last 24hrs VS reviewed since prior progress note.  Most recent are:  Visit Vitals  BP (!) 96/58 (BP 1 Location: Right upper arm, BP Patient Position: At rest)   Pulse 79   Temp 98.1 °F (36.7 °C)   Resp 16   Ht 5' 7\" (1.702 m)   Wt 55.9 kg (123 lb 3.8 oz)   SpO2 93%   BMI 19.30 kg/m²     Patient Vitals for the past 24 hrs:   Temp Pulse Resp BP SpO2   07/31/21 2053 98.1 °F (36.7 °C) 79 16 (!) 96/58 93 %   07/31/21 1944 97.7 °F (36.5 °C) (!) 110 16 (!) 156/99 96 %   07/31/21 1551 97.9 °F (36.6 °C) 96 19 127/77    07/31/21 1221 97.9 °F (36.6 °C) 89 16 135/83 96 %   07/31/21 0908 97.8 °F (36.6 °C) (!) 59 16 (!) 95/59 99 %   07/31/21 0857 97.8 °F (36.6 °C) 92 16 (!) 176/106 99 %   07/31/21 0705 97.5 °F (36.4 °C) 85 16 138/83 99 %       Intake/Output Summary (Last 24 hours) at 8/1/2021 3222  Last data filed at 7/31/2021 1837  Gross per 24 hour   Intake    Output 900 ml   Net -900 ml        Physical Examination:     I had a face to face encounter with this patient and independently examined them on 8/1/2021 as outlined below:          Constitutional:  awake and alert    ENT:  Oral mucosa dry. Resp:  CTA bilaterally. No wheezing/rhonchi/rales. No accessory muscle use   CV:  Regular rhythm, normal rate, no murmurs, gallops, rubs    GI:  Soft, non distended, non tender. Musculoskeletal:  No edema, warm, diffuse muscle wasting    Neurologic:  Moves all extremities - no focal neuro deficits            Data Review:    Review and/or order of clinical lab test  Review and/or order of tests in the radiology section of CPT  Review and/or order of tests in the medicine section of CPT      Labs:     No results for input(s): WBC, HGB, HCT, PLT, HGBEXT, HCTEXT, PLTEXT, HGBEXT, HCTEXT, PLTEXT in the last 72 hours. Recent Labs     08/01/21  0336 07/31/21  0232 07/30/21  0502    139 142   K 2.6* 3.4* 3.7    108 114*   CO2 26 21 18*   BUN 10 11 15   CREA 0.76 0.61* 0.64*   * 151* 118*   CA 8.5 8.7 8.9     No results for input(s): ALT, AP, TBIL, TBILI, TP, ALB, GLOB, GGT, AML, LPSE in the last 72 hours. No lab exists for component: SGOT, GPT, AMYP, HLPSE  No results for input(s): INR, PTP, APTT, INREXT, INREXT in the last 72 hours. No results for input(s): FE, TIBC, PSAT, FERR in the last 72 hours. No results found for: FOL, RBCF   No results for input(s): PH, PCO2, PO2 in the last 72 hours. No results for input(s): CPK, CKNDX, TROIQ in the last 72 hours.     No lab exists for component: CPKMB  No results found for: CHOL, CHOLX, CHLST, CHOLV, HDL, HDLP, LDL, LDLC, DLDLP, TGLX, TRIGL, TRIGP, CHHD, CHHDX  No results found for: [facility]  Lab Results   Component Value Date/Time    Color STRAW 07/28/2021 08:44 PM    Appearance TURBID (A) 07/28/2021 08:44 PM    Specific gravity 1.020 07/28/2021 08:44 PM    Specific gravity 1.030 06/17/2021 11:06 PM    pH (UA) 5.0 07/28/2021 08:44 PM    Protein 100 (A) 07/28/2021 08:44 PM    Glucose Negative 07/28/2021 08:44 PM    Ketone >80 (A) 07/28/2021 08:44 PM Bilirubin Negative 07/28/2021 08:44 PM    Urobilinogen 0.2 07/28/2021 08:44 PM    Nitrites Negative 07/28/2021 08:44 PM    Leukocyte Esterase LARGE (A) 07/28/2021 08:44 PM    Epithelial cells FEW 07/28/2021 08:44 PM    Bacteria Negative 07/28/2021 08:44 PM    WBC 20-50 07/28/2021 08:44 PM    RBC 20-50 07/28/2021 08:44 PM         Medications Reviewed:     Current Facility-Administered Medications   Medication Dose Route Frequency    potassium chloride 10 mEq in 100 ml IVPB  10 mEq IntraVENous Q1H    meropenem (MERREM) 500 mg in 0.9% sodium chloride (MBP/ADV) 50 mL MBP  0.5 g IntraVENous Q6H    ziprasidone (GEODON) 10 mg in sterile water (preservative free) 0.5 mL injection  10 mg IntraMUSCular Q6H PRN    rivastigmine (EXELON) 9.5 mg/24 hour patch 1 Patch  1 Patch TransDERmal DAILY    levothyroxine (SYNTHROID) tablet 50 mcg  50 mcg Oral ACB    [Held by provider] QUEtiapine (SEROquel) tablet 25 mg  25 mg Oral TID    haloperidol lactate (HALDOL) injection 2 mg  2 mg IntraMUSCular Q6H PRN    [Held by provider] memantine (NAMENDA) tablet 5 mg  5 mg Oral BID    [Held by provider] donepeziL (ARICEPT) tablet 5 mg  5 mg Oral QHS    dextrose 5% infusion  75 mL/hr IntraVENous CONTINUOUS    sodium chloride (NS) flush 5-40 mL  5-40 mL IntraVENous PRN    acetaminophen (TYLENOL) tablet 650 mg  650 mg Oral Q6H PRN    Or    acetaminophen (TYLENOL) suppository 650 mg  650 mg Rectal Q6H PRN    enoxaparin (LOVENOX) injection 40 mg  40 mg SubCUTAneous DAILY    bisacodyL (DULCOLAX) suppository 10 mg  10 mg Rectal DAILY PRN    finasteride (PROSCAR) tablet 5 mg  5 mg Oral DAILY    sertraline (ZOLOFT) tablet 25 mg  25 mg Oral DAILY    tamsulosin (FLOMAX) capsule 0.4 mg  0.4 mg Oral DAILY    famotidine (PF) (PEPCID) 20 mg in 0.9% sodium chloride 10 mL injection  20 mg IntraVENous DAILY     ______________________________________________________________________  EXPECTED LENGTH OF STAY: - - -  ACTUAL LENGTH OF STAY: Errol Lopez MD

## 2021-08-02 LAB
BACTERIA SPEC CULT: NORMAL
SERVICE CMNT-IMP: NORMAL

## 2021-08-02 PROCEDURE — 74011250637 HC RX REV CODE- 250/637: Performed by: FAMILY MEDICINE

## 2021-08-02 PROCEDURE — 74011250636 HC RX REV CODE- 250/636: Performed by: FAMILY MEDICINE

## 2021-08-02 PROCEDURE — 65270000029 HC RM PRIVATE

## 2021-08-02 PROCEDURE — 74011250636 HC RX REV CODE- 250/636: Performed by: INTERNAL MEDICINE

## 2021-08-02 PROCEDURE — 74011000250 HC RX REV CODE- 250: Performed by: INTERNAL MEDICINE

## 2021-08-02 PROCEDURE — 74011250637 HC RX REV CODE- 250/637: Performed by: INTERNAL MEDICINE

## 2021-08-02 RX ADMIN — FAMOTIDINE 20 MG: 10 INJECTION, SOLUTION INTRAVENOUS at 12:14

## 2021-08-02 RX ADMIN — ENOXAPARIN SODIUM 40 MG: 40 INJECTION SUBCUTANEOUS at 12:14

## 2021-08-02 RX ADMIN — DEXTROSE MONOHYDRATE 50 ML/HR: 50 INJECTION, SOLUTION INTRAVENOUS at 03:07

## 2021-08-02 NOTE — PROGRESS NOTES
Famotidine - Renal dosing by Pharmacy  Current regimen:  20 mg IV Q 24 hr  Recent Labs     08/01/21  0336 07/31/21  0232   CREA 0.76 0.61*   BUN 10 11     Estimated CrCl:  53.1 ml/min    Plan:   Change to 20 mg IV Q 12 hr with respect to estimated creatinine clearance (CrCl >50 mL/min) per [facility]/[employer]-approved Renal Dosing Adjustment protocol. Pharmacy will continue to monitor this patient daily for changes in clinical status and renal function. Please contact pharmacy with any questions/clarifications at .      America Yan, PharmD  Clinical Pharmacist, Orthopedics and Med/Surg  [id number] [facility] ()

## 2021-08-02 NOTE — PROGRESS NOTES
[address] [facility] Adult  Hospitalist Group                                                                                          Hospitalist Progress Note  Iveth Hemphill MD  Answering service: [phone] OR [phone] from in house phone      NAME:  Linda Arellano  :  3/23/1933  MRN:  [id number]      Admission Summary:   The patient is an 43-year-old male with longstanding history of dementia of Alzheimer's type. The patient is in a Memory Unit because of his agitation and psych issues, the patient was being transferred to the Psychiatry unit at [facility]. In [facility], the facility had wanted a physical and routine labs to be done at a facility before accepting the patient. The patient was evaluated in the ER. The patient upon arrival to the [facility] ER was extremely combative and agitated, received Geodon and some Ativan after which he became less combative and manageable. The patient is unable to give any history in the ER. Further questioning from the family members, it was noted that he is scheduled to undergo some TURP and has had intermittent urinary retention for which he has chronic Orta. The patient has a history of chronic urinary tract infections in the past.  The urine that was obtained was extremely dark colored for which he received a dose of vancomycin. The labs showed significantly elevated lactic acidosis of 4.5 for which IV fluids were administered. The patient's lactic acid came down to 1.5 after that. Currently, he is resting comfortably, responds to the verbal and tactile stimuli. No signs of agitation were noted.   No further history can be obtained because of the memory    Interval history / Subjective:   Patient altered  Following some commands for me this am  In restraints and received IV haldol overnight     Assessment & Plan:     1.   dementia with psychosis and agitation, - due to catheter associated UTI with hospital delerium- changed abx to meropenem, monitor w treatment  2. Lactic acidosis, resolved. 3.  Dementia of Alzheimer's type,   Unable to take PO meds- added exelon patch-   4. History of benign prostatic hypertrophy abd chronic rodriguez,   continue with the Flomax and Proscar. Urology consult- was scheduled for TURP on Aug 11th at [facility] w Dr Nicole Boyle  5. Dementia with psychosis, continue with Haldol and Geodon p.r.n.  - stop seroquel, psych consulted= recs noted  6. Hypothyroidism, continue with Synthroid. Code status: Full Code  DVT prophylaxis: Lovenox    Care Plan discussed with: Patient/Family  Anticipated Disposition: SNF/LTC  Anticipated Discharge: 24 hours to 48 hours     Hospital Problems  Never Reviewed        Codes Class Noted POA    Lactic acidosis ICD-10-CM: E87.2  ICD-9-CM: 276.2  7/28/2021 Unknown                Review of Systems:   A comprehensive review of systems was negative except for that written in the HPI. Vital Signs:    Last 24hrs VS reviewed since prior progress note. Most recent are:  Visit Vitals  /81 (BP 1 Location: Right upper arm, BP Patient Position: Lying)   Pulse (!) 108   Temp 97.1 °F (36.2 °C)   Resp 18   Ht 5' 7\" (1.702 m)   Wt 55.9 kg (123 lb 3.8 oz)   SpO2 95%   BMI 19.30 kg/m²     Patient Vitals for the past 24 hrs:   Temp Pulse Resp BP SpO2   08/01/21 1518 97.1 °F (36.2 °C) (!) 108 18 134/81 95 %   08/01/21 1350 98.1 °F (36.7 °C) 77 16 112/68 93 %   08/01/21 0922 97.7 °F (36.5 °C) 90 16 (!) 154/80 94 %       Intake/Output Summary (Last 24 hours) at 8/1/2021 2206  Last data filed at 8/1/2021 1859  Gross per 24 hour   Intake    Output 450 ml   Net -450 ml        Physical Examination:     I had a face to face encounter with this patient and independently examined them on 8/1/2021 as outlined below:          Constitutional:  awake and alert    ENT:  Oral mucosa dry. Resp:  CTA bilaterally. No wheezing/rhonchi/rales.  No accessory muscle use   CV:  Regular rhythm, normal rate, no murmurs, gallops, rubs    GI:  Soft, non distended, non tender. Musculoskeletal:  No edema, warm, diffuse muscle wasting    Neurologic:  Moves all extremities - no focal neuro deficits            Data Review:    Review and/or order of clinical lab test  Review and/or order of tests in the radiology section of CPT  Review and/or order of tests in the medicine section of CPT      Labs:     No results for input(s): WBC, HGB, HCT, PLT, HGBEXT, HCTEXT, PLTEXT, HGBEXT, HCTEXT, PLTEXT in the last 72 hours. Recent Labs     08/01/21  0336 07/31/21  0232 07/30/21  0502    139 142   K 2.6* 3.4* 3.7    108 114*   CO2 26 21 18*   BUN 10 11 15   CREA 0.76 0.61* 0.64*   * 151* 118*   CA 8.5 8.7 8.9     No results for input(s): ALT, AP, TBIL, TBILI, TP, ALB, GLOB, GGT, AML, LPSE in the last 72 hours. No lab exists for component: SGOT, GPT, AMYP, HLPSE  No results for input(s): INR, PTP, APTT, INREXT, INREXT in the last 72 hours. No results for input(s): FE, TIBC, PSAT, FERR in the last 72 hours. No results found for: FOL, RBCF   No results for input(s): PH, PCO2, PO2 in the last 72 hours. No results for input(s): CPK, CKNDX, TROIQ in the last 72 hours.     No lab exists for component: CPKMB  No results found for: CHOL, CHOLX, CHLST, CHOLV, HDL, HDLP, LDL, LDLC, DLDLP, TGLX, TRIGL, TRIGP, CHHD, CHHDX  No results found for: [facility]  Lab Results   Component Value Date/Time    Color STRAW 07/28/2021 08:44 PM    Appearance TURBID (A) 07/28/2021 08:44 PM    Specific gravity 1.020 07/28/2021 08:44 PM    Specific gravity 1.030 06/17/2021 11:06 PM    pH (UA) 5.0 07/28/2021 08:44 PM    Protein 100 (A) 07/28/2021 08:44 PM    Glucose Negative 07/28/2021 08:44 PM    Ketone >80 (A) 07/28/2021 08:44 PM    Bilirubin Negative 07/28/2021 08:44 PM    Urobilinogen 0.2 07/28/2021 08:44 PM    Nitrites Negative 07/28/2021 08:44 PM    Leukocyte Esterase LARGE (A) 07/28/2021 08:44 PM    Epithelial cells FEW 07/28/2021 08:44 PM Bacteria Negative 07/28/2021 08:44 PM    WBC 20-50 07/28/2021 08:44 PM    RBC 20-50 07/28/2021 08:44 PM         Medications Reviewed:     Current Facility-Administered Medications   Medication Dose Route Frequency    ziprasidone (GEODON) 10 mg in sterile water (preservative free) 0.5 mL injection  10 mg IntraMUSCular Q6H PRN    rivastigmine (EXELON) 9.5 mg/24 hour patch 1 Patch  1 Patch TransDERmal DAILY    levothyroxine (SYNTHROID) tablet 50 mcg  50 mcg Oral ACB    [Held by provider] QUEtiapine (SEROquel) tablet 25 mg  25 mg Oral TID    haloperidol lactate (HALDOL) injection 2 mg  2 mg IntraMUSCular Q6H PRN    [Held by provider] memantine (NAMENDA) tablet 5 mg  5 mg Oral BID    [Held by provider] donepeziL (ARICEPT) tablet 5 mg  5 mg Oral QHS    dextrose 5% infusion  50 mL/hr IntraVENous CONTINUOUS    sodium chloride (NS) flush 5-40 mL  5-40 mL IntraVENous PRN    acetaminophen (TYLENOL) tablet 650 mg  650 mg Oral Q6H PRN    Or    acetaminophen (TYLENOL) suppository 650 mg  650 mg Rectal Q6H PRN    enoxaparin (LOVENOX) injection 40 mg  40 mg SubCUTAneous DAILY    bisacodyL (DULCOLAX) suppository 10 mg  10 mg Rectal DAILY PRN    finasteride (PROSCAR) tablet 5 mg  5 mg Oral DAILY    sertraline (ZOLOFT) tablet 25 mg  25 mg Oral DAILY    tamsulosin (FLOMAX) capsule 0.4 mg  0.4 mg Oral DAILY    famotidine (PF) (PEPCID) 20 mg in 0.9% sodium chloride 10 mL injection  20 mg IntraVENous DAILY     ______________________________________________________________________  EXPECTED LENGTH OF STAY: - - -  ACTUAL LENGTH OF STAY:          4                 Sampson Mccrary MD

## 2021-08-02 NOTE — PROGRESS NOTES
Bedside shift change report given to Vahid Soto RN (oncoming nurse) by Mercedez Fraser (offgoing nurse). Report included the following information SBAR, Kardex, Intake/Output, MAR and Recent Results.

## 2021-08-03 LAB
ANION GAP SERPL CALC-SCNC: 9 MMOL/L (ref 5–15)
BUN SERPL-MCNC: 12 MG/DL (ref 6–20)
BUN/CREAT SERPL: 24 (ref 12–20)
CALCIUM SERPL-MCNC: 9.2 MG/DL (ref 8.5–10.1)
CHLORIDE SERPL-SCNC: 102 MMOL/L (ref 97–108)
CO2 SERPL-SCNC: 27 MMOL/L (ref 21–32)
CREAT SERPL-MCNC: 0.49 MG/DL (ref 0.7–1.3)
ERYTHROCYTE [DISTWIDTH] IN BLOOD BY AUTOMATED COUNT: 13.4 % (ref 11.5–14.5)
GLUCOSE SERPL-MCNC: 147 MG/DL (ref 65–100)
HCT VFR BLD AUTO: 38.9 % (ref 36.6–50.3)
HGB BLD-MCNC: 13.8 G/DL (ref 12.1–17)
MCH RBC QN AUTO: 30.3 PG (ref 26–34)
MCHC RBC AUTO-ENTMCNC: 35.5 G/DL (ref 30–36.5)
MCV RBC AUTO: 85.5 FL (ref 80–99)
NRBC # BLD: 0 K/UL (ref 0–0.01)
NRBC BLD-RTO: 0 PER 100 WBC
PLATELET # BLD AUTO: 249 K/UL (ref 150–400)
PMV BLD AUTO: 9.5 FL (ref 8.9–12.9)
POTASSIUM SERPL-SCNC: 3 MMOL/L (ref 3.5–5.1)
RBC # BLD AUTO: 4.55 M/UL (ref 4.1–5.7)
SODIUM SERPL-SCNC: 138 MMOL/L (ref 136–145)
WBC # BLD AUTO: 8 K/UL (ref 4.1–11.1)

## 2021-08-03 PROCEDURE — 80048 BASIC METABOLIC PNL TOTAL CA: CPT

## 2021-08-03 PROCEDURE — 74011250636 HC RX REV CODE- 250/636: Performed by: FAMILY MEDICINE

## 2021-08-03 PROCEDURE — 65270000029 HC RM PRIVATE

## 2021-08-03 PROCEDURE — 85027 COMPLETE CBC AUTOMATED: CPT

## 2021-08-03 PROCEDURE — 74011250636 HC RX REV CODE- 250/636: Performed by: INTERNAL MEDICINE

## 2021-08-03 PROCEDURE — 74011250637 HC RX REV CODE- 250/637: Performed by: INTERNAL MEDICINE

## 2021-08-03 PROCEDURE — 74011000250 HC RX REV CODE- 250: Performed by: INTERNAL MEDICINE

## 2021-08-03 PROCEDURE — 36415 COLL VENOUS BLD VENIPUNCTURE: CPT

## 2021-08-03 PROCEDURE — 74011250637 HC RX REV CODE- 250/637: Performed by: FAMILY MEDICINE

## 2021-08-03 RX ORDER — POTASSIUM CHLORIDE 14.9 MG/ML
10 INJECTION INTRAVENOUS
Status: DISPENSED | OUTPATIENT
Start: 2021-08-03 | End: 2021-08-03

## 2021-08-03 RX ORDER — POTASSIUM CHLORIDE 750 MG/1
40 TABLET, FILM COATED, EXTENDED RELEASE ORAL
Status: DISCONTINUED | OUTPATIENT
Start: 2021-08-03 | End: 2021-08-03

## 2021-08-03 RX ADMIN — POTASSIUM CHLORIDE 10 MEQ: 14.9 INJECTION, SOLUTION INTRAVENOUS at 17:48

## 2021-08-03 RX ADMIN — POTASSIUM CHLORIDE 10 MEQ: 14.9 INJECTION, SOLUTION INTRAVENOUS at 19:00

## 2021-08-03 RX ADMIN — DEXTROSE MONOHYDRATE 50 ML/HR: 50 INJECTION, SOLUTION INTRAVENOUS at 17:47

## 2021-08-03 RX ADMIN — POTASSIUM CHLORIDE 10 MEQ: 14.9 INJECTION, SOLUTION INTRAVENOUS at 19:12

## 2021-08-03 RX ADMIN — SERTRALINE 25 MG: 50 TABLET, FILM COATED ORAL at 10:41

## 2021-08-03 RX ADMIN — FAMOTIDINE 20 MG: 10 INJECTION, SOLUTION INTRAVENOUS at 00:52

## 2021-08-03 RX ADMIN — FINASTERIDE 5 MG: 5 TABLET, FILM COATED ORAL at 09:00

## 2021-08-03 RX ADMIN — FAMOTIDINE 20 MG: 10 INJECTION, SOLUTION INTRAVENOUS at 10:41

## 2021-08-03 RX ADMIN — FAMOTIDINE 20 MG: 10 INJECTION, SOLUTION INTRAVENOUS at 22:38

## 2021-08-03 RX ADMIN — ENOXAPARIN SODIUM 40 MG: 40 INJECTION SUBCUTANEOUS at 10:41

## 2021-08-03 RX ADMIN — TAMSULOSIN HYDROCHLORIDE 0.4 MG: 0.4 CAPSULE ORAL at 10:45

## 2021-08-03 RX ADMIN — LEVOTHYROXINE SODIUM 50 MCG: 0.05 TABLET ORAL at 07:58

## 2021-08-03 NOTE — PROGRESS NOTES
[facility] [facility]  Hospitalist Group                                                                                          Hospitalist Progress Note  Gosia He MD  Answering service: [phone] OR [phone] from in house phone      NAME:  Ney Rogers  :  3/23/1933  MRN:  [id number]      Admission Summary:   The patient is an 49-year-old male with longstanding history of dementia of Alzheimer's type. The patient is in a Memory Unit because of his agitation and psych issues, the patient was being transferred to the Psychiatry unit at [facility]. In [facility], the facility had wanted a physical and routine labs to be done at a facility before accepting the patient. The patient was evaluated in the ER. The patient upon arrival to the [facility] ER was extremely combative and agitated, received Geodon and some Ativan after which he became less combative and manageable. The patient is unable to give any history in the ER. Further questioning from the family members, it was noted that he is scheduled to undergo some TURP and has had intermittent urinary retention for which he has chronic Orta. The patient has a history of chronic urinary tract infections in the past.  The urine that was obtained was extremely dark colored for which he received a dose of vancomycin. The labs showed significantly elevated lactic acidosis of 4.5 for which IV fluids were administered. The patient's lactic acid came down to 1.5 after that. Currently, he is resting comfortably, responds to the verbal and tactile stimuli. No signs of agitation were noted.   No further history can be obtained because of the memory    Interval history / Subjective:   Patient altered  Following some commands for me this am  In restraints and received IV haldol overnight     Assessment & Plan:     1.   dementia with psychosis and agitation, - due to catheter associated UTI with hospital delerium- changed abx to meropenem, monitor w treatment  2. Lactic acidosis, resolved. 3.  Dementia of Alzheimer's type,   Unable to take PO meds- added exelon patch-   4. History of benign prostatic hypertrophy abd chronic rodriguez,   continue with the Flomax and Proscar. Urology consult- was scheduled for TURP on Aug 11th at [facility] w Dr Jase Kerr  5. Dementia with psychosis, continue with Haldol and Geodon p.r.n.  - stop seroquel, psych consulted= recs noted  6. Hypothyroidism, continue with Synthroid. Code status: Full Code  DVT prophylaxis: Lovenox    Care Plan discussed with: Patient/Family  Anticipated Disposition: SNF/LTC  Anticipated Discharge: 24 hours to 48 hours     Hospital Problems  Never Reviewed        Codes Class Noted POA    Lactic acidosis ICD-10-CM: E87.2  ICD-9-CM: 276.2  7/28/2021 Unknown                Review of Systems:   A comprehensive review of systems was negative except for that written in the HPI. Vital Signs:    Last 24hrs VS reviewed since prior progress note. Most recent are:  Visit Vitals  BP (!) 143/81 (BP 1 Location: Right upper arm, BP Patient Position: At rest)   Pulse (!) 115   Temp 97.5 °F (36.4 °C)   Resp 16   Ht 5' 7\" (1.702 m)   Wt 55.9 kg (123 lb 3.8 oz)   SpO2 96%   BMI 19.30 kg/m²     Patient Vitals for the past 24 hrs:   Temp Pulse Resp BP SpO2   08/02/21 2157 97.5 °F (36.4 °C) (!) 115 16 (!) 143/81    08/02/21 1337 97.8 °F (36.6 °C) 98 16 (!) 161/88 96 %   08/02/21 0810 97.8 °F (36.6 °C) (!) 103 14 (!) 140/85 95 %       Intake/Output Summary (Last 24 hours) at 8/2/2021 2346  Last data filed at 8/2/2021 7400  Gross per 24 hour   Intake 800 ml   Output    Net 800 ml        Physical Examination:     I had a face to face encounter with this patient and independently examined them on 8/2/2021 as outlined below:          Constitutional:  awake and alert    ENT:  Oral mucosa dry. Resp:  CTA bilaterally. No wheezing/rhonchi/rales.  No accessory muscle use   CV:  Regular rhythm, normal rate, no murmurs, gallops, rubs    GI:  Soft, non distended, non tender. Musculoskeletal:  No edema, warm, diffuse muscle wasting    Neurologic:  Moves all extremities - no focal neuro deficits            Data Review:    Review and/or order of clinical lab test  Review and/or order of tests in the radiology section of CPT  Review and/or order of tests in the medicine section of [facility]      Labs:     No results for input(s): WBC, HGB, HCT, PLT, HGBEXT, HCTEXT, PLTEXT, HGBEXT, HCTEXT, PLTEXT in the last 72 hours. Recent Labs     08/01/21  0336 07/31/21  0232    139   K 2.6* 3.4*    108   CO2 26 21   BUN 10 11   CREA 0.76 0.61*   * 151*   CA 8.5 8.7     No results for input(s): ALT, AP, TBIL, TBILI, TP, ALB, GLOB, GGT, AML, LPSE in the last 72 hours. No lab exists for component: SGOT, GPT, AMYP, HLPSE  No results for input(s): INR, PTP, APTT, INREXT, INREXT in the last 72 hours. No results for input(s): FE, TIBC, PSAT, FERR in the last 72 hours. No results found for: FOL, RBCF   No results for input(s): PH, PCO2, PO2 in the last 72 hours. No results for input(s): CPK, CKNDX, TROIQ in the last 72 hours.     No lab exists for component: CPKMB  No results found for: CHOL, CHOLX, CHLST, CHOLV, HDL, HDLP, LDL, LDLC, DLDLP, TGLX, TRIGL, TRIGP, CHHD, CHHDX  No results found for: [facility]  Lab Results   Component Value Date/Time    Color STRAW 07/28/2021 08:44 PM    Appearance TURBID (A) 07/28/2021 08:44 PM    Specific gravity 1.020 07/28/2021 08:44 PM    Specific gravity 1.030 06/17/2021 11:06 PM    pH (UA) 5.0 07/28/2021 08:44 PM    Protein 100 (A) 07/28/2021 08:44 PM    Glucose Negative 07/28/2021 08:44 PM    Ketone >80 (A) 07/28/2021 08:44 PM    Bilirubin Negative 07/28/2021 08:44 PM    Urobilinogen 0.2 07/28/2021 08:44 PM    Nitrites Negative 07/28/2021 08:44 PM    Leukocyte Esterase LARGE (A) 07/28/2021 08:44 PM    Epithelial cells FEW 07/28/2021 08:44 PM    Bacteria Negative 07/28/2021 08:44 PM    WBC 20-50 07/28/2021 08:44 PM    RBC 20-50 07/28/2021 08:44 PM         Medications Reviewed:     Current Facility-Administered Medications   Medication Dose Route Frequency    famotidine (PF) (PEPCID) 20 mg in 0.9% sodium chloride 10 mL injection  20 mg IntraVENous Q12H    ziprasidone (GEODON) 10 mg in sterile water (preservative free) 0.5 mL injection  10 mg IntraMUSCular Q6H PRN    rivastigmine (EXELON) 9.5 mg/24 hour patch 1 Patch  1 Patch TransDERmal DAILY    levothyroxine (SYNTHROID) tablet 50 mcg  50 mcg Oral ACB    [Held by provider] QUEtiapine (SEROquel) tablet 25 mg  25 mg Oral TID    haloperidol lactate (HALDOL) injection 2 mg  2 mg IntraMUSCular Q6H PRN    dextrose 5% infusion  50 mL/hr IntraVENous CONTINUOUS    sodium chloride (NS) flush 5-40 mL  5-40 mL IntraVENous PRN    acetaminophen (TYLENOL) tablet 650 mg  650 mg Oral Q6H PRN    Or    acetaminophen (TYLENOL) suppository 650 mg  650 mg Rectal Q6H PRN    enoxaparin (LOVENOX) injection 40 mg  40 mg SubCUTAneous DAILY    bisacodyL (DULCOLAX) suppository 10 mg  10 mg Rectal DAILY PRN    finasteride (PROSCAR) tablet 5 mg  5 mg Oral DAILY    sertraline (ZOLOFT) tablet 25 mg  25 mg Oral DAILY    tamsulosin (FLOMAX) capsule 0.4 mg  0.4 mg Oral DAILY     ______________________________________________________________________  EXPECTED LENGTH OF STAY: - - -  ACTUAL LENGTH OF STAY:          5                 Sedrick Christianson MD

## 2021-08-03 NOTE — PROGRESS NOTES
CM spoke with MD. Plan will be for patient to be transferred to [facility], possibly as soon as today. Accepting MD is Dr. Manoj Peacock. CM completed CM portion of Em[facility]. Once bed is confirmed, nursing will need to finish [facility] and and [facility] phone# [phone] ambulance transport time will need to be secured. CM placed [facility] on will call, and updated nursing.      RAYSHAWN Truong CaroW/CRM

## 2021-08-04 LAB
ANION GAP SERPL CALC-SCNC: 6 MMOL/L (ref 5–15)
APPEARANCE UR: ABNORMAL
APTT PPP: 29 SEC (ref 22.1–31)
BACTERIA URNS QL MICRO: NEGATIVE /HPF
BILIRUB UR QL CFM: NEGATIVE
BUN SERPL-MCNC: 12 MG/DL (ref 6–20)
BUN/CREAT SERPL: 22 (ref 12–20)
CALCIUM SERPL-MCNC: 9.2 MG/DL (ref 8.5–10.1)
CHLORIDE SERPL-SCNC: 102 MMOL/L (ref 97–108)
CO2 SERPL-SCNC: 28 MMOL/L (ref 21–32)
COLOR UR: ABNORMAL
CREAT SERPL-MCNC: 0.55 MG/DL (ref 0.7–1.3)
EPITH CASTS URNS QL MICRO: ABNORMAL /LPF
ERYTHROCYTE [DISTWIDTH] IN BLOOD BY AUTOMATED COUNT: 13.2 % (ref 11.5–14.5)
FIBRINOGEN PPP-MCNC: 348 MG/DL (ref 200–475)
GLUCOSE SERPL-MCNC: 146 MG/DL (ref 65–100)
GLUCOSE UR STRIP.AUTO-MCNC: NEGATIVE MG/DL
HCT VFR BLD AUTO: 37.5 % (ref 36.6–50.3)
HGB BLD-MCNC: 13 G/DL (ref 12.1–17)
HGB UR QL STRIP: ABNORMAL
INR PPP: 1.1 (ref 0.9–1.1)
KETONES UR QL STRIP.AUTO: ABNORMAL MG/DL
LEUKOCYTE ESTERASE UR QL STRIP.AUTO: ABNORMAL
MCH RBC QN AUTO: 30 PG (ref 26–34)
MCHC RBC AUTO-ENTMCNC: 34.7 G/DL (ref 30–36.5)
MCV RBC AUTO: 86.4 FL (ref 80–99)
NITRITE UR QL STRIP.AUTO: POSITIVE
NRBC # BLD: 0 K/UL (ref 0–0.01)
NRBC BLD-RTO: 0 PER 100 WBC
PH UR STRIP: 6 [PH] (ref 5–8)
PLATELET # BLD AUTO: 217 K/UL (ref 150–400)
PMV BLD AUTO: 9.8 FL (ref 8.9–12.9)
POTASSIUM SERPL-SCNC: 3.2 MMOL/L (ref 3.5–5.1)
PROT UR STRIP-MCNC: 300 MG/DL
PROTHROMBIN TIME: 11.1 SEC (ref 9–11.1)
RBC # BLD AUTO: 4.34 M/UL (ref 4.1–5.7)
RBC #/AREA URNS HPF: >100 /HPF (ref 0–5)
SODIUM SERPL-SCNC: 136 MMOL/L (ref 136–145)
SP GR UR REFRACTOMETRY: 1.01 (ref 1–1.03)
THERAPEUTIC RANGE,PTTT: NORMAL SECS (ref 58–77)
UROBILINOGEN UR QL STRIP.AUTO: 0.2 EU/DL (ref 0.2–1)
WBC # BLD AUTO: 7.4 K/UL (ref 4.1–11.1)
WBC URNS QL MICRO: ABNORMAL /HPF (ref 0–4)

## 2021-08-04 PROCEDURE — 81001 URINALYSIS AUTO W/SCOPE: CPT

## 2021-08-04 PROCEDURE — 74011250636 HC RX REV CODE- 250/636: Performed by: FAMILY MEDICINE

## 2021-08-04 PROCEDURE — 74011250637 HC RX REV CODE- 250/637: Performed by: FAMILY MEDICINE

## 2021-08-04 PROCEDURE — 74011000250 HC RX REV CODE- 250: Performed by: INTERNAL MEDICINE

## 2021-08-04 PROCEDURE — 74011250636 HC RX REV CODE- 250/636: Performed by: INTERNAL MEDICINE

## 2021-08-04 PROCEDURE — 65270000029 HC RM PRIVATE

## 2021-08-04 PROCEDURE — 85730 THROMBOPLASTIN TIME PARTIAL: CPT

## 2021-08-04 PROCEDURE — 87086 URINE CULTURE/COLONY COUNT: CPT

## 2021-08-04 PROCEDURE — 85027 COMPLETE CBC AUTOMATED: CPT

## 2021-08-04 PROCEDURE — 36415 COLL VENOUS BLD VENIPUNCTURE: CPT

## 2021-08-04 PROCEDURE — 80048 BASIC METABOLIC PNL TOTAL CA: CPT

## 2021-08-04 RX ORDER — HALOPERIDOL 5 MG/ML
1 INJECTION INTRAMUSCULAR
Status: DISCONTINUED | OUTPATIENT
Start: 2021-08-04 | End: 2021-08-09

## 2021-08-04 RX ADMIN — Medication 10 ML: at 21:21

## 2021-08-04 RX ADMIN — FAMOTIDINE 20 MG: 10 INJECTION, SOLUTION INTRAVENOUS at 09:20

## 2021-08-04 RX ADMIN — FAMOTIDINE 20 MG: 10 INJECTION, SOLUTION INTRAVENOUS at 21:21

## 2021-08-04 RX ADMIN — ENOXAPARIN SODIUM 40 MG: 40 INJECTION SUBCUTANEOUS at 09:20

## 2021-08-04 RX ADMIN — DEXTROSE MONOHYDRATE 50 ML/HR: 50 INJECTION, SOLUTION INTRAVENOUS at 17:46

## 2021-08-04 NOTE — PROGRESS NOTES
Attempted to give patient some juice. Pt turns head away and purses lips. Unable to give any PO medicationns at this time.

## 2021-08-04 NOTE — PROGRESS NOTES
Bedside and Verbal shift change report given to [address] (oncoming nurse) by Jesús Wilson (offgoing nurse). Report included the following information SBAR, Kardex, Intake/Output and MAR.

## 2021-08-04 NOTE — PROGRESS NOTES
Spiritual Care Assessment/Progress Note  [facility]      NAME: Lilia Fleming      MRN: [id number]  AGE: 80 y.o. SEX: male  Restorationism Affiliation: Adventist   Language: English     8/4/2021     Total Time (in minutes): 5     Spiritual Assessment begun in [address] through conversation with:         []Patient        [] Family    [] Friend(s)        Reason for Consult: Initial/Spiritual assessment, patient floor     Spiritual beliefs: (Please include comment if needed)     [] Identifies with a cj tradition:         [] Supported by a cj community:            [] Claims no spiritual orientation:           [] Seeking spiritual identity:                [] Adheres to an individual form of spirituality:           [x] Not able to assess:                           Identified resources for coping:      [] Prayer                               [] Music                  [] Guided Imagery     [] Family/friends                 [] Pet visits     [] Devotional reading                         [x] Unknown     [] Other:                                              Interventions offered during this visit: (See comments for more details)                Plan of Care:     [] Support spiritual and/or cultural needs    [] Support AMD and/or advance care planning process      [] Support grieving process   [] Coordinate Rites and/or Rituals    [] Coordination with community clergy   [] No spiritual needs identified at this time   [] Detailed Plan of Care below (See Comments)  [] Make referral to Music Therapy  [] Make referral to Pet Therapy     [] Make referral to Addiction services  [] Make referral to [facility]  [] Make referral to Spiritual Care Partner  [] No future visits requested        [x] Follow up upon further referrals     Comments:  visit for initial spiritual assessment. Patient resting, appears comfortable. Please contact spiritual care for further referral or consult. Rev.  Samantha Fisher, Charlette, Elise, [facility]   paging service: [phone])

## 2021-08-04 NOTE — PROGRESS NOTES
Reviewed note from urology-   due to chronic urinary retention we will not be attempting a voiding trial here prior to transfer of patient to [place] for his surgery next week  I was called by [facility] this afternoon and they confirmed his scheduled surgery next week on 8/11/21  The gave me a list of preop labs/workup that needed to be completed since patient did not make his preop appointment at [facility] Monday  That list included CBC, BMP, UA with urine culture, EKG, CXR, PT/PTT, type and screen  We have already done all of this except the coags and type and screen- will order coags, repeat the UA and urine culture, type and screen with have to be done over at [facility].   Family will  patient and take him over to [facility] for his procedure early next week  I told the son that we would print out the above items and give it to him on discharge for the admission at [facility]

## 2021-08-04 NOTE — PROGRESS NOTES
Urology:     Chart reviewed and patient seen. Patient in bilateral wrist restraints and not interactive. ROS and hx collection limited. Patient is awaiting transfer to [place] for scheduled TURP on 8/11/21 with Dr. Case Goldsmith. After discussion with the Hospitalist, it appears that he has been accepted for admission by the Hospitalist at [facility], however, his transfer has been held up by the transfer center. He is medically stable for discharge; however, SNF/LTC facilities are denying acceptance with a rodriguez catheter in place. A/P:  Hx of BPH and chronic rodriguez awaiting TURP    - Can DC rodriguez with voiding trial early in the AM on 8/5/21. Discussed with staff. If he passes the voiding trial, can dc to SNF/LTC until scheduled surgery next week. If he fails his voiding trial, will re-insert rodriguez and can continue to try to transfer. If at that point, the patient remains inpatient at [facility] on Monday, can discuss with Dr. Case Goldsmith to determine if he can do the case at [facility] or maintain at [facility] as planned. - Continue flomax and proscar.  Will follow tomorrow AM on results of voiding trial.     Discussed with Dr. Khushbu De La Fuente

## 2021-08-04 NOTE — PROGRESS NOTES
[address] [facility] Adult  Hospitalist Group                                                                                          Hospitalist Progress Note  Kenyetta Vega MD  Answering service: [phone] OR [phone] from in house phone      NAME:  Rosario Huerta  :  3/23/1933  MRN:  [id number]      Admission Summary:   The patient is an 80-year-old male with longstanding history of dementia of Alzheimer's type. The patient is in a Memory Unit because of his agitation and psych issues, the patient was being transferred to the Psychiatry unit at [facility]. In [facility], the facility had wanted a physical and routine labs to be done at a facility before accepting the patient. The patient was evaluated in the ER. The patient upon arrival to the [facility] ER was extremely combative and agitated, received Geodon and some Ativan after which he became less combative and manageable. The patient is unable to give any history in the ER. Further questioning from the family members, it was noted that he is scheduled to undergo some TURP and has had intermittent urinary retention for which he has chronic Orta. The patient has a history of chronic urinary tract infections in the past.  The urine that was obtained was extremely dark colored for which he received a dose of vancomycin. The labs showed significantly elevated lactic acidosis of 4.5 for which IV fluids were administered. The patient's lactic acid came down to 1.5 after that. Currently, he is resting comfortably, responds to the verbal and tactile stimuli. No signs of agitation were noted.   No further history can be obtained because of the memory    Interval history / Subjective:   Patient altered  Following some commands for me this am  In restraints and received IV haldol overnight     Assessment & Plan:     1.   dementia with psychosis    agitation appeared to be due catheter associated UTI with hospital delerium-   changed abx to meropenem, stable behavior after treatment  2. Lactic acidosis, resolved. 3.  Dementia of Alzheimer's type,   Unable to take PO meds- cont exelon patch-   Has not needed haldol or geodon in 2 days  4. History of benign prostatic hypertrophy abd chronic rodriguez,   continue with the Flomax and Proscar. Urology consult- was scheduled for TURP on Aug 11th at [facility] w Dr April Ibarra  Working on transfer of patient to [facility]  5. Hypothyroidism, continue with Synthroid. Code status: Full Code  DVT prophylaxis: Lovenox    Care Plan discussed with: Patient/Family  Anticipated Disposition: SNF/LTC  Anticipated Discharge: 24 hours to 48 hours     Hospital Problems  Never Reviewed        Codes Class Noted POA    Lactic acidosis ICD-10-CM: E87.2  ICD-9-CM: 276.2  7/28/2021 Unknown                Review of Systems:   A comprehensive review of systems was negative except for that written in the HPI. Vital Signs:    Last 24hrs VS reviewed since prior progress note. Most recent are:  Visit Vitals  BP (!) 148/81 (BP 1 Location: Right upper arm, BP Patient Position: At rest)   Pulse 97   Temp 98 °F (36.7 °C)   Resp 16   Ht 5' 7\" (1.702 m)   Wt 55.9 kg (123 lb 3.8 oz)   SpO2 94%   BMI 19.30 kg/m²     Patient Vitals for the past 24 hrs:   Temp Pulse Resp BP SpO2   08/03/21 2136 98 °F (36.7 °C) 97 16 (!) 148/81 94 %   08/03/21 1431 98.1 °F (36.7 °C) 91 16 (!) 101/57 96 %   08/03/21 1038 98 °F (36.7 °C) (!) 115 16 133/68 95 %       Intake/Output Summary (Last 24 hours) at 8/4/2021 0031  Last data filed at 8/3/2021 1038  Gross per 24 hour   Intake    Output 450 ml   Net -450 ml        Physical Examination:        Constitutional:  sleepy but pleasant today, very weak    ENT:  Oral mucosa dry. Resp:  CTA bilaterally. No wheezing/rhonchi/rales. No accessory muscle use   CV:  Regular rhythm, normal rate, no murmurs, gallops, rubs    GI:  Soft, non distended, non tender.      Musculoskeletal:  No edema, warm, diffuse muscle wasting    Neurologic:  Moves all extremities - no focal neuro deficits  Psych: demented without agitation, flat affect            Data Review:    Review and/or order of clinical lab test  Review and/or order of tests in the radiology section of CPT  Review and/or order of tests in the medicine section of CPT      Labs:     Recent Labs     08/03/21  0423   WBC 8.0   HGB 13.8   HCT 38.9        Recent Labs     08/03/21  0423 08/01/21  0336    138   K 3.0* 2.6*    104   CO2 27 26   BUN 12 10   CREA 0.49* 0.76   * 135*   CA 9.2 8.5     No results for input(s): ALT, AP, TBIL, TBILI, TP, ALB, GLOB, GGT, AML, LPSE in the last 72 hours. No lab exists for component: SGOT, GPT, AMYP, HLPSE  No results for input(s): INR, PTP, APTT, INREXT, INREXT in the last 72 hours. No results for input(s): FE, TIBC, PSAT, FERR in the last 72 hours. No results found for: FOL, RBCF   No results for input(s): PH, PCO2, PO2 in the last 72 hours. No results for input(s): CPK, CKNDX, TROIQ in the last 72 hours.     No lab exists for component: CPKMB  No results found for: CHOL, CHOLX, CHLST, CHOLV, HDL, HDLP, LDL, LDLC, DLDLP, TGLX, TRIGL, TRIGP, CHHD, CHHDX  No results found for: [facility]  Lab Results   Component Value Date/Time    Color STRAW 07/28/2021 08:44 PM    Appearance TURBID (A) 07/28/2021 08:44 PM    Specific gravity 1.020 07/28/2021 08:44 PM    Specific gravity 1.030 06/17/2021 11:06 PM    pH (UA) 5.0 07/28/2021 08:44 PM    Protein 100 (A) 07/28/2021 08:44 PM    Glucose Negative 07/28/2021 08:44 PM    Ketone >80 (A) 07/28/2021 08:44 PM    Bilirubin Negative 07/28/2021 08:44 PM    Urobilinogen 0.2 07/28/2021 08:44 PM    Nitrites Negative 07/28/2021 08:44 PM    Leukocyte Esterase LARGE (A) 07/28/2021 08:44 PM    Epithelial cells FEW 07/28/2021 08:44 PM    Bacteria Negative 07/28/2021 08:44 PM    WBC 20-50 07/28/2021 08:44 PM    RBC 20-50 07/28/2021 08:44 PM         Medications Reviewed: Current Facility-Administered Medications   Medication Dose Route Frequency    famotidine (PF) (PEPCID) 20 mg in 0.9% sodium chloride 10 mL injection  20 mg IntraVENous Q12H    ziprasidone (GEODON) 10 mg in sterile water (preservative free) 0.5 mL injection  10 mg IntraMUSCular Q6H PRN    rivastigmine (EXELON) 9.5 mg/24 hour patch 1 Patch  1 Patch TransDERmal DAILY    levothyroxine (SYNTHROID) tablet 50 mcg  50 mcg Oral ACB    [Held by provider] QUEtiapine (SEROquel) tablet 25 mg  25 mg Oral TID    haloperidol lactate (HALDOL) injection 2 mg  2 mg IntraMUSCular Q6H PRN    dextrose 5% infusion  50 mL/hr IntraVENous CONTINUOUS    sodium chloride (NS) flush 5-40 mL  5-40 mL IntraVENous PRN    acetaminophen (TYLENOL) tablet 650 mg  650 mg Oral Q6H PRN    Or    acetaminophen (TYLENOL) suppository 650 mg  650 mg Rectal Q6H PRN    enoxaparin (LOVENOX) injection 40 mg  40 mg SubCUTAneous DAILY    bisacodyL (DULCOLAX) suppository 10 mg  10 mg Rectal DAILY PRN    finasteride (PROSCAR) tablet 5 mg  5 mg Oral DAILY    sertraline (ZOLOFT) tablet 25 mg  25 mg Oral DAILY    tamsulosin (FLOMAX) capsule 0.4 mg  0.4 mg Oral DAILY     ______________________________________________________________________  EXPECTED LENGTH OF STAY: - - -  ACTUAL LENGTH OF STAY:          7                 Romario Morales MD

## 2021-08-04 NOTE — PROGRESS NOTES
Bedside and Verbal shift change report given to Edgardo RN (oncoming nurse) by KARTIK Bernard (offgoing nurse). Report included the following information SBAR, Kardex, MAR and Recent Results.

## 2021-08-05 LAB
ANION GAP SERPL CALC-SCNC: 7 MMOL/L (ref 5–15)
BACTERIA SPEC CULT: NORMAL
BUN SERPL-MCNC: 11 MG/DL (ref 6–20)
BUN/CREAT SERPL: 18 (ref 12–20)
CALCIUM SERPL-MCNC: 8.9 MG/DL (ref 8.5–10.1)
CHLORIDE SERPL-SCNC: 103 MMOL/L (ref 97–108)
CO2 SERPL-SCNC: 29 MMOL/L (ref 21–32)
CREAT SERPL-MCNC: 0.6 MG/DL (ref 0.7–1.3)
ERYTHROCYTE [DISTWIDTH] IN BLOOD BY AUTOMATED COUNT: 13.2 % (ref 11.5–14.5)
GLUCOSE BLD STRIP.AUTO-MCNC: 138 MG/DL (ref 65–117)
GLUCOSE SERPL-MCNC: 136 MG/DL (ref 65–100)
HCT VFR BLD AUTO: 36.6 % (ref 36.6–50.3)
HGB BLD-MCNC: 12.7 G/DL (ref 12.1–17)
MAGNESIUM SERPL-MCNC: 1.8 MG/DL (ref 1.6–2.4)
MCH RBC QN AUTO: 30.2 PG (ref 26–34)
MCHC RBC AUTO-ENTMCNC: 34.7 G/DL (ref 30–36.5)
MCV RBC AUTO: 86.9 FL (ref 80–99)
NRBC # BLD: 0 K/UL (ref 0–0.01)
NRBC BLD-RTO: 0 PER 100 WBC
PLATELET # BLD AUTO: 216 K/UL (ref 150–400)
PMV BLD AUTO: 9.9 FL (ref 8.9–12.9)
POTASSIUM SERPL-SCNC: 3.2 MMOL/L (ref 3.5–5.1)
RBC # BLD AUTO: 4.21 M/UL (ref 4.1–5.7)
SERVICE CMNT-IMP: ABNORMAL
SERVICE CMNT-IMP: NORMAL
SODIUM SERPL-SCNC: 139 MMOL/L (ref 136–145)
WBC # BLD AUTO: 7.6 K/UL (ref 4.1–11.1)

## 2021-08-05 PROCEDURE — 65270000029 HC RM PRIVATE

## 2021-08-05 PROCEDURE — 82962 GLUCOSE BLOOD TEST: CPT

## 2021-08-05 PROCEDURE — 83735 ASSAY OF MAGNESIUM: CPT

## 2021-08-05 PROCEDURE — 80048 BASIC METABOLIC PNL TOTAL CA: CPT

## 2021-08-05 PROCEDURE — 36415 COLL VENOUS BLD VENIPUNCTURE: CPT

## 2021-08-05 PROCEDURE — 74011250637 HC RX REV CODE- 250/637: Performed by: FAMILY MEDICINE

## 2021-08-05 PROCEDURE — 74011250636 HC RX REV CODE- 250/636: Performed by: INTERNAL MEDICINE

## 2021-08-05 PROCEDURE — 85027 COMPLETE CBC AUTOMATED: CPT

## 2021-08-05 PROCEDURE — 74011000250 HC RX REV CODE- 250: Performed by: INTERNAL MEDICINE

## 2021-08-05 RX ORDER — POTASSIUM CHLORIDE 7.45 MG/ML
10 INJECTION INTRAVENOUS
Status: COMPLETED | OUTPATIENT
Start: 2021-08-05 | End: 2021-08-06

## 2021-08-05 RX ORDER — POTASSIUM CHLORIDE 7.45 MG/ML
10 INJECTION INTRAVENOUS
Status: ACTIVE | OUTPATIENT
Start: 2021-08-05 | End: 2021-08-05

## 2021-08-05 RX ADMIN — POTASSIUM CHLORIDE 10 MEQ: 7.46 INJECTION, SOLUTION INTRAVENOUS at 22:23

## 2021-08-05 RX ADMIN — POTASSIUM CHLORIDE 10 MEQ: 7.46 INJECTION, SOLUTION INTRAVENOUS at 20:59

## 2021-08-05 RX ADMIN — FAMOTIDINE 20 MG: 10 INJECTION, SOLUTION INTRAVENOUS at 22:23

## 2021-08-05 RX ADMIN — POTASSIUM CHLORIDE 10 MEQ: 7.46 INJECTION, SOLUTION INTRAVENOUS at 23:32

## 2021-08-05 NOTE — PROGRESS NOTES
[facility]                                                                                          Hospitalist Progress Note  Stephen Cooney MD  Answering service: [phone] from in house phone      NAME:  Latoya Mcgovern  :  3/23/1933  MRN:  [id number]      Admission Summary: \"The patient is an 66-year-old male with longstanding history of dementia of Alzheimer's type. The patient is in a Memory Unit because of his agitation and psych issues, the patient was being transferred to the Psychiatry unit at [facility]. In [facility], the facility had wanted a physical and routine labs to be done at a facility before accepting the patient. The patient was evaluated in the ER. The patient upon arrival to the [address] [place] [address] ER was extremely combative and agitated, received Geodon and some Ativan after which he became less combative and manageable. The patient is unable to give any history in the ER. Further questioning from the family members, it was noted that he is scheduled to undergo some TURP and has had intermittent urinary retention for which he has chronic Orta. The patient has a history of chronic urinary tract infections in the past.  The urine that was obtained was extremely dark colored for which he received a dose of vancomycin. The labs showed significantly elevated lactic acidosis of 4.5 for which IV fluids were administered. The patient's lactic acid came down to 1.5 after that. Currently, he is resting comfortably, responds to the verbal and tactile stimuli. No signs of agitation were noted. No further history can be obtained because of the memory\"    Interval history / Subjective:   Pt is awake and quite confused. He is not following commands and not answering questions coherently. He has known dementia. Nurse reports that he has been declining to eat or take his meds.  Ordered Palliative care consult for goals of care given current state.     Assessment & Plan:     Per prior hospitalist note:   \"1.   dementia with psychosis    initially was agitated which appears to be due catheter associated UTI  changed abx to meropenem, stable= non-agressive behavior after treatment  2. Lactic acidosis, resolved. 3.  Dementia of Alzheimer's type,   Unable to take PO meds- cont exelon patch-   Has not needed haldol or geodon in 3 days  seroquel was discontinued  4. History of benign prostatic hypertrophy and chronic rodriguez,   continue with the Flomax and Proscar. - instructed nursing- Do not remove rodriguez   Urology consulted- Patient scheduled for TURP on Aug 11th at [facility] Dr Petey Dickey  Working on transfer of patient to [facility]- transfer center has been called  Urology refusing to do the procedure here despite multiple attempts to avoid the transfer\"  ------------------------------------------------  Excerpt of addendum note by prior hospitalist Dr. Jami Campoverde  8/4/2021:  \"Reviewed note from urology-   due to chronic urinary retention we will not be attempting a voiding trial here prior to transfer of patient to [place] for his surgery next week  I was called by [facility] this afternoon and they confirmed his scheduled surgery next week on 8/11/21  The gave me a list of preop labs/workup that needed to be completed since patient did not make his preop appointment at [facility] last Monday  That list included CBC, BMP, UA with urine culture, EKG, CXR, PT/PTT, type and screen  We have already done all of this except the coags and type and screen- will order coags, repeat the UA and urine culture, type and screen with have to be done over at [facility].   Family will  patient and take him over to [facility] for his procedure early next week  I told the son that we would print out the above items and give it to him on discharge for the admission at [facility]\"  ---------------------------------------    Excerpt of note by ESPERANZA 8/3/2021:  [facility] spoke with MD. Plan will be for patient to be transferred to [facility], possibly as soon as today. Accepting MD is Dr. Avelina Spence. CM completed CM portion of Em[facility]. Once bed is confirmed, nursing will need to finish [facility] and and [facility] phone# [phone] ambulance transport time will need to be secured. CM placed [facility] on will call, and updated nursing. \"  ---->8/5 case management (CM) reports that pt cannot be transferred directly to [place], related to bed availability    --------------------------------------------    5. Hypothyroidism, on thyroid replacement  6. Dysphagia and malnutrition due to dementia and poor PO intake  -continue dextrose IV bag, 8/5 ordered palliative care consult to assist in goals of care regarding how aggressive to approach (eg dobhoff/peg tube/etc, current code status is full code). 7. Hypokalemia- replete as needed with intermittent labwork    Code status: Full Code  DVT prophylaxis: Lovenox    Care Plan discussed with: patient/nurse/CM  Anticipated Disposition: tbd  Anticipated Discharge: tbd     Hospital Problems  Never Reviewed        Codes Class Noted POA    Lactic acidosis ICD-10-CM: E87.2  ICD-9-CM: 276.2  7/28/2021 Unknown                Review of Systems:   Unable to obtain due to current state    Vital Signs:    Last 24hrs VS reviewed since prior progress note.  Most recent are:  Visit Vitals  BP (!) 102/58 (BP 1 Location: Right upper arm)   Pulse 89   Temp 97.3 °F (36.3 °C)   Resp 14   Ht 5' 7\" (1.702 m)   Wt 55.9 kg (123 lb 3.8 oz)   SpO2 93%   BMI 19.30 kg/m²     Patient Vitals for the past 24 hrs:   Temp Pulse Resp BP SpO2   08/05/21 0349 97.3 °F (36.3 °C) 89 14 (!) 102/58 93 %   08/04/21 2113 97.6 °F (36.4 °C) 88 16 132/74 99 %   08/04/21 1401 97.4 °F (36.3 °C) 85 16 106/64 97 %       Intake/Output Summary (Last 24 hours) at 8/5/2021 1010  Last data filed at 8/4/2021 1754  Gross per 24 hour   Intake    Output 500 ml   Net -500 ml Physical Examination:        Constitutional:  nad, confused   ENT:  anicteric, supple    Resp:  No accessory muscle use, limited exam as pt does not follow commands to take in deep breaths, no obvious wheezing or rales   CV:  Regular rhythm, normal rate, no rubs    GI:  soft, no grimacing with palpation, no high pitch sounds, bowel sound heard  : chronic rodriguez    Musculoskeletal:  generalized muscle atrophy, no cyanosis, pulses present, moves all extremities    Neurologic:  Moves all extremities, only oriented to self (name),   Psych: baseline dementia, not agitated            Data Review:     Labs:     Recent Labs     08/05/21 0225 08/04/21 0439   WBC 7.6 7.4   HGB 12.7 13.0   HCT 36.6 37.5    217     Recent Labs     08/05/21 0225 08/04/21 0439 08/03/21  0423    136 138   K 3.2* 3.2* 3.0*    102 102   CO2 29 28 27   BUN 11 12 12   CREA 0.60* 0.55* 0.49*   * 146* 147*   CA 8.9 9.2 9.2   MG 1.8  --   --        Recent Labs     08/04/21  1914   INR 1.1   PTP 11.1   APTT 29.0          Medications Reviewed:     ______________________________________________________________________  EXPECTED LENGTH OF STAY: - - -  ACTUAL LENGTH OF STAY:          [address] [place]

## 2021-08-05 NOTE — PROGRESS NOTES
Bedside and Verbal shift change report given to Dionicia Paget (oncoming nurse) by Lisa Hernandez (offgoing nurse). Report included the following information SBAR, Kardex, Intake/Output and MAR.

## 2021-08-05 NOTE — PROGRESS NOTES
RUR 24 %    JERO- TBD- May be a transfer to [place] per attending. The patient is not medically stable per attending. Patient is from [facility]- patient is confused and in restraints at this time. Will follow for transitions of care.     Angi Bergman, [address] [place] [address]

## 2021-08-05 NOTE — CONSULTS
Palliative Medicine Consult  Tono: [phone] ([phone])    Patient Name: Malia Ventura  YOB: 1933    Date of Initial Consult: 8/6/21  Reason for Consult: care decisions   Requesting Provider: Rios Muñoz   Primary Care Physician: Kylee, MD Anna     SUMMARY:   Malia Ventura \"Pop\" is a 80 y.o. with a past history of hypothyroidism, depression and dementia that started approximately 5 years ago, chronic rodriguez catheter for BPH (scheduled for TURP on 8/11/21 w/ Dr Jaime Aiken at [facility], had TURP in 2014) who was admitted on 7/28/2021 from [facility] with aggressive behavior. Was seen at [facility] neuro clinic 3/2021 for cognitive changes that started 5 years ago, cannot do complex activities and requires around the clock care, incontinent w/ urine. Most recently at [facility] and was going to be tx to a psychiatric unit in [place] but required medical evaluation first. In ED was very agitated and required Ativan , Benadryl and Geodon. Had some improvement w/ time, medication management and treatment for UTI (Meropenem). Per family over past month pt has been in ED or hospital 6 times, declining, mult UTIs. Current medical issues leading to Palliative Medicine involvement include: care decisions. Social: Pt  to Gael for 65 years. Had been living together in [facility] until moved to [facility] and since then has not been able to see her regularly as he is in the memory center. He worked at [employer] for 30+ years, loved math puzzles. Liked to watch sports, also enjoyed to robles sometimes after going to [employer]. 2 children- Jennifer Zendejas and Ondina Balderas. Sons are mPOA but they do involve their mother/pt's wife in decisions. PALLIATIVE DIAGNOSES:   1. Severe dementia- was FAST 6D 3/2021  2. Delirium- UTI, malnutrition  3. Feeding difficulties   4. Goals of care        PLAN:   1.  Pt minimally arousable- for past 3 days has not taken po meds and once in a while can take a bite of applesauce. This AM for nursing would not open mouth. 2. Speak to sons/mPOAs Ce Mcgovern and [email or web address]. Pt has had dementia for mult years and when saw [facility] Neuro was stage FAST 6D. Had been declining but did rally so to speak the first weekend of July- was sitting at dining table eating for wife's birthday. However since then has had about 6 ED or hospital stays for UTI, very combative and confused/agitated, poor po intake. Family feels some of this was [facility] and [facility] but then he had to move to memory care where he cannot see his wife of 72 years often- and his dementia progressed more quickly. 3. Plan/goal is to get TURP (aparently has to be at [facility]) on 8/11 and hopefully can then remove chronic rodriguez and UTIs will improve- which have caused much of his agitation. He had a TURP in 2014 that was very successful, although they do realize that pt's body is different than it was then. 4. In the meantime, pt has had to have restraints so not to pull out his NGT and is not taking in enough po to maintain nutrition. 5. Talk about NGT trial, then talk about PEG tube in future and it not being recommended. 6. Pt's AMD very clear that he would not want prolonged efforts at end of life and would not PEG tube w/ severe dementia. Recommendation to consider DNR status. 7. Family realistic that pt may not improve much even after TURP and if they see ongoing decline/repeat hospitalizations- they would consider hospice. 8. Plan:   9. NGT trial over weekend w/ goal of getting to TURP to hopefully remove rodriguez and decr UTIs. 10. If pt pulls out NGT, may need to readdress goals. 11. Family hopeful they can see some improvement, but realistic as well. 15. Family to talk about DNR status, they are leaning towards it. 13. Initial consult note routed to primary continuity provider and/or primary health care team members  15.  Communicated plan of care with: Palliative IDT, [facility] Team incl Ang Ulloa care management, Dr Isidro Rosado and Jasper Grier RN     GOALS OF CARE / TREATMENT PREFERENCES:     GOALS OF CARE:  Patient/Health Care Proxy Stated Goals: Other (comment) (To see if pt's mental status can improve)    TREATMENT PREFERENCES:   Code Status: Full Code    Advance Care Planning:  [x] The [facility] Interdisciplinary Team has updated the ACP Navigator with Health Care Decision Maker and Patient Capacity      Primary Decision Maker: Laya Curiel - [phone]    Primary Decision Maker: Patricia tim [facility] - [phone]  Advance Care Planning 7/30/2021   Confirm Advance Directive Yes, not on file       Medical Interventions: Full interventions   Artificially Administered Nutrition: Feeding tube for a defined trial period     Other:    As far as possible, the palliative care team has discussed with patient / health care proxy about goals of care / treatment preferences for patient. HISTORY:     History obtained from: chart, staff, family     CHIEF COMPLAINT:  Cannot obtain due to patient factors      HPI/SUBJECTIVE:    The patient is:   [] Verbal and participatory  [x] Non-participatory due to: medical condition    Unable to wake up for me.      Clinical Pain Assessment (nonverbal scale for severity on nonverbal patients):   Clinical Pain Assessment  Severity: 0     Activity (Movement): Lying quietly, normal position    Duration: for how long has pt been experiencing pain (e.g., 2 days, 1 month, years)  Frequency: how often pain is an issue (e.g., several times per day, once every few days, constant)     FUNCTIONAL ASSESSMENT:     Palliative Performance Scale (PPS):  PPS: 20       PSYCHOSOCIAL/SPIRITUAL SCREENING:     Palliative IDT has assessed this patient for cultural preferences / practices and a referral made as appropriate to needs (Cultural Services, Patient Advocacy, Ethics, etc.)    Any spiritual / Gnosticism concerns:  [] Yes /  [x] No    Caregiver Burnout:  [] Yes /  [x] No /  [] No Caregiver Present      Anticipatory grief assessment:   [x] Normal  / [] Maladaptive       ESAS Anxiety:      ESAS Depression:       Cannot obtain due to patient factors     REVIEW OF SYSTEMS:     Positive and pertinent negative findings in ROS are noted above in HPI. The following systems were [x] reviewed / [] unable to be reviewed as noted in HPI  Other findings are noted below. Systems: constitutional, ears/nose/mouth/throat, respiratory, gastrointestinal, genitourinary, musculoskeletal, integumentary, neurologic, psychiatric, endocrine. Positive findings noted below. Modified ESAS Completed by: provider   Fatigue: 10 Drowsiness: 9     Pain: 0         Anorexia: 10 Dyspnea: 0                    PHYSICAL EXAM:     From RN flowsheet:  Wt Readings from Last 3 Encounters:   07/31/21 123 lb 3.8 oz (55.9 kg)   07/26/21 134 lb 0.6 oz (60.8 kg)   06/17/21 148 lb 2.4 oz (67.2 kg)     Blood pressure 105/62, pulse 77, temperature 97.6 °F (36.4 °C), resp. rate 14, height 5' 7\" (1.702 m), weight 123 lb 3.8 oz (55.9 kg), SpO2 98 %. Pain Scale 1: Visual  Pain Intensity 1: 0                 Last bowel movement, if known:     Constitutional:NAD, not waking up for me       HISTORY:     Active Problems:    Lactic acidosis (7/28/2021)      Past Medical History:   Diagnosis Date    Arthritis     Asthma     Benign prostate hyperplasia     Dementia ([facility] [address]     Hypothyroidism     Spinal stenosis of lumbar region with radiculopathy 6/26/2013      Past Surgical History:   Procedure Laterality Date    HX OTHER SURGICAL      cyst removed from back    HX TONSIL AND ADENOIDECTOMY        History reviewed. No pertinent family history. History reviewed, no pertinent family history. Social History     Tobacco Use    Smoking status: Never Smoker    Smokeless tobacco: Never Used   Substance Use Topics    Alcohol use: Yes     Comment: occ.      No Known Allergies   Current Facility-Administered Medications Medication Dose Route Frequency    haloperidol lactate (HALDOL) injection 1 mg  1 mg IntraMUSCular Q6H PRN    famotidine (PF) (PEPCID) 20 mg in 0.9% sodium chloride 10 mL injection  20 mg IntraVENous Q12H    rivastigmine (EXELON) 9.5 mg/24 hour patch 1 Patch  1 Patch TransDERmal DAILY    levothyroxine (SYNTHROID) tablet 50 mcg  50 mcg Oral ACB    dextrose 5% infusion  50 mL/hr IntraVENous CONTINUOUS    sodium chloride (NS) flush 5-40 mL  5-40 mL IntraVENous PRN    acetaminophen (TYLENOL) tablet 650 mg  650 mg Oral Q6H PRN    Or    acetaminophen (TYLENOL) suppository 650 mg  650 mg Rectal Q6H PRN    enoxaparin (LOVENOX) injection 40 mg  40 mg SubCUTAneous DAILY    bisacodyL (DULCOLAX) suppository 10 mg  10 mg Rectal DAILY PRN    finasteride (PROSCAR) tablet 5 mg  5 mg Oral DAILY    sertraline (ZOLOFT) tablet 25 mg  25 mg Oral DAILY    tamsulosin (FLOMAX) capsule 0.4 mg  0.4 mg Oral DAILY          LAB AND IMAGING FINDINGS:     Lab Results   Component Value Date/Time    WBC 8.9 08/06/2021 05:10 AM    HGB 13.1 08/06/2021 05:10 AM    PLATELET 003 50/07/9280 05:10 AM     Lab Results   Component Value Date/Time    Sodium 137 08/06/2021 05:10 AM    Potassium 3.6 08/06/2021 05:10 AM    Chloride 103 08/06/2021 05:10 AM    CO2 29 08/06/2021 05:10 AM    BUN 12 08/06/2021 05:10 AM    Creatinine 0.58 (L) 08/06/2021 05:10 AM    Calcium 8.8 08/06/2021 05:10 AM    Magnesium 1.8 08/05/2021 02:25 AM      Lab Results   Component Value Date/Time    Alk.  phosphatase 89 07/28/2021 02:59 PM    Protein, total 7.8 07/28/2021 02:59 PM    Albumin 4.4 07/28/2021 02:59 PM    Globulin 3.4 07/28/2021 02:59 PM     Lab Results   Component Value Date/Time    INR 1.1 08/04/2021 07:14 PM    Prothrombin time 11.1 08/04/2021 07:14 PM    aPTT 29.0 08/04/2021 07:14 PM    aPTT 25.2 06/14/2013 11:50 AM      No results found for: IRON, FE, TIBC, IBCT, PSAT, FERR   No results found for: PH, PCO2, PO2  No components found for: GLPOC   No results found for: CPK, CKMB             Total time: 70 min   Counseling / coordination time, spent as noted above: 55 min   > 50% counseling / coordination?: yes    Prolonged service was provided for  []30 min   []75 min in face to face time in the presence of the patient, spent as noted above. Time Start:   Time End:   Note: this can only be billed with 05341 (initial) or 01281 (follow up). If multiple start / stop times, list each separately.

## 2021-08-05 NOTE — PROGRESS NOTES
[facility] [facility] Adult  Hospitalist Group                                                                                          Hospitalist Progress Note  Sedrick Bonds MD  Answering service: [phone] OR [phone] from in house phone      NAME:  Susu Flower  :  3/23/1933  MRN:  [id number]      Admission Summary:   The patient is an 45-year-old male with longstanding history of dementia of Alzheimer's type. The patient is in a Memory Unit because of his agitation and psych issues, the patient was being transferred to the Psychiatry unit at [place]. In [place], the facility had wanted a physical and routine labs to be done at a facility before accepting the patient. The patient was evaluated in the ER. The patient upon arrival to the [facility] ER was extremely combative and agitated, received Geodon and some Ativan after which he became less combative and manageable. The patient is unable to give any history in the ER. Further questioning from the family members, it was noted that he is scheduled to undergo some TURP and has had intermittent urinary retention for which he has chronic Orta. The patient has a history of chronic urinary tract infections in the past.  The urine that was obtained was extremely dark colored for which he received a dose of vancomycin. The labs showed significantly elevated lactic acidosis of 4.5 for which IV fluids were administered. The patient's lactic acid came down to 1.5 after that. Currently, he is resting comfortably, responds to the verbal and tactile stimuli. No signs of agitation were noted. No further history can be obtained because of the memory    Interval history / Subjective:   Patient sleepy but not combative.   Following some commands for me this am  He had not received any antipsychotics in 3 days, he has not been combative     Assessment & Plan:     1.   dementia with psychosis    initially was agitated which appears to be due catheter associated UTI  changed abx to meropenem, stable= non-agressive behavior after treatment  2. Lactic acidosis, resolved. 3.  Dementia of Alzheimer's type,   Unable to take PO meds- cont exelon patch-   Has not needed haldol or geodon in 3 days  seroquel was discontinued  4. History of benign prostatic hypertrophy and chronic rodriguez,   continue with the Flomax and Proscar. - instructed nursing- Do not remove rodriguez   Urology consulted- Patient scheduled for TURP on Aug 11th at [facility] w Dr Gabbie Rose  Working on transfer of patient to [facility]- transfer center has been called  Urology refusing to do the procedure here despite multiple attempts to avoid the transfer  5. Hypothyroidism, continue with Synthroid. 6. Dysphagia and malnutrition due to dementia and poor PO intake  - cont D5 fluids to prevent acidosis  7. Hypokalemia- replete    Code status: Full Code  DVT prophylaxis: Lovenox    Care Plan discussed with: Patient/Family  Anticipated Disposition: SNF/LTC  Anticipated Discharge: 24 hours to 48 hours     Hospital Problems  Never Reviewed        Codes Class Noted POA    Lactic acidosis ICD-10-CM: E87.2  ICD-9-CM: 276.2  7/28/2021 Unknown                Review of Systems:   A comprehensive review of systems was negative except for that written in the HPI. Vital Signs:    Last 24hrs VS reviewed since prior progress note.  Most recent are:  Visit Vitals  /74 (BP 1 Location: Right upper arm, BP Patient Position: At rest)   Pulse 88   Temp 97.6 °F (36.4 °C)   Resp 16   Ht 5' 7\" (1.702 m)   Wt 55.9 kg (123 lb 3.8 oz)   SpO2 99%   BMI 19.30 kg/m²     Patient Vitals for the past 24 hrs:   Temp Pulse Resp BP SpO2   08/04/21 2113 97.6 °F (36.4 °C) 88 16 132/74 99 %   08/04/21 1401 97.4 °F (36.3 °C) 85 16 106/64 97 %   08/04/21 0916 97.7 °F (36.5 °C) 82 16 107/65 98 %   08/04/21 0326 98.1 °F (36.7 °C) (!) 101  (!) 150/87 93 %       Intake/Output Summary (Last 24 hours) at 8/4/2021 [address] filed at 8/4/2021 1754  Gross per 24 hour   Intake    Output 1000 ml   Net -1000 ml        Physical Examination:        Constitutional:  sleepy but pleasant today, very weak    ENT:  Oral mucosa dry. Resp:  CTA bilaterally. No wheezing/rhonchi/rales. No accessory muscle use   CV:  Regular rhythm, normal rate, no murmurs, gallops, rubs    GI:  Soft, non distended, non tender. : rodriguez with trace hematuria     Musculoskeletal:  No edema, warm, diffuse muscle wasting    Neurologic:  Moves all extremities - no focal neuro deficits  Psych: demented without agitation, flat affect            Data Review:    Review and/or order of clinical lab test  Review and/or order of tests in the radiology section of CPT  Review and/or order of tests in the medicine section of CPT    7/28/21- XR CHEST PORT     INDICATION: Agitation, rule out infection     COMPARISON: 6/17/2021     FINDINGS: A portable AP radiograph of the chest was obtained at 1602 hours. The  patient is on a cardiac monitor. The lungs are clear. The cardiac and  mediastinal contours and pulmonary vascularity are normal.  The bones and soft  tissues are grossly within normal limits.      IMPRESSION  Normal chest.  Labs:     Recent Labs     08/04/21  0439 08/03/21  0423   WBC 7.4 8.0   HGB 13.0 13.8   HCT 37.5 38.9    249     Recent Labs     08/04/21  0439 08/03/21  0423    138   K 3.2* 3.0*    102   CO2 28 27   BUN 12 12   CREA 0.55* 0.49*   * 147*   CA 9.2 9.2     No results for input(s): ALT, AP, TBIL, TBILI, TP, ALB, GLOB, GGT, AML, LPSE in the last 72 hours. No lab exists for component: SGOT, GPT, AMYP, HLPSE  Recent Labs     08/04/21  1914   INR 1.1   PTP 11.1   APTT 29.0      No results for input(s): FE, TIBC, PSAT, FERR in the last 72 hours. No results found for: FOL, RBCF   No results for input(s): PH, PCO2, PO2 in the last 72 hours. No results for input(s): CPK, CKNDX, TROIQ in the last 72 hours.     No lab exists for component: CPKMB  No results found for: CHOL, CHOLX, CHLST, CHOLV, HDL, HDLP, LDL, LDLC, DLDLP, TGLX, TRIGL, TRIGP, CHHD, CHHDX  No results found for: [facility]  Lab Results   Component Value Date/Time    Color RED 08/04/2021 05:52 PM    Appearance CLOUDY (A) 08/04/2021 05:52 PM    Specific gravity 1.006 08/04/2021 05:52 PM    Specific gravity 1.030 06/17/2021 11:06 PM    pH (UA) 6.0 08/04/2021 05:52 PM    Protein 300 (A) 08/04/2021 05:52 PM    Glucose Negative 08/04/2021 05:52 PM    Ketone TRACE (A) 08/04/2021 05:52 PM    Bilirubin Negative 07/28/2021 08:44 PM    Urobilinogen 0.2 08/04/2021 05:52 PM    Nitrites Positive (A) 08/04/2021 05:52 PM    Leukocyte Esterase MODERATE (A) 08/04/2021 05:52 PM    Epithelial cells MODERATE (A) 08/04/2021 05:52 PM    Bacteria Negative 08/04/2021 05:52 PM    WBC 5-10 08/04/2021 05:52 PM    RBC >100 (H) 08/04/2021 05:52 PM         Medications Reviewed:     Current Facility-Administered Medications   Medication Dose Route Frequency    famotidine (PF) (PEPCID) 20 mg in 0.9% sodium chloride 10 mL injection  20 mg IntraVENous Q12H    ziprasidone (GEODON) 10 mg in sterile water (preservative free) 0.5 mL injection  10 mg IntraMUSCular Q6H PRN    rivastigmine (EXELON) 9.5 mg/24 hour patch 1 Patch  1 Patch TransDERmal DAILY    levothyroxine (SYNTHROID) tablet 50 mcg  50 mcg Oral ACB    haloperidol lactate (HALDOL) injection 2 mg  2 mg IntraMUSCular Q6H PRN    dextrose 5% infusion  50 mL/hr IntraVENous CONTINUOUS    sodium chloride (NS) flush 5-40 mL  5-40 mL IntraVENous PRN    acetaminophen (TYLENOL) tablet 650 mg  650 mg Oral Q6H PRN    Or    acetaminophen (TYLENOL) suppository 650 mg  650 mg Rectal Q6H PRN    enoxaparin (LOVENOX) injection 40 mg  40 mg SubCUTAneous DAILY    bisacodyL (DULCOLAX) suppository 10 mg  10 mg Rectal DAILY PRN    finasteride (PROSCAR) tablet 5 mg  5 mg Oral DAILY    sertraline (ZOLOFT) tablet 25 mg  25 mg Oral DAILY    tamsulosin (FLOMAX) capsule 0.4 mg  0.4 mg Oral DAILY     ______________________________________________________________________  EXPECTED LENGTH OF STAY: - - -  ACTUAL LENGTH OF STAY:          7                 Taya Gardner MD

## 2021-08-05 NOTE — PROGRESS NOTES
Bedside and Verbal shift change report given to [facility] [employer], RN (oncoming nurse) by Chema Farmer RN (offgoing nurse). Report included the following information SBAR, Kardex, MAR and Recent Results.

## 2021-08-05 NOTE — PROGRESS NOTES
Occupational Therapy Note:     Pt familiar to therapist from prior admission. Received OT orders at this time. Pt sleeping and did not rouse to therapist and nurse in room. Pt is not following commands at this time time. He is in FAVIAN soft wrist restraints. Per most recent records dated 6/18/21:  \"Per wife's report, patient was up and moving much better and ambulating without any DME prior to admission. They lived in assisted living and had aides coming in 3-4 days a week to help with ADLs and IADLs. Wife reporting that he is functioning below baseline at this time. \"     No family in room to confirm PLOF or even help obtain cognitive status. Per record, pt is supposed to transfer to [facility] as soon as possible for scheduled TURP on 8/11/21. Per notes, pt cannot have procedure here. Will follow for evaluation as able and appropriate. Pt may also benefit from PT consult pending ability to participate with therapy intervention.      Gill Stephens, OT

## 2021-08-05 NOTE — CONSULTS
Palliative Medicine    Consult received, chart reviewed and touched base w/ son Truman Hoyos. Pt has declined significantly in past 1.5 months and family thinks it would be helpful to talk about what we are treating now, but also \"what ifs\" in the future given his dementia. Plan on family meeting tmrw, 8/6- family to get back to me w/ time. In the meantime, goals are to tx to [place] for the procedure as seem it cannot be done at [facility] [place].

## 2021-08-06 LAB
ANION GAP SERPL CALC-SCNC: 5 MMOL/L (ref 5–15)
BUN SERPL-MCNC: 12 MG/DL (ref 6–20)
BUN/CREAT SERPL: 21 (ref 12–20)
CALCIUM SERPL-MCNC: 8.8 MG/DL (ref 8.5–10.1)
CHLORIDE SERPL-SCNC: 103 MMOL/L (ref 97–108)
CO2 SERPL-SCNC: 29 MMOL/L (ref 21–32)
CREAT SERPL-MCNC: 0.58 MG/DL (ref 0.7–1.3)
ERYTHROCYTE [DISTWIDTH] IN BLOOD BY AUTOMATED COUNT: 13.2 % (ref 11.5–14.5)
GLUCOSE SERPL-MCNC: 121 MG/DL (ref 65–100)
HCT VFR BLD AUTO: 38.1 % (ref 36.6–50.3)
HGB BLD-MCNC: 13.1 G/DL (ref 12.1–17)
MCH RBC QN AUTO: 30.5 PG (ref 26–34)
MCHC RBC AUTO-ENTMCNC: 34.4 G/DL (ref 30–36.5)
MCV RBC AUTO: 88.6 FL (ref 80–99)
NRBC # BLD: 0 K/UL (ref 0–0.01)
NRBC BLD-RTO: 0 PER 100 WBC
PLATELET # BLD AUTO: 202 K/UL (ref 150–400)
PMV BLD AUTO: 10.1 FL (ref 8.9–12.9)
POTASSIUM SERPL-SCNC: 3.6 MMOL/L (ref 3.5–5.1)
RBC # BLD AUTO: 4.3 M/UL (ref 4.1–5.7)
SODIUM SERPL-SCNC: 137 MMOL/L (ref 136–145)
WBC # BLD AUTO: 8.9 K/UL (ref 4.1–11.1)

## 2021-08-06 PROCEDURE — 80048 BASIC METABOLIC PNL TOTAL CA: CPT

## 2021-08-06 PROCEDURE — 74011250636 HC RX REV CODE- 250/636: Performed by: INTERNAL MEDICINE

## 2021-08-06 PROCEDURE — 85027 COMPLETE CBC AUTOMATED: CPT

## 2021-08-06 PROCEDURE — 74011000250 HC RX REV CODE- 250: Performed by: INTERNAL MEDICINE

## 2021-08-06 PROCEDURE — 36415 COLL VENOUS BLD VENIPUNCTURE: CPT

## 2021-08-06 PROCEDURE — 65270000029 HC RM PRIVATE

## 2021-08-06 PROCEDURE — 99223 1ST HOSP IP/OBS HIGH 75: CPT | Performed by: PHYSICAL MEDICINE & REHABILITATION

## 2021-08-06 RX ADMIN — FAMOTIDINE 20 MG: 10 INJECTION, SOLUTION INTRAVENOUS at 09:31

## 2021-08-06 RX ADMIN — POTASSIUM CHLORIDE 10 MEQ: 7.46 INJECTION, SOLUTION INTRAVENOUS at 00:32

## 2021-08-06 RX ADMIN — FAMOTIDINE 20 MG: 10 INJECTION, SOLUTION INTRAVENOUS at 22:06

## 2021-08-06 RX ADMIN — ENOXAPARIN SODIUM 40 MG: 40 INJECTION SUBCUTANEOUS at 09:32

## 2021-08-06 NOTE — PROGRESS NOTES
Discussed with Medical team  Will attempt to move pts surgery to [facility] next week  Proceed if he is medically stable  Recent urine cx neg  Dr. Wen Friend out this week  He will discuss with family Monday

## 2021-08-06 NOTE — PROGRESS NOTES
Bedside and Verbal shift change report given to [facility] [employer], RN (oncoming nurse) by Ronaldo Saldivar RN (offgoing nurse). Report included the following information SBAR, Kardex, Procedure Summary, Intake/Output, MAR, Accordion and Recent Results.

## 2021-08-06 NOTE — PROGRESS NOTES
[facility] [facility] Adult  Hospitalist Group                                                                                          Hospitalist Progress Note  Reji Koehler MD  Answering service: [phone] from in house phone      NAME:  Jessica Bronson  :  3/23/1933  MRN:  [id number]      Admission Summary: \"The patient is an 59-year-old male with longstanding history of dementia of Alzheimer's type. The patient is in a Memory Unit because of his agitation and psych issues, the patient was being transferred to the Psychiatry unit at [place]. In [place], the facility had wanted a physical and routine labs to be done at a facility before accepting the patient. The patient was evaluated in the ER. The patient upon arrival to the [facility] ER was extremely combative and agitated, received Geodon and some Ativan after which he became less combative and manageable. The patient is unable to give any history in the ER. Further questioning from the family members, it was noted that he is scheduled to undergo some TURP and has had intermittent urinary retention for which he has chronic Orta. The patient has a history of chronic urinary tract infections in the past.  The urine that was obtained was extremely dark colored for which he received a dose of vancomycin. The labs showed significantly elevated lactic acidosis of 4.5 for which IV fluids were administered. The patient's lactic acid came down to 1.5 after that. Currently, he is resting comfortably, responds to the verbal and tactile stimuli. No signs of agitation were noted.   No further history can be obtained because of the memory\"    Interval history / Subjective:   Pt is awake and confused     Assessment & Plan:     Per prior hospitalist note:   \"1.   dementia with psychosis    initially was agitated which appears to be due catheter associated UTI  changed abx to meropenem, stable= non-agressive behavior after treatment  2. Lactic acidosis, resolved. 3.  Dementia of Alzheimer's type,   Unable to take PO meds- cont exelon patch-   Has not needed haldol or geodon in 3 days  seroquel was discontinued  4. History of benign prostatic hypertrophy and chronic rodriguez,   continue with the Flomax and Proscar. - instructed nursing- Do not remove rodriguez   Urology consulted- Patient scheduled for TURP on Aug 11th at [facility] w Dr Cuellar Person  Working on transfer of patient to [facility]- transfer center has been called  Urology refusing to do the procedure here despite multiple attempts to avoid the transfer\"  ------------------------------------------------  Excerpt of addendum note by prior hospitalist Dr. Carri Marie  8/4/2021:  \"Reviewed note from urology-   due to chronic urinary retention we will not be attempting a voiding trial here prior to transfer of patient to [place] for his surgery next week  I was called by [facility] office this afternoon and they confirmed his scheduled surgery next week on 8/11/21  The gave me a list of preop labs/workup that needed to be completed since patient did not [facility]op [facility]  That list included CBC, BMP, UA with urine culture, EKG, CXR, PT/PTT, type and screen  We have already done all of this except the coags and type and screen- will order coags, repeat the UA and urine culture, type and screen with have to be done over at [facility]. Family will  patient and take [facility] to [facility] for his procedure early next week  I told the son that we would print out the above items and give it to him on discharge for the admission at [facility]\"  ---------------------------------------    Excerpt of note by ESPERANZA 8/3/2021:  \"ESPERANZA spoke with MD. Plan will be for patient to be transferred to [facility], possibly as soon as today. Accepting MD is Dr. Kyle Sanchez. ESPERANZA completed CM portion of [place].  Once bed is confirmed, nursing will need to finish Katharina and and [facility] phone# [phone] ambulance transport time will need to be secured. CM placed [facility] on will call, and updated nursing. \"  ---->8/5 case management (CM) reports that pt cannot be transferred directly to [place], related to bed availability    --------------------------------------------    5. Hypothyroidism, on thyroid replacement  6. Dysphagia and malnutrition due to dementia and poor PO intake  -continue dextrose IV bag, 8/5 ordered palliative care consult to assist in goals of care regarding how aggressive to approach (eg dobhoff/peg tube/etc, current code status is full code). 7. Hypokalemia- replete as needed with intermittent labwork    8/6/2021 poor po intake, pt will take small bites but has to be feed by someone slowly. Spoke with one of the son's who identifies himself that he and the other brother is the medical POA as the spouse (their mother) is in a facility. They wish to have NG tube and trial of feeding via NG tube---ordered ng tube insertion and nutrition consult to start NG feeding and notified patient's nurse of plan. ---->>spoke with urologist Dr. Bernard Be who plans to have Dr. Wen Friend contact patient's family on Monday and attempt to reschedule patient at [facility].      Code status: Full Code  DVT prophylaxis: Lovenox    Care Plan discussed with: patient/nurse/CM/urologist  Anticipated Disposition: tbd  Anticipated Discharge: tbd     Hospital Problems  Never Reviewed        Codes Class Noted POA    Lactic acidosis ICD-10-CM: E87.2  ICD-9-CM: 276.2  7/28/2021 Unknown                Review of Systems:   Unable to obtain due to current state    Vital Signs:    Last 24hrs VS reviewed since prior progress note.  Most recent are:  Visit Vitals  /62 (BP 1 Location: Right upper arm, BP Patient Position: At rest)   Pulse 77   Temp 97.6 °F (36.4 °C)   Resp 14   Ht 5' 7\" (1.702 m)   Wt 55.9 kg (123 lb 3.8 oz)   SpO2 98%   BMI 19.30 kg/m² Patient Vitals for the past 24 hrs:   Temp Pulse Resp BP SpO2   08/06/21 0500 97.6 °F (36.4 °C) 77 14 105/62 98 %   08/05/21 2227 97.5 °F (36.4 °C) 82 14 130/76 98 %   08/05/21 1445 98.3 °F (36.8 °C) 79 12 107/66 96 %     No intake or output data in the 24 hours ending 08/06/21 4644     Physical Examination:        Constitutional:  nad, confused   ENT:  anicteric, supple    Resp:  No accessory muscle use, limited exam as pt does not follow commands to take in deep breaths, no obvious wheezing or rales   CV:  Regular rhythm, normal rate, no rubs    GI:  soft, no grimacing with palpation, no high pitch sounds, bowel sound heard  : chronic rodriguez    Musculoskeletal:  generalized muscle atrophy, no cyanosis, pulses present, moves all extremities    Neurologic:  Moves all extremities, only oriented to self (name),   Psych: baseline dementia, not agitated            Data Review:     Labs:     Recent Labs     08/06/21  0510 08/05/21  0225   WBC 8.9 7.6   HGB 13.1 12.7   HCT 38.1 36.6    216     Recent Labs     08/06/21  0510 08/05/21  0225 08/04/21  0439    139 136   K 3.6 3.2* 3.2*    103 102   CO2 29 29 28   BUN 12 11 12   CREA 0.58* 0.60* 0.55*   * 136* 146*   CA 8.8 8.9 9.2   MG  --  1.8  --        Recent Labs     08/04/21  1914   INR 1.1   PTP 11.1   APTT 29.0          Medications Reviewed:     ______________________________________________________________________  EXPECTED LENGTH OF STAY: - - -  ACTUAL LENGTH OF STAY:          Yariel Cornelius MD

## 2021-08-06 NOTE — PROGRESS NOTES
Occupational Therapy    Chart reviewed in prep for skilled OT evaluation; however, RN requests hold as pt not appropriate for evaluation at this time, with pt in [address] restraints, with no direction following and pt refusing to eat and drink. Will hold evaluation at this time and follow up later as able and appropriate.     Thank you,  Lissette Dias, OT

## 2021-08-07 LAB
ANION GAP SERPL CALC-SCNC: 7 MMOL/L (ref 5–15)
BASOPHILS # BLD: 0.1 K/UL (ref 0–0.1)
BASOPHILS NFR BLD: 1 % (ref 0–1)
BUN SERPL-MCNC: 15 MG/DL (ref 6–20)
BUN/CREAT SERPL: 24 (ref 12–20)
CALCIUM SERPL-MCNC: 8.9 MG/DL (ref 8.5–10.1)
CHLORIDE SERPL-SCNC: 102 MMOL/L (ref 97–108)
CO2 SERPL-SCNC: 31 MMOL/L (ref 21–32)
CREAT SERPL-MCNC: 0.63 MG/DL (ref 0.7–1.3)
DIFFERENTIAL METHOD BLD: ABNORMAL
EOSINOPHIL # BLD: 0.5 K/UL (ref 0–0.4)
EOSINOPHIL NFR BLD: 7 % (ref 0–7)
ERYTHROCYTE [DISTWIDTH] IN BLOOD BY AUTOMATED COUNT: 13.4 % (ref 11.5–14.5)
GLUCOSE SERPL-MCNC: 129 MG/DL (ref 65–100)
HCT VFR BLD AUTO: 37.6 % (ref 36.6–50.3)
HGB BLD-MCNC: 13.2 G/DL (ref 12.1–17)
IMM GRANULOCYTES # BLD AUTO: 0 K/UL (ref 0–0.04)
IMM GRANULOCYTES NFR BLD AUTO: 0 % (ref 0–0.5)
LYMPHOCYTES # BLD: 1.7 K/UL (ref 0.8–3.5)
LYMPHOCYTES NFR BLD: 21 % (ref 12–49)
MAGNESIUM SERPL-MCNC: 1.9 MG/DL (ref 1.6–2.4)
MCH RBC QN AUTO: 30.6 PG (ref 26–34)
MCHC RBC AUTO-ENTMCNC: 35.1 G/DL (ref 30–36.5)
MCV RBC AUTO: 87 FL (ref 80–99)
MONOCYTES # BLD: 0.3 K/UL (ref 0–1)
MONOCYTES NFR BLD: 4 % (ref 5–13)
NEUTS SEG # BLD: 5.3 K/UL (ref 1.8–8)
NEUTS SEG NFR BLD: 67 % (ref 32–75)
NRBC # BLD: 0 K/UL (ref 0–0.01)
NRBC BLD-RTO: 0 PER 100 WBC
PLATELET # BLD AUTO: 210 K/UL (ref 150–400)
PMV BLD AUTO: 9.6 FL (ref 8.9–12.9)
POTASSIUM SERPL-SCNC: 3.6 MMOL/L (ref 3.5–5.1)
RBC # BLD AUTO: 4.32 M/UL (ref 4.1–5.7)
SODIUM SERPL-SCNC: 140 MMOL/L (ref 136–145)
WBC # BLD AUTO: 7.9 K/UL (ref 4.1–11.1)

## 2021-08-07 PROCEDURE — 85025 COMPLETE CBC W/AUTO DIFF WBC: CPT

## 2021-08-07 PROCEDURE — 74011250637 HC RX REV CODE- 250/637: Performed by: FAMILY MEDICINE

## 2021-08-07 PROCEDURE — 65270000029 HC RM PRIVATE

## 2021-08-07 PROCEDURE — 74011000250 HC RX REV CODE- 250: Performed by: INTERNAL MEDICINE

## 2021-08-07 PROCEDURE — 83735 ASSAY OF MAGNESIUM: CPT

## 2021-08-07 PROCEDURE — 36415 COLL VENOUS BLD VENIPUNCTURE: CPT

## 2021-08-07 PROCEDURE — 74011250636 HC RX REV CODE- 250/636: Performed by: INTERNAL MEDICINE

## 2021-08-07 PROCEDURE — 74011250636 HC RX REV CODE- 250/636: Performed by: FAMILY MEDICINE

## 2021-08-07 PROCEDURE — 80048 BASIC METABOLIC PNL TOTAL CA: CPT

## 2021-08-07 PROCEDURE — 51798 US URINE CAPACITY MEASURE: CPT

## 2021-08-07 RX ADMIN — DEXTROSE MONOHYDRATE 50 ML/HR: 50 INJECTION, SOLUTION INTRAVENOUS at 09:11

## 2021-08-07 RX ADMIN — FAMOTIDINE 20 MG: 10 INJECTION, SOLUTION INTRAVENOUS at 21:47

## 2021-08-07 RX ADMIN — FAMOTIDINE 20 MG: 10 INJECTION, SOLUTION INTRAVENOUS at 16:57

## 2021-08-07 RX ADMIN — ENOXAPARIN SODIUM 40 MG: 40 INJECTION SUBCUTANEOUS at 09:09

## 2021-08-07 RX ADMIN — LEVOTHYROXINE SODIUM 50 MCG: 0.05 TABLET ORAL at 07:00

## 2021-08-07 NOTE — PROGRESS NOTES
Bedside and Verbal shift change report given to [facility] [employer], RN (oncoming nurse) by Destiny Cantrell RN (offgoing nurse). Report included the following information SBAR.

## 2021-08-07 NOTE — PROGRESS NOTES
[facility] [facility] Adult  Hospitalist Group                                                                                          Hospitalist Progress Note  Dewayne Chew MD  Answering service: [phone] OR [phone] from in house phone      NAME:  Deann Whitfield  :  3/23/1933  MRN:  [id number]      Admission Summary: \"The patient is an 44-year-old male with longstanding history of dementia of Alzheimer's type. The patient is in a Memory Unit because of his agitation and psych issues, the patient was being transferred to the Psychiatry unit at [facility]. In [facility], the facility had wanted a physical and routine labs to be done at a facility before accepting the patient. The patient was evaluated in the ER. The patient upon arrival to the [facility] ER was extremely combative and agitated, received Geodon and some Ativan after which he became less combative and manageable. The patient is unable to give any history in the ER. Further questioning from the family members, it was noted that he is scheduled to undergo some TURP and has had intermittent urinary retention for which he has chronic Orta. The patient has a history of chronic urinary tract infections in the past.  The urine that was obtained was extremely dark colored for which he received a dose of vancomycin. The labs showed significantly elevated lactic acidosis of 4.5 for which IV fluids were administered. The patient's lactic acid came down to 1.5 after that. Currently, he is resting comfortably, responds to the verbal and tactile stimuli. No signs of agitation were noted.   No further history can be obtained because of the memory\"    Interval history / Subjective:   F/u for dementia with behavioral disturbance  Patient is a poor historian     Assessment & Plan:        \"1.   dementia with behavioral disturbance    initially was agitated which appears to be due catheter associated UTI  changed abx to meropenem, stable= non-agressive behavior after treatment  - resolved    2. Lactic acidosis, resolved. 3.  Dementia of Alzheimer's type,   Unable to take PO meds- cont exelon patch-   On haldol I.M every 6 hrs as needed    4. History of benign prostatic hypertrophy and chronic rodriguez,   continue with the Flomax and Proscar. - instructed nursing- Do not remove rodriguez   Urology consulted- Patient scheduled for TURP on Aug 11th at [facility] Dr Gabbie Rose  Working on transfer of patient to [facility]- transfer center has been called  Urology refusing to do the procedure here despite multiple attempts to avoid the transfer    5. Hypothyroidism, on thyroid replacement  6. Dysphagia and malnutrition due to dementia and poor PO intake  -continue dextrose IV bag, 8/5 ordered palliative care consult to assist in goals of care regarding how aggressive to approach (eg dobhoff/peg tube/etc, current code status is full code). 7. Hypokalemia- replete as needed with intermittent labwork      Code status: Full Code  DVT prophylaxis: Lovenox    Care Plan discussed with: patient/nurse/CM/urologist  Anticipated Disposition: tbd  Anticipated Discharge: tbd     Hospital Problems  Never Reviewed        Codes Class Noted POA    Lactic acidosis ICD-10-CM: E87.2  ICD-9-CM: 276.2  7/28/2021 Unknown                Review of Systems:   Unable to obtain due to current state    Vital Signs:    Last 24hrs VS reviewed since prior progress note.  Most recent are:  Visit Vitals  /71   Pulse 94   Temp 97.2 °F (36.2 °C)   Resp 20   Ht 5' 7\" (1.702 m)   Wt 55.9 kg (123 lb 3.8 oz)   SpO2 98%   BMI 19.30 kg/m²     Patient Vitals for the past 24 hrs:   Temp Pulse Resp BP SpO2   08/07/21 0222 97.2 °F (36.2 °C) 94 20 116/71 98 %   08/06/21 2110 97.5 °F (36.4 °C) 71 18 124/85 95 %   08/06/21 1445 98.3 °F (36.8 °C) 78 14 109/65 97 %       Intake/Output Summary (Last 24 hours) at 8/7/2021 1422  Last data filed at 8/7/2021 1227  Gross per 24 hour   Intake  Output 650 ml   Net -650 ml        Physical Examination:        Constitutional:  nad, confused   ENT:  anicteric, supple    Resp:  No accessory muscle use, limited exam as pt does not follow commands to take in deep breaths, no obvious wheezing or rales   CV:  Regular rhythm, normal rate, no rubs    GI:  soft, no grimacing with palpation, no high pitch sounds, bowel sound heard  : chronic rodriguez    Musculoskeletal:  generalized muscle atrophy, no cyanosis, pulses present, moves all extremities    Neurologic:  Moves all extremities, only oriented to self (name),   Psych: baseline dementia, not agitated            Data Review:     Labs:     Recent Labs     08/07/21 0327 08/06/21  0510   WBC 7.9 8.9   HGB 13.2 13.1   HCT 37.6 38.1    202     Recent Labs     08/07/21  0327 08/06/21  0510 08/05/21  0225    137 139   K 3.6 3.6 3.2*    103 103   CO2 31 29 29   BUN 15 12 11   CREA 0.63* 0.58* 0.60*   * 121* 136*   CA 8.9 8.8 8.9   MG 1.9  --  1.8       Recent Labs     08/04/21  1914   INR 1.1   PTP 11.1   APTT 29.0          Medications Reviewed:     ______________________________________________________________________  EXPECTED LENGTH OF STAY: - - -  ACTUAL LENGTH OF STAY:          [facility] Devika Zuleta MD

## 2021-08-08 LAB — TSH SERPL DL<=0.05 MIU/L-ACNC: 7.17 UIU/ML (ref 0.36–3.74)

## 2021-08-08 PROCEDURE — 74011000250 HC RX REV CODE- 250: Performed by: INTERNAL MEDICINE

## 2021-08-08 PROCEDURE — 74011250636 HC RX REV CODE- 250/636: Performed by: INTERNAL MEDICINE

## 2021-08-08 PROCEDURE — 74011250637 HC RX REV CODE- 250/637: Performed by: FAMILY MEDICINE

## 2021-08-08 PROCEDURE — 84443 ASSAY THYROID STIM HORMONE: CPT

## 2021-08-08 PROCEDURE — 65270000029 HC RM PRIVATE

## 2021-08-08 RX ORDER — POTASSIUM CHLORIDE 750 MG/1
40 TABLET, FILM COATED, EXTENDED RELEASE ORAL ONCE
Status: ACTIVE | OUTPATIENT
Start: 2021-08-08 | End: 2021-08-08

## 2021-08-08 RX ADMIN — LEVOTHYROXINE SODIUM 50 MCG: 0.05 TABLET ORAL at 06:31

## 2021-08-08 RX ADMIN — FAMOTIDINE 20 MG: 10 INJECTION, SOLUTION INTRAVENOUS at 21:56

## 2021-08-08 NOTE — PROGRESS NOTES
[address] [facility] Adult  Hospitalist Group                                                                                          Hospitalist Progress Note  Scotty Manning MD  Answering service: [phone] from in house phone      NAME:  Barbra Logan  :  3/23/1933  MRN:  [id number]      Admission Summary: \"The patient is an 42-year-old male with longstanding history of dementia of Alzheimer's type. The patient is in a Memory Unit because of his agitation and psych issues, the patient was being transferred to the Psychiatry unit at [place]. In [place], the facility had wanted a physical and routine labs to be done at a facility before accepting the patient. The patient was evaluated in the ER. The patient upon arrival to the [facility] ER was extremely combative and agitated, received Geodon and some Ativan after which he became less combative and manageable. The patient is unable to give any history in the ER. Further questioning from the family members, it was noted that he is scheduled to undergo some TURP and has had intermittent urinary retention for which he has chronic Orta. The patient has a history of chronic urinary tract infections in the past.  The urine that was obtained was extremely dark colored for which he received a dose of vancomycin. The labs showed significantly elevated lactic acidosis of 4.5 for which IV fluids were administered. The patient's lactic acid came down to 1.5 after that. Currently, he is resting comfortably, responds to the verbal and tactile stimuli. No signs of agitation were noted.   No further history can be obtained because of the memory\"    Interval history / Subjective:   F/u for dementia with behavioral disturbance  Somnolent but arousable, A&Ox1  NAD     Assessment & Plan:     dementia with behavioral disturbance likely delirium d/t UTI, improving  Chronic catheter associated UTI  S/p meropenem    Lactic acidosis, resolved. Dementia of Alzheimer's type  Unable to take PO meds- cont exelon patch-   haldol IM every 6 hrs as needed    History of benign prostatic hypertrophy and chronic rodriguez  continue with the Flomax and Proscar  Do not remove rodriguez   Urology consulted- Patient scheduled for TURP on Aug 11th at [facility] Dr Dorothy Pate  Will attempt to move pts surgery to [facility] this week  Recent urine cx neg  Dr. Dorothy Pate out this week, He will discuss with family Monday    Hypothyroidism, on thyroid replacement  Check TSH    Dysphagia and malnutrition due to dementia and poor PO intake  continue dextrose IV bag  palliative care following, NGT trial    Hypokalemia, mild, replete  Mag ok    Code status: Full Code  DVT prophylaxis: Lovenox    Care Plan discussed with: patient  Anticipated Disposition: tbd  Anticipated Discharge: tbd     Hospital Problems  Never Reviewed        Codes Class Noted POA    Lactic acidosis ICD-10-CM: E87.2  ICD-9-CM: 276.2  7/28/2021 Unknown                Review of Systems:   Unable to obtain due to current state    Vital Signs:    Last 24hrs VS reviewed since prior progress note.  Most recent are:  Visit Vitals  /66   Pulse 74   Temp 97.3 °F (36.3 °C)   Resp 16   Ht 5' 7\" (1.702 m)   Wt 55.9 kg (123 lb 3.8 oz)   SpO2 94%   BMI 19.30 kg/m²     Patient Vitals for the past 24 hrs:   Temp Pulse Resp BP SpO2   08/08/21 0600  74 16 120/66    08/08/21 0400  65 18 (!) 157/60    08/08/21 0351 97.3 °F (36.3 °C)       08/08/21 0200  64 18 106/66 94 %   08/08/21 0000  66 16 113/65 94 %   08/07/21 2200  74 16 116/70 95 %   08/07/21 2042 97.8 °F (36.6 °C) 70 18 (!) 113/58 96 %   08/07/21 2000  65 16 106/60 (!) 78 %   08/07/21 1501 97.7 °F (36.5 °C) 75 17 100/63 93 %       Intake/Output Summary (Last 24 hours) at 8/8/2021 1035  Last data filed at 8/7/2021 2207  Gross per 24 hour   Intake    Output 1050 ml   Net -1050 ml        Physical Examination:        Constitutional:  nad   ENT: anicteric, supple    Resp:  No accessory muscle use, no obvious wheezing or rales   CV:  Regular rhythm, normal rate    GI:  soft, no grimacing with palpation  : chronic rodriguez    Musculoskeletal:  generalized muscle atrophy, no cyanosis, MUNOZ    Neurologic:  Moves all extremities, only oriented to self (name)  Psych: baseline dementia, not agitated            Data Review:     Labs:     Recent Labs     08/07/21  0327 08/06/21  0510   WBC 7.9 8.9   HGB 13.2 13.1   HCT 37.6 38.1    202     Recent Labs     08/07/21  0327 08/06/21  0510    137   K 3.6 3.6    103   CO2 31 29   BUN 15 12   CREA 0.63* 0.58*   * 121*   CA 8.9 8.8   MG 1.9  --        No results for input(s): INR, PTP, APTT, INREXT, INREXT in the last 72 hours.        Medications Reviewed:     ______________________________________________________________________  EXPECTED LENGTH OF STAY: - - -  ACTUAL LENGTH OF STAY:          Lou Valle MD

## 2021-08-08 NOTE — PROGRESS NOTES
Bedside and Verbal shift change report given to [facility], RN (oncoming nurse) by Nichelle Davis RN (offgoing nurse). Report included the following information SBAR.

## 2021-08-09 LAB
ANION GAP SERPL CALC-SCNC: 5 MMOL/L (ref 5–15)
BUN SERPL-MCNC: 16 MG/DL (ref 6–20)
BUN/CREAT SERPL: 27 (ref 12–20)
CALCIUM SERPL-MCNC: 8.7 MG/DL (ref 8.5–10.1)
CHLORIDE SERPL-SCNC: 103 MMOL/L (ref 97–108)
CO2 SERPL-SCNC: 30 MMOL/L (ref 21–32)
CREAT SERPL-MCNC: 0.59 MG/DL (ref 0.7–1.3)
GLUCOSE SERPL-MCNC: 121 MG/DL (ref 65–100)
POTASSIUM SERPL-SCNC: 3.2 MMOL/L (ref 3.5–5.1)
SODIUM SERPL-SCNC: 138 MMOL/L (ref 136–145)

## 2021-08-09 PROCEDURE — 97535 SELF CARE MNGMENT TRAINING: CPT

## 2021-08-09 PROCEDURE — 99232 SBSQ HOSP IP/OBS MODERATE 35: CPT | Performed by: PHYSICAL MEDICINE & REHABILITATION

## 2021-08-09 PROCEDURE — 74011250636 HC RX REV CODE- 250/636: Performed by: PHYSICAL MEDICINE & REHABILITATION

## 2021-08-09 PROCEDURE — 74011250637 HC RX REV CODE- 250/637: Performed by: FAMILY MEDICINE

## 2021-08-09 PROCEDURE — 74011250636 HC RX REV CODE- 250/636: Performed by: INTERNAL MEDICINE

## 2021-08-09 PROCEDURE — 65270000029 HC RM PRIVATE

## 2021-08-09 PROCEDURE — 97165 OT EVAL LOW COMPLEX 30 MIN: CPT

## 2021-08-09 PROCEDURE — 74011000250 HC RX REV CODE- 250: Performed by: INTERNAL MEDICINE

## 2021-08-09 PROCEDURE — 80048 BASIC METABOLIC PNL TOTAL CA: CPT

## 2021-08-09 PROCEDURE — 36415 COLL VENOUS BLD VENIPUNCTURE: CPT

## 2021-08-09 PROCEDURE — 74011250637 HC RX REV CODE- 250/637: Performed by: INTERNAL MEDICINE

## 2021-08-09 PROCEDURE — 74011250636 HC RX REV CODE- 250/636: Performed by: FAMILY MEDICINE

## 2021-08-09 RX ORDER — LEVOTHYROXINE SODIUM 125 UG/1
62.5 TABLET ORAL
Status: DISCONTINUED | OUTPATIENT
Start: 2021-08-10 | End: 2021-08-13 | Stop reason: HOSPADM

## 2021-08-09 RX ORDER — CEFAZOLIN SODIUM/WATER 2 G/20 ML
2 SYRINGE (ML) INTRAVENOUS
Status: DISCONTINUED | OUTPATIENT
Start: 2021-08-11 | End: 2021-08-09 | Stop reason: SDUPTHER

## 2021-08-09 RX ORDER — POTASSIUM CHLORIDE 7.45 MG/ML
10 INJECTION INTRAVENOUS
Status: COMPLETED | OUTPATIENT
Start: 2021-08-09 | End: 2021-08-10

## 2021-08-09 RX ORDER — HALOPERIDOL 5 MG/ML
1 INJECTION INTRAMUSCULAR
Status: DISCONTINUED | OUTPATIENT
Start: 2021-08-09 | End: 2021-08-13 | Stop reason: HOSPADM

## 2021-08-09 RX ADMIN — LEVOTHYROXINE SODIUM 50 MCG: 0.05 TABLET ORAL at 07:05

## 2021-08-09 RX ADMIN — POTASSIUM CHLORIDE 10 MEQ: 7.46 INJECTION, SOLUTION INTRAVENOUS at 20:21

## 2021-08-09 RX ADMIN — SERTRALINE 25 MG: 50 TABLET, FILM COATED ORAL at 13:59

## 2021-08-09 RX ADMIN — POTASSIUM CHLORIDE 10 MEQ: 7.46 INJECTION, SOLUTION INTRAVENOUS at 19:19

## 2021-08-09 RX ADMIN — FAMOTIDINE 20 MG: 10 INJECTION, SOLUTION INTRAVENOUS at 10:58

## 2021-08-09 RX ADMIN — FINASTERIDE 5 MG: 5 TABLET, FILM COATED ORAL at 13:59

## 2021-08-09 RX ADMIN — POTASSIUM CHLORIDE 10 MEQ: 7.46 INJECTION, SOLUTION INTRAVENOUS at 17:09

## 2021-08-09 RX ADMIN — POTASSIUM CHLORIDE 10 MEQ: 7.46 INJECTION, SOLUTION INTRAVENOUS at 23:00

## 2021-08-09 RX ADMIN — ENOXAPARIN SODIUM 40 MG: 40 INJECTION SUBCUTANEOUS at 10:58

## 2021-08-09 RX ADMIN — TAMSULOSIN HYDROCHLORIDE 0.4 MG: 0.4 CAPSULE ORAL at 13:59

## 2021-08-09 RX ADMIN — POTASSIUM CHLORIDE 10 MEQ: 7.46 INJECTION, SOLUTION INTRAVENOUS at 22:07

## 2021-08-09 RX ADMIN — FAMOTIDINE 20 MG: 10 INJECTION, SOLUTION INTRAVENOUS at 21:03

## 2021-08-09 RX ADMIN — HALOPERIDOL LACTATE 1 MG: 5 INJECTION, SOLUTION INTRAMUSCULAR at 14:04

## 2021-08-09 RX ADMIN — HALOPERIDOL LACTATE 1 MG: 5 INJECTION, SOLUTION INTRAMUSCULAR at 21:03

## 2021-08-09 RX ADMIN — DEXTROSE MONOHYDRATE 50 ML/HR: 50 INJECTION, SOLUTION INTRAVENOUS at 14:08

## 2021-08-09 NOTE — PROGRESS NOTES
Palliative Medicine Consult  Tono: [phone]    Patient Name: Fernando Obrien  YOB: 1933    Date of Initial Consult: 8/6/21  Reason for Consult: care decisions   Requesting Provider: Marissa Solorzano   Primary Care Physician: Kylee, MD Anna     SUMMARY:   Fernando Obrien \"Pops\" is a 80 y.o. with a past history of hypothyroidism, depression and dementia that started approximately 5 years ago, chronic rodriguez catheter for BPH (scheduled for TURP on 8/11/21 w/ Dr Wen Friend at [facility], had TURP in 2014) who was admitted on 7/28/2021 from [facility] with aggressive behavior. Was seen at [facility] neuro clinic 3/2021 for cognitive changes that started 5 years ago, cannot do complex activities and requires around the clock care, incontinent w/ urine. Most recently at [facility] and was going to be tx to a psychiatric unit in [place] but required medical evaluation first. In ED was very agitated and required Ativan , Benadryl and Geodon. Had some improvement w/ time, medication management and treatment for UTI (Meropenem). Per family over past month pt has been in ED or hospital 6 times, declining, mult UTIs. Current medical issues leading to Palliative Medicine involvement include: care decisions. Social: Pt  to Ibis Kemp for 65 years. Had been living together in [place] until moved to [place] [facility] and since then has not been able to see her regularly as he is in the memory center. He worked at [employer] for 30+ years, loved math puzzles. Liked to watch sports, also enjoyed to robles sometimes after going to [place]. 2 children- Royer Bacon and Yuly Opal. Sons are mPOA but they do involve their mother/pt's wife in decisions. PALLIATIVE DIAGNOSES:   1. Severe dementia- was FAST 6D 3/2021  2. Delirium- UTI, malnutrition  3. Feeding difficulties   4. Goals of care        PLAN:   1.  Noted that TURP now scheduled at [facility] on 8/11/21.   2. Family hopeful that this will decr UTIs and delirium associated w/ it, but also realistic that pt's dementia has progressed and we may need to talk about hospice soon. 3. Spoke w/ son Kyara Contreras earlier briefly- left message for him to call me back about code status. Pt ate w/ son Latha Avendano on Saturday but yesterday and today not taking in po. Did not tolerate NGT. 4. Communicated plan of care with: Palliative IDT, [facility] Team incl Emanuel Pearson RN, nery at bedside, and Dr Dianne Cosme / TREATMENT PREFERENCES:     GOALS OF CARE:  Patient/Health Care Proxy Stated Goals: Other (comment) (To see if pt's mental status can improve)    TREATMENT PREFERENCES:   Code Status: Full Code    Advance Care Planning:  [x] [facility] Interdisciplinary Team has updated the ACP Navigator with Health Care Decision Maker and Patient Capacity      Primary Decision Maker: Julius Novoa - [phone]    Primary Decision Maker: Sergio Dawson  Tomas - [phone]  Advance Care Planning 7/30/2021   Confirm Advance Directive Yes, not on file       Medical Interventions: Full interventions   Artificially Administered Nutrition: Feeding tube for a defined trial period     Other:    As far as possible, the palliative care team has discussed with patient / health care proxy about goals of care / treatment preferences for patient. HISTORY:     History obtained from: chart, staff, family     CHIEF COMPLAINT:  Cannot obtain due to patient factors      HPI/SUBJECTIVE:    The patient is:   [] Verbal and participatory  [x] Non-participatory due to: medical condition    Agitated w/ sitter, b/l restraints.      Clinical Pain Assessment (nonverbal scale for severity on nonverbal patients):   Clinical Pain Assessment  Severity: 0     Activity (Movement): Lying quietly, normal position    Duration: for how long has pt been experiencing pain (e.g., 2 days, 1 month, years)  Frequency: how often pain is an issue (e.g., several times per day, once every few days, constant)     FUNCTIONAL ASSESSMENT:     Palliative Performance Scale (PPS):  PPS: 20       PSYCHOSOCIAL/SPIRITUAL SCREENING:     Palliative IDT has assessed this patient for cultural preferences / practices and a referral made as appropriate to needs (Cultural Services, Patient Advocacy, Ethics, etc.)    Any spiritual / Amish concerns:  [] Yes /  [x] No    Caregiver Burnout:  [] Yes /  [x] No /  [] No Caregiver Present      Anticipatory grief assessment:   [x] Normal  / [] Maladaptive       ESAS Anxiety:      ESAS Depression:       Cannot obtain due to patient factors     REVIEW OF SYSTEMS:     Positive and pertinent negative findings in ROS are noted above in HPI. The following systems were [x] reviewed / [] unable to be reviewed as noted in HPI  Other findings are noted below. Systems: constitutional, ears/nose/mouth/throat, respiratory, gastrointestinal, genitourinary, musculoskeletal, integumentary, neurologic, psychiatric, endocrine. Positive findings noted below. Modified ESAS Completed by: provider   Fatigue: 10 Drowsiness: 9     Pain: 0         Anorexia: 10 Dyspnea: 0                    PHYSICAL EXAM:     From RN flowsheet:  Wt Readings from Last 3 Encounters:   07/31/21 123 lb 3.8 oz (55.9 kg)   07/26/21 134 lb 0.6 oz (60.8 kg)   06/17/21 148 lb 2.4 oz (67.2 kg)     Blood pressure 101/66, pulse 80, temperature 98.5 °F (36.9 °C), resp. rate 18, height 5' 7\" (1.702 m), weight 123 lb 3.8 oz (55.9 kg), SpO2 99 %.     Pain Scale 1: Visual  Pain Intensity 1: 0                 Last bowel movement, if known:     Constitutional:NAD, not waking up for me       HISTORY:     Active Problems:    Lactic acidosis (7/28/2021)      Past Medical History:   Diagnosis Date    Arthritis     Asthma     Benign prostate hyperplasia     [facility] [address]     Hypothyroidism     Spinal stenosis of lumbar region with radiculopathy 6/26/2013      Past Surgical History:   Procedure Laterality Date    HX OTHER SURGICAL cyst removed from back    HX TONSIL AND ADENOIDECTOMY        History reviewed. No pertinent family history. History reviewed, no pertinent family history. Social History     Tobacco Use    Smoking status: Never Smoker    Smokeless tobacco: Never Used   Substance Use Topics    Alcohol use: Yes     Comment: occ.      No Known Allergies   Current Facility-Administered Medications   Medication Dose Route Frequency    [START ON 8/11/2021] ceFAZolin (ANCEF) 2 g/20 mL in sterile water IV syringe  2 g IntraVENous ON CALL TO OR    [START ON 8/11/2021] gentamicin (GARAMYCIN) 279.6 mg in 0.9% sodium chloride 100 mL IVPB  5 mg/kg (Adjusted) IntraVENous ON CALL TO OR    [START ON 8/10/2021] levothyroxine (SYNTHROID) tablet 62.5 mcg  62.5 mcg Oral ACB    potassium bicarb-citric acid (EFFER-K) tablet 40 mEq  40 mEq Oral Q4H    haloperidol lactate (HALDOL) injection 1 mg  1 mg IntraMUSCular Q6H PRN    famotidine (PF) (PEPCID) 20 mg in 0.9% sodium chloride 10 mL injection  20 mg IntraVENous Q12H    rivastigmine (EXELON) 9.5 mg/24 hour patch 1 Patch  1 Patch TransDERmal DAILY    dextrose 5% infusion  50 mL/hr IntraVENous CONTINUOUS    sodium chloride (NS) flush 5-40 mL  5-40 mL IntraVENous PRN    acetaminophen (TYLENOL) tablet 650 mg  650 mg Oral Q6H PRN    Or    acetaminophen (TYLENOL) suppository 650 mg  650 mg Rectal Q6H PRN    enoxaparin (LOVENOX) injection 40 mg  40 mg SubCUTAneous DAILY    bisacodyL (DULCOLAX) suppository 10 mg  10 mg Rectal DAILY PRN    finasteride (PROSCAR) tablet 5 mg  5 mg Oral DAILY    sertraline (ZOLOFT) tablet 25 mg  25 mg Oral DAILY    tamsulosin (FLOMAX) capsule 0.4 mg  0.4 mg Oral DAILY          LAB AND IMAGING FINDINGS:     Lab Results   Component Value Date/Time    WBC 7.9 08/07/2021 03:27 AM    HGB 13.2 08/07/2021 03:27 AM    PLATELET 069 27/64/3768 03:27 AM     Lab Results   Component Value Date/Time    Sodium 138 08/09/2021 05:50 AM    Potassium 3.2 (L) 08/09/2021 05:50 AM Chloride 103 08/09/2021 05:50 AM    CO2 30 08/09/2021 05:50 AM    BUN 16 08/09/2021 05:50 AM    Creatinine 0.59 (L) 08/09/2021 05:50 AM    Calcium 8.7 08/09/2021 05:50 AM    Magnesium 1.9 08/07/2021 03:27 AM      Lab Results   Component Value Date/Time    Alk. phosphatase 89 07/28/2021 02:59 PM    Protein, total 7.8 07/28/2021 02:59 PM    Albumin 4.4 07/28/2021 02:59 PM    Globulin 3.4 07/28/2021 02:59 PM     Lab Results   Component Value Date/Time    INR 1.1 08/04/2021 07:14 PM    Prothrombin time 11.1 08/04/2021 07:14 PM    aPTT 29.0 08/04/2021 07:14 PM    aPTT 25.2 06/14/2013 11:50 AM      No results found for: IRON, FE, TIBC, IBCT, PSAT, FERR   No results found for: PH, PCO2, PO2  No components found for: GLPOC   No results found for: CPK, CKMB             Total time: 25 min   Counseling / coordination time, spent as noted above: 20 min   > 50% counseling / coordination?: yes    Prolonged service was provided for  []30 min   []75 min in face to face time in the presence of the patient, spent as noted above. Time Start:   Time End:   Note: this can only be billed with 93833 (initial) or 00562 (follow up). If multiple start / stop times, list each separately.

## 2021-08-09 NOTE — PROGRESS NOTES
[facility] Adult  Hospitalist Group                                                                                          Hospitalist Progress Note  Joby Cornejo MD  Answering service: [phone] from in house phone      NAME:  Nikkie Campos  :  3/23/1933  MRN:  [id number]      Admission Summary: \"The patient is an 27-year-old male with longstanding history of dementia of Alzheimer's type. The patient is in a Memory Unit because of his agitation and psych issues, the patient was being transferred to the Psychiatry unit at [facility]. In [facility], the facility had wanted a physical and routine labs to be done at a facility before accepting the patient. The patient was evaluated in the ER. The patient upon arrival to the [facility] ER was extremely combative and agitated, received Geodon and some Ativan after which he became less combative and manageable. The patient is unable to give any history in the ER. Further questioning from the family members, it was noted that he is scheduled to undergo some TURP and has had intermittent urinary retention for which he has chronic Orta. The patient has a history of chronic urinary tract infections in the past.  The urine that was obtained was extremely dark colored for which he received a dose of vancomycin. The labs showed significantly elevated lactic acidosis of 4.5 for which IV fluids were administered. The patient's lactic acid came down to 1.5 after that. Currently, he is resting comfortably, responds to the verbal and tactile stimuli. No signs of agitation were noted. No further history can be obtained because of the memory\"    Interval history / Subjective:   F/u for dementia with behavioral disturbance  Somnolent but arousable, A&Ox1  NAD     Assessment & Plan:     dementia with behavioral disturbance likely delirium d/t UTI  Chronic catheter associated UTI  S/p meropenem    Lactic acidosis, resolved.     Dementia of Alzheimer's type  cont exelon patch  haldol IM as needed (hasn't needed)    History of benign prostatic hypertrophy and chronic rodriguez  continue with the Flomax and Proscar  Do not remove rodriguez   Urology consulted- TURP is scheduled at [facility] at 12:30PM on Wednesday 8/11/21  Recent urine cx neg    Hypothyroidism, on thyroid replacement  TSH elevated, increase LT4 from 50 to 62. 5mcg    Hypokalemia, replete    Dysphagia and malnutrition due to dementia and poor PO intake  continue dextrose IV bag, PO as able  palliative care following    Code status: Full Code  DVT prophylaxis: Lovenox    Care Plan discussed with: patient  Anticipated Disposition: tbd  Anticipated Discharge: tbd     Hospital Problems  Never Reviewed        Codes Class Noted POA    Lactic acidosis ICD-10-CM: E87.2  ICD-9-CM: 276.2  7/28/2021 Unknown                Review of Systems:   Unable to obtain due to current state    Vital Signs:    Last 24hrs VS reviewed since prior progress note.  Most recent are:  Visit Vitals  /66   Pulse 88   Temp 98.5 °F (36.9 °C)   Resp 18   Ht 5' 7\" (1.702 m)   Wt 55.9 kg (123 lb 3.8 oz)   SpO2 100%   BMI 19.30 kg/m²     Patient Vitals for the past 24 hrs:   Temp Pulse Resp BP SpO2   08/09/21 1057 98.5 °F (36.9 °C)  18 101/66    08/09/21 0534  88 18 134/68 100 %   08/09/21 0249 97.7 °F (36.5 °C) 100 18 124/73 100 %   08/09/21 0200  88 18 110/62 98 %   08/09/21 0000  80 18 (!) 104/59 98 %   08/08/21 2200  88 18 110/62 100 %   08/08/21 2116 98.8 °F (37.1 °C) 82 18 99/67 96 %   08/08/21 2000  (P) 78 (P) 18 (P) 104/62    08/08/21 1522 97.8 °F (36.6 °C) 69 15 116/72 93 %     No intake or output data in the 24 hours ending 08/09/21 1059     Physical Examination:        Constitutional:  nad   Resp:  No accessory muscle use, no obvious wheezing or rales   CV:  Regular rhythm, normal rate    GI:  soft, no grimacing with palpation  : chronic rodriguez    Musculoskeletal:  generalized muscle atrophy, no cyanosis, MUNOZ Neurologic:  Moves all extremities, only oriented to self (name)  Psych: baseline dementia, not agitated            Data Review:     Labs:     Recent Labs     08/07/21  0327   WBC 7.9   HGB 13.2   HCT 37.6        Recent Labs     08/09/21  0550 08/07/21  0327    140   K 3.2* 3.6    102   CO2 30 31   BUN 16 15   CREA 0.59* 0.63*   * 129*   CA 8.7 8.9   MG  --  1.9       No results for input(s): INR, PTP, APTT, INREXT, INREXT in the last 72 hours.        Medications Reviewed:     ______________________________________________________________________  EXPECTED LENGTH OF STAY: - - -  ACTUAL LENGTH OF STAY:          Cheryl Lopez MD

## 2021-08-09 NOTE — MANAGEMENT PLAN
I saw Mr. Logan Reid this morning. I have been out for over 2 weeks on family leave. I was contacted by Dr. Barbara Santos and my  late last week while I was out about Mr. Logan Reid being admitted to [facility]. Chart reviewed. Seems to be clinically deteriorating even since I saw him in clinic recently. He does not remember discussion of TURP. I have mostly been discussing plan of care with his son, Ita Craig. Initially scheduled for TURP with me this week on Wed 8/11 at [facility]. I do have surgical privileges at [facility]. First need to have another discussion whether benefits of TURP outweigh risks. I am hesitant to believe that TURP alone will get him back to his baseline but still open to the idea of surgical intervention if his family is aware of all risks after a guera discussion (which we've already had and I will reinforce). Can try to reschedule that procedure to [facility] if Ronald/family want to proceed but I currently have no idea when we could make that work. Will update chart once I have contacted his family to keep everyone up to date on plans moving forward. Terrie    Patient's surgery was moved last Friday by our practice and is currently scheduled for [facility] at 12:30PM on Wednesday 8/11/21. Will keep in place for now. NPO at 12AM tomorrow evening, will order T&S. Continue Orta, flomax/finasteride. No other changes for now. Will cover with Ancef & Gentamicin for OR. I was able to talk to his son, [facility] Aneta [place], today on the phone. We reviewed risks of procedure - dysuria, hematuria, infection, sepsis, injury to  structures. We discussed possibility that without UDS I cannot say for sure that bladder has adequate detrusor function, meaning he may not be able to get [facility] despite TURP. Understands that dementia very well may be progressing and that while TURP may (or may not) help with  issues it may not solve all medical problems, and that he may not get back to baseline.  Unable to reach other son today Monique Luke) despite multiple attempts.

## 2021-08-09 NOTE — PROGRESS NOTES
Problem: Self Care Deficits Care Plan (Adult)  Goal: *Acute Goals and Plan of Care (Insert Text)  Description:   FUNCTIONAL STATUS PRIOR TO ADMISSION: Patient is poor historian. Per chart review. He lives in [address] with wife with assist with ADL tasks. HOME SUPPORT: The patient lived with at [facility]. Occupational Therapy Goals  Initiated 8/9/2021  1. Patient will perform grooming with maximal assistance within 7 day(s). 2.  Patient will perform upper body dressing with maximal assistance within 7 day(s). 3.  Patient will perform self-feeding with moderate assistance  within 7 day(s). 4.  Patient will perform toilet transfers with moderate assistance  within 7 day(s). 8/9/2021 0951 by Merlinda Ni, OT  Outcome: Progressing Towards Goal  8/9/2021 0949 by Merlinda Ni, OT  Outcome: Progressing Towards Goal   OCCUPATIONAL THERAPY EVALUATION  Patient: Tony Anne (3 y.o. male)  Date: 8/9/2021  Primary Diagnosis: Lactic acidosis [E87.2]  Procedure(s) (LRB):  STANDARD BIPOLAR TRANSURETHRAL RESECTION OF PROSTATE WITH ELECTOCAUTERY LOOP (URGENT) (N/A)     Precautions: Fall , bed alarm      ASSESSMENT  Based on the objective data described below, the patient presents with severe impairment with ADL tasks secondary to confusion with limited command follow and recent agitation. He is disoriented x4 with limited command following. Patient received EOB with PCT present, able to achieve supine>sit with supervision, attempting to get OOB and need for assist to return to supine secondary to confusion. While in supine total A for combing hair with hand over hand. Patient with potential transfer to [facility] for pre scheduled TURP this week. Recommend continued OT to maximize participation in ADL tasks to maximized indep with ADL tasks, decrease caregiver burden with ADL tasks, and decrease fall risk.        Current Level of Function Impacting Discharge (ADLs/self-care): total A    Functional Outcome Measure: The patient scored 5/100 on the Barthel Index outcome measure which is indicative of severe impairment with ADL tasks. Other factors to consider for discharge: Patient from senior living with wife. Increased agitation, per chart review potential need for geriatric psych     Patient will benefit from skilled therapy intervention to address the above noted impairments. PLAN :  Recommendations and Planned Interventions: self care training, functional mobility training, therapeutic exercise, balance training, therapeutic activities, endurance activities, patient education, home safety training and family training/education    Frequency/Duration: Patient will be followed by occupational therapy 5 times a week to address goals. Recommendation for discharge: (in order for the patient to meet his/her long term goals)  To be determined: potential surgery--need for 24 hour assistance    This discharge recommendation:  Has been made in collaboration with the attending provider and/or case management    IF patient discharges home will need the following DME: TBD with progression and dc plans       SUBJECTIVE:   Patient stated [facility].     OBJECTIVE DATA SUMMARY:   HISTORY:   Past Medical History:   Diagnosis Date    Arthritis     Asthma     Benign prostate hyperplasia     Dementia ([facility] [address]     Hypothyroidism     Spinal stenosis of lumbar region with radiculopathy 6/26/2013     Past Surgical History:   Procedure Laterality Date    HX OTHER SURGICAL      cyst removed from back    HX TONSIL AND ADENOIDECTOMY         Expanded or extensive additional review of patient history:     Home Situation  Home Environment: Assisted living (with wife)    EXAMINATION OF PERFORMANCE DEFICITS:  Cognitive/Behavioral Status:  Neurologic State: Confused  Orientation Level: Disoriented X4         Hearing:   Auditory  Auditory Impairment: None    Vision/Perceptual:                           Acuity:  (unable to assess secondary to confusion)         Range of Motion:  AROM: Generally decreased, functional                         Strength:  Strength: Generally decreased, functional                Coordination:  Coordination: Generally decreased, functional  Fine Motor Skills-Upper: Right Intact; Left Intact    Gross Motor Skills-Upper: Right Intact; Left Intact    Tone & Sensation:  Tone: Normal  Sensation:  (unable to assess secondary to confusion)                      Balance:  Sitting: Intact; High guard    Functional Mobility and Transfers for ADLs:  Bed Mobility:  Supine to Sit: Supervision    Transfers:       ADL Assessment:       Oral Facial Hygiene/Grooming: Total assistance    Bathing: Total assistance    Upper Body Dressing: Total assistance    Lower Body Dressing: Total assistance    Toileting: Total assistance          ADL Intervention and task modifications:       Grooming  Brushing/Combing Hair: Total assistance (dependent)            Functional Measure:  Barthel Index:    Bathin  Bladder: 0  Bowels: 0  Groomin  Dressin  Feedin  Mobility: 0  Stairs: 0  Toilet Use: 0  Transfer (Bed to Chair and Back): 5  Total: 5/100        The Barthel ADL Index: Guidelines  1. The index should be used as a record of what a patient does, not as a record of what a patient could do. 2. The main aim is to establish degree of independence from any help, physical or verbal, however minor and for whatever reason. 3. The need for supervision renders the patient not independent. 4. A patient's performance should be established using the best available evidence. Asking the patient, friends/relatives and nurses are the usual sources, but direct observation and common sense are also important. However direct testing is not needed. 5. Usually the patient's performance over the preceding 24-48 hours is important, but occasionally longer periods will be relevant.   6. Middle categories imply that the patient supplies over 50 per cent of the effort. 7. Use of aids to be independent is allowed. Kodi Mckenna., Barthel, D.W. (8551). Functional evaluation: the Barthel Index. [address] [facility] (14)2. KEVIN Burr, Darlene Hammond., Radha Galo., [place], [address] (1999). Measuring the change indisability after inpatient rehabilitation; comparison of the responsiveness of the Barthel Index and Functional [place] Measure. Journal of Neurology, Neurosurgery, and Psychiatry, 66(4), 337-120. Matthews Lombard, N.[place].A, DAYDAY Cortes, & Rell Bradford M.A. (2004.) Assessment of post-stroke quality of life in cost-effectiveness studies: The usefulness of the Barthel Index and the EuroQoL-5D. Quality of Life Research, 15, 353-52       Occupational Therapy Evaluation Charge Determination   History Examination Decision-Making   MEDIUM Complexity : Expanded review of history including physical, cognitive and psychosocial  history  MEDIUM Complexity : 3-5 performance deficits relating to physical, cognitive , or psychosocial skils that result in activity limitations and / or participation restrictions MEDIUM Complexity : Patient may present with comorbidities that affect occupational performnce. Miniml to moderate modification of tasks or assistance (eg, physical or verbal ) with assesment(s) is necessary to enable patient to complete evaluation       Based on the above components, the patient evaluation is determined to be of the following complexity level: MEDIUM  Pain Rating:  Unable to verbalize pain level, no grimacing    Activity Tolerance:   Fair    After treatment patient left in no apparent distress:    Supine in bed, Call bell within reach and Restraints    COMMUNICATION/EDUCATION:   The patients plan of care was discussed with: Registered nurse. Patient is unable to participate in goal setting and plan of care. This patients plan of care is appropriate for delegation to [facility].     Thank you for this referral.  Katherine Bey Francis Perez, OT  Time Calculation: 15 mins

## 2021-08-09 NOTE — PROGRESS NOTES
Comprehensive Nutrition Assessment    Type and Reason for Visit: Consult (tube feeding)    Nutrition Recommendations/Plan:    Continue Reg diet with Ensure Clear (apple) and Ensure Enlive (derrick) once daily each. Encourage PO intakes, assistance as needed. If total EN planned (NPO) either option 1 intermittent or option 2 a 12 hr. Cycle:    1. Jevity 1.5;  240 mL x 5 feedings/day; flush 100 mL water ac/pc each feed or     2. Jevity 1.5 @ 100 mL x 12 hr, flush tube 100 mL ac/pc start/stop of cycle;                   additional flush 200 ml QID   Either option, discontinue D5. If PO diet continued, RD can modify about to 1 liter today Jevity 1.5/day. Nutrition Assessment:    Past Medical History:   Diagnosis Date    Arthritis     Asthma     Benign prostate hyperplasia     Dementia ([address]     Hypothyroidism     Spinal stenosis of lumbar region with radiculopathy 6/26/2013     Pt with longstanding hx Alzheimer's, resides in a memory care facility [facility]; Current placement pending. Chronic UTIs with indwelling Orta cath. Admitted [facility] for labs, combative/agitated. Pt found to have elevated lactic acidosis and dehydration, admitted and started on IVFs. Per MD, lactic acid resolved. Dextrose currently providing ~204 kcals. Admit BMI 19, underweight 52.1 kg (~114#)  bedscale, subsequent bedscale wt 55.9 kg (~123#). Comparing 114# to last recorded wt 148# (6/17/21) reflects significant and unplanned net wt loss 34# (23%) x ~ 60 days. Unable to obtain UBW or wt  hx from pt. RD added ONS on 7/30/21, but pt with poor intake, fed by staff. Has been restrained with agitation and combative. Noted family brought in R Florina 98, \"Lean Body\" in 17 Oz. Container (280 kcal and 40 gm pro), but two unopened at bedside. Per RN eats better when family present, otherwise intake has been poor. ED noted not eating x 7 days PTA on admit. K+ 3.2 low today (8/9/21);  BUN/Creat 27 with poor PO intake and restraints. Appears natural teeth intact. No prior SLP evaluations for ?swallow ability; Diet order IDDSI SB6. Currently a feeder. Last BM 8/8/21. Nutrition consult for EN. Code status-full. RN noted pt pulled out NG. Palliative noted \"feeding tube for defined period\". ? Hx EN in the past.    . No hx DM. IV D5% @ 50 mL/hr. Now with average poor intake. Could trial either intermittent feeds or a 12 hr. Cycle to allow some time off the feeding. Suggest Jevity 1.5, recommended option above. EN Options:  Either option = 1800 kcal, 77 gm pro, 258 gm CHO and 1912 ml total free water assuming no PO or IV fluids. Malnutrition Assessment:  Malnutrition Status: Moderate malnutrition    Context:  Acute illness     Findings of the 6 clinical characteristics of malnutrition:   Energy Intake:  7 - 50% or less of est energy requirements for 5 or more days  Weight Loss:  7.00 - Greater than 7.5% over 3 months     Body Fat Loss:  1 - Mild body fat loss, Triceps, Buccal region   Muscle Mass Loss:  1 - Mild muscle mass loss, Clavicles (pectoralis & deltoids), Temples (temporalis)  Fluid Accumulation:  No significant fluid accumulation,     Strength:  Not performed       Nutritionally Significant Medications: Pepcid given, Synthroid (refused). Estimated Daily Nutrient Needs:  Energy (kcal): 1900 kcal ; Weight Used for Energy Requirements: Admission  Protein (g): 70 gm (~1.2 gm/kg);  Weight Used for Protein Requirements: Current  Fluid (ml/day): 1900 mL; Method Used for Fluid Requirements: 1 ml/kcal    Nutrition Related Findings:  Pt pulled NG out; RD consult for EN     BM: Abd WDL  Edema: None noted  Wounds:  None     Recent Labs     08/09/21  0550 08/07/21  0327   * 129*   BUN 16 15   CREA 0.59* 0.63*    140   K 3.2* 3.6    102   CO2 30 31   CA 8.7 8.9   MG  --  1.9       Recent Labs     08/07/21  0327   WBC 7.9   HGB 13.2   HCT 37.6          Current Nutrition Therapies:   Diet: Regular  Supplements: Ensure Clear apple; Ensure [employer] each once/daily; Family bringing in DARON Kelley (\"Lean Body\")  from home too  Additional Caloric Sources: D5 @ 50 mL/hr = 204 kcal    Meal intake:   Patient Vitals for the past 168 hrs:   % Diet Eaten   08/07/21 1343 26 - 50%     Supplement Intake: No data found.     Anthropometric Measures:  · Height:  5' 7\" (170.2 cm)  · Current Body Wt:  55.9 kg (123 lb 3.8 oz) (7/31/21)   · Admission Body Wt:  114 lb 13.8 oz    · Ideal Body Wt:  148:  83.3 %   · BMI Categories:  Underweight (BMI less than 22) age over 72     Wt Readings from Last 10 Encounters:   07/31/21 55.9 kg (123 lb 3.8 oz)   07/26/21 60.8 kg (134 lb 0.6 oz)   06/17/21 67.2 kg (148 lb 2.4 oz)   04/24/21 67.2 kg (148 lb 2.4 oz)   01/07/21 66.9 kg (147 lb 7.8 oz)   06/27/13 63.7 kg (140 lb 7 oz)   06/12/13 64 kg (141 lb)       Nutrition Diagnosis:   · Unintended weight loss related to cognitive or neurological impairment, inadequate protein-energy intake as evidenced by intake 0-25%, poor intake prior to admission, BMI, weight loss greater than or equal to 5% in 1 month, moderate loss of subcutaneous fat, moderate muscle loss      Nutrition Interventions:   Food and/or Nutrient Delivery: Continue current diet  Nutrition Education and Counseling: No recommendations at this time (Dementia; periods agitation)  Coordination of Nutrition Care: Continue to monitor while inpatient    Goals:  Accept min 100% one ONS daily and 25% all meals or meet 50% EN next 48 hr.        Nutrition Monitoring and Evaluation:   Behavioral-Environmental Outcomes: None identified  Food/Nutrient Intake Outcomes: Food and nutrient intake, Supplement intake, Enteral nutrition intake/tolerance  Physical Signs/Symptoms Outcomes: Biochemical data, Chewing or swallowing, Meal time behavior, Nutrition focused physical findings, Weight    Discharge Planning:    Continue oral nutrition supplement (If EN started, continue at discharge) Lupe Ortiz RD     Contact via [employer]

## 2021-08-10 LAB
ABO + RH BLD: NORMAL
ANION GAP SERPL CALC-SCNC: 9 MMOL/L (ref 5–15)
BLOOD GROUP ANTIBODIES SERPL: NORMAL
BUN SERPL-MCNC: 18 MG/DL (ref 6–20)
BUN/CREAT SERPL: 25 (ref 12–20)
CALCIUM SERPL-MCNC: 8.7 MG/DL (ref 8.5–10.1)
CHLORIDE SERPL-SCNC: 102 MMOL/L (ref 97–108)
CO2 SERPL-SCNC: 25 MMOL/L (ref 21–32)
CREAT SERPL-MCNC: 0.73 MG/DL (ref 0.7–1.3)
GLUCOSE SERPL-MCNC: 107 MG/DL (ref 65–100)
MAGNESIUM SERPL-MCNC: 1.8 MG/DL (ref 1.6–2.4)
POTASSIUM SERPL-SCNC: 4.6 MMOL/L (ref 3.5–5.1)
SODIUM SERPL-SCNC: 136 MMOL/L (ref 136–145)
SPECIMEN EXP DATE BLD: NORMAL

## 2021-08-10 PROCEDURE — 74011250636 HC RX REV CODE- 250/636: Performed by: INTERNAL MEDICINE

## 2021-08-10 PROCEDURE — 83735 ASSAY OF MAGNESIUM: CPT

## 2021-08-10 PROCEDURE — 74011250637 HC RX REV CODE- 250/637: Performed by: FAMILY MEDICINE

## 2021-08-10 PROCEDURE — 74011250636 HC RX REV CODE- 250/636: Performed by: PHYSICAL MEDICINE & REHABILITATION

## 2021-08-10 PROCEDURE — 74011000250 HC RX REV CODE- 250: Performed by: INTERNAL MEDICINE

## 2021-08-10 PROCEDURE — 74011250637 HC RX REV CODE- 250/637: Performed by: INTERNAL MEDICINE

## 2021-08-10 PROCEDURE — 36415 COLL VENOUS BLD VENIPUNCTURE: CPT

## 2021-08-10 PROCEDURE — 80048 BASIC METABOLIC PNL TOTAL CA: CPT

## 2021-08-10 PROCEDURE — 65270000029 HC RM PRIVATE

## 2021-08-10 PROCEDURE — 86901 BLOOD TYPING SEROLOGIC RH(D): CPT

## 2021-08-10 RX ADMIN — ENOXAPARIN SODIUM 40 MG: 40 INJECTION SUBCUTANEOUS at 09:58

## 2021-08-10 RX ADMIN — LEVOTHYROXINE SODIUM 62.5 MCG: 0.12 TABLET ORAL at 07:30

## 2021-08-10 RX ADMIN — Medication 10 ML: at 20:33

## 2021-08-10 RX ADMIN — HALOPERIDOL LACTATE 1 MG: 5 INJECTION, SOLUTION INTRAMUSCULAR at 23:43

## 2021-08-10 RX ADMIN — HALOPERIDOL LACTATE 1 MG: 5 INJECTION, SOLUTION INTRAMUSCULAR at 13:56

## 2021-08-10 RX ADMIN — FAMOTIDINE 20 MG: 10 INJECTION, SOLUTION INTRAVENOUS at 09:59

## 2021-08-10 RX ADMIN — POTASSIUM CHLORIDE 10 MEQ: 7.46 INJECTION, SOLUTION INTRAVENOUS at 01:07

## 2021-08-10 RX ADMIN — FAMOTIDINE 20 MG: 10 INJECTION, SOLUTION INTRAVENOUS at 20:33

## 2021-08-10 NOTE — PROGRESS NOTES
JERO: Noted plan for surgery at [facility] per MD note. Discharge plan TBD. The patient is from [facility] and is currently in restraints. RUR: 27%    CM following for discharge needs.     Ross Holman RN/CRM

## 2021-08-10 NOTE — PROGRESS NOTES
1008:  Patient spit out and refused all oral medications due this morning. 1945:  Bedside shift change report given to Tommie Frazier RN (oncoming nurse) by Lupe Abbasi RN (offgoing nurse). Report included the following information SBAR, Kardex, Intake/Output, MAR and Recent Results.

## 2021-08-10 NOTE — PROGRESS NOTES
[facility] Adult  Hospitalist Group                                                                                          Hospitalist Progress Note  Armando Molina MD  Answering service: [phone] from in house phone      NAME:  Judge De La Fuente  :  3/23/1933  MRN:  [id number]      Admission Summary: \"The patient is an 80-year-old male with longstanding history of dementia of Alzheimer's type. The patient is in a Memory Unit because of his agitation and psych issues, the patient was being transferred to the Psychiatry unit at [place]. In [place], the facility had wanted a physical and routine labs to be done at a facility before accepting the patient. The patient was evaluated in the ER. The patient upon arrival to the [facility] ER was extremely combative and agitated, received Geodon and some Ativan after which he became less combative and manageable. The patient is unable to give any history in the ER. Further questioning from the family members, it was noted that he is scheduled to undergo some TURP and has had intermittent urinary retention for which he has chronic Orta. The patient has a history of chronic urinary tract infections in the past.  The urine that was obtained was extremely dark colored for which he received a dose of vancomycin. The labs showed significantly elevated lactic acidosis of 4.5 for which IV fluids were administered. The patient's lactic acid came down to 1.5 after that. Currently, he is resting comfortably, responds to the verbal and tactile stimuli. No signs of agitation were noted.   No further history can be obtained because of the memory\"    Interval history / Subjective:   F/u for dementia with behavioral disturbance  Awake, alert, A&Ox1  Required prn haldol overnight for agitation  NAD     Assessment & Plan:     dementia with behavioral disturbance likely delirium d/t UTI  Chronic catheter associated UTI  S/p meropenem    Lactic acidosis, resolved. Dementia of Alzheimer's type  cont exelon patch  haldol IM as needed    History of benign prostatic hypertrophy and chronic rodriguze  continue with the Flomax and Proscar  Do not remove rodriguez   Urology consulted- TURP is scheduled at [facility] at 12:30PM on Wednesday 8/11/21  Recent urine cx neg    Hypothyroidism, on thyroid replacement  TSH elevated, increase LT4 from 50 to 62. 5mcg    Hypokalemia, repleted    Dysphagia and malnutrition due to dementia and poor PO intake  Encourage PO as able  Did not tolerate ngt  palliative care following    Code status: Full Code  DVT prophylaxis: Lovenox    Care Plan discussed with: patient  Anticipated Disposition: tbd  Anticipated Discharge: tbd     Hospital Problems  Never Reviewed        Codes Class Noted POA    Lactic acidosis ICD-10-CM: E87.2  ICD-9-CM: 276.2  7/28/2021 Unknown                Review of Systems:   Unable to obtain due to current state    Vital Signs:    Last 24hrs VS reviewed since prior progress note.  Most recent are:  Visit Vitals  /77 (BP 1 Location: Right upper arm, BP Patient Position: At rest)   Pulse (!) 107   Temp 97.9 °F (36.6 °C)   Resp 17   Ht 5' 7\" (1.702 m)   Wt 55.9 kg (123 lb 3.8 oz)   SpO2 99%   BMI 19.30 kg/m²     Patient Vitals for the past 24 hrs:   Temp Pulse Resp BP SpO2   08/10/21 0910 97.9 °F (36.6 °C) (!) 107 17 117/77 99 %   08/10/21 0216 98.2 °F (36.8 °C) 98 18 132/70 96 %   08/09/21 2035 98.2 °F (36.8 °C) (!) 118 18 130/70 96 %   08/09/21 1505 97.9 °F (36.6 °C) (!) 116 18 113/81 97 %       Intake/Output Summary (Last 24 hours) at 8/10/2021 1335  Last data filed at 8/10/2021 1331  Gross per 24 hour   Intake    Output 1250 ml   Net -1250 ml        Physical Examination:        Constitutional:  nad   Resp:  No accessory muscle use, no obvious wheezing or rales   CV:  Regular rhythm, normal rate    GI:  soft, no grimacing with palpation  : chronic rodriguez    Musculoskeletal:  generalized muscle atrophy, no cyanosis, MUNOZ    Neurologic:  Moves all extremities, only oriented to self (name)  Psych: baseline dementia, not agitated            Data Review:     Labs:     No results for input(s): WBC, HGB, HCT, PLT, HGBEXT, HCTEXT, PLTEXT, HGBEXT, HCTEXT, PLTEXT in the last 72 hours. Recent Labs     08/10/21  0225 08/09/21  0550    138   K 4.6 3.2*    103   CO2 25 30   BUN 18 16   CREA 0.73 0.59*   * 121*   CA 8.7 8.7   MG 1.8  --        No results for input(s): INR, PTP, APTT, INREXT, INREXT in the last 72 hours.        Medications Reviewed:     ______________________________________________________________________  EXPECTED LENGTH OF STAY: - - -  ACTUAL LENGTH OF STAY:          Cathryn Torres MD

## 2021-08-10 NOTE — PROGRESS NOTES
Bedside and Verbal shift change report given to Rubio Way RN (oncoming nurse) by Catherine Aden RN (offgoing nurse). Report included the following information SBAR.

## 2021-08-10 NOTE — PROGRESS NOTES
Vitals w/ MEWS Score (last day)     Date/Time MEWS Score Pulse Resp Temp BP Level of Consciousness SpO2    08/09/21 2035  3  (!) 118  18  98.2 °F (36.8 °C)  130/70  Alert (0)  96 %    08/09/21 1505  3  (!) 116  18  97.9 °F (36.6 °C)  113/81  Alert (0)  97 %    08/09/21 1057  1  80  18  98.5 °F (36.9 °C)  101/66  Alert (0)  99 %    08/09/21 0534    88  18    134/68    100 %    08/09/21 0249  1  100  18  97.7 °F (36.5 °C)  124/73  Alert (0)  100 %    08/09/21 0200    88  18    110/62    98 %    08/09/21 0000    80  18    (!) 104/59    98 %    08/08/21 2200    88  18    110/62    100 %    08/08/21 2116  2  82  18  98.8 °F (37.1 °C)  99/67  Alert (0)  96 %    08/08/21 1522  1  69  15  97.8 °F (36.6 °C)  116/72  Alert (0)  93 %    08/08/21 0600    74  16    120/66        08/08/21 0400    65  18    (!) 157/60        08/08/21 0351        97.3 °F (36.3 °C)    Alert (0)      08/08/21 0200    64  18    106/66    94 %    08/08/21 0000    66  16    113/65    94 %              Patient agitated and trying to get out of bed. Notified Marialuisa Fallon NP. Will try PRN haldol and continue to monitor.

## 2021-08-10 NOTE — PROGRESS NOTES
Bedside and Verbal shift change report given to Orlando Knapp (oncoming nurse) by Dahlia Murphy (offgoing nurse). Report included the following information SBAR, Kardex, Intake/Output, MAR and Recent Results.

## 2021-08-11 ENCOUNTER — ANESTHESIA EVENT (OUTPATIENT)
Dept: SURGERY | Age: 86
DRG: 666 | End: 2021-08-11
Payer: MEDICARE

## 2021-08-11 ENCOUNTER — ANESTHESIA (OUTPATIENT)
Dept: SURGERY | Age: 86
DRG: 666 | End: 2021-08-11
Payer: MEDICARE

## 2021-08-11 PROCEDURE — 74011250636 HC RX REV CODE- 250/636: Performed by: UROLOGY

## 2021-08-11 PROCEDURE — 77030028158 HC ELECTRD BISOLSR PROST RWOL -B: Performed by: UROLOGY

## 2021-08-11 PROCEDURE — 0VB08ZZ EXCISION OF PROSTATE, VIA NATURAL OR ARTIFICIAL OPENING ENDOSCOPIC: ICD-10-PCS | Performed by: UROLOGY

## 2021-08-11 PROCEDURE — 74011000258 HC RX REV CODE- 258: Performed by: UROLOGY

## 2021-08-11 PROCEDURE — 77030040831 HC BAG URINE DRNG MDII -A: Performed by: UROLOGY

## 2021-08-11 PROCEDURE — 74011250637 HC RX REV CODE- 250/637: Performed by: FAMILY MEDICINE

## 2021-08-11 PROCEDURE — 76210000016 HC OR PH I REC 1 TO 1.5 HR: Performed by: UROLOGY

## 2021-08-11 PROCEDURE — 2709999900 HC NON-CHARGEABLE SUPPLY: Performed by: UROLOGY

## 2021-08-11 PROCEDURE — 77030034696 HC CATH URETH FOL 2W BARD -A: Performed by: UROLOGY

## 2021-08-11 PROCEDURE — 65270000029 HC RM PRIVATE

## 2021-08-11 PROCEDURE — 74011000250 HC RX REV CODE- 250: Performed by: NURSE ANESTHETIST, CERTIFIED REGISTERED

## 2021-08-11 PROCEDURE — 74011000250 HC RX REV CODE- 250: Performed by: UROLOGY

## 2021-08-11 PROCEDURE — 99233 SBSQ HOSP IP/OBS HIGH 50: CPT | Performed by: PHYSICAL MEDICINE & REHABILITATION

## 2021-08-11 PROCEDURE — 77030019908 HC STETH ESOPH SIMS -A: Performed by: NURSE ANESTHETIST, CERTIFIED REGISTERED

## 2021-08-11 PROCEDURE — 76010000149 HC OR TIME 1 TO 1.5 HR: Performed by: UROLOGY

## 2021-08-11 PROCEDURE — 74011000250 HC RX REV CODE- 250: Performed by: INTERNAL MEDICINE

## 2021-08-11 PROCEDURE — 77030021162 HC TU IRR ENDOSC BAXT -A: Performed by: UROLOGY

## 2021-08-11 PROCEDURE — 88305 TISSUE EXAM BY PATHOLOGIST: CPT

## 2021-08-11 PROCEDURE — 77030008684 HC TU ET CUF COVD -B: Performed by: NURSE ANESTHETIST, CERTIFIED REGISTERED

## 2021-08-11 PROCEDURE — 77030026438 HC STYL ET INTUB CARD -A: Performed by: NURSE ANESTHETIST, CERTIFIED REGISTERED

## 2021-08-11 PROCEDURE — 74011250636 HC RX REV CODE- 250/636: Performed by: STUDENT IN AN ORGANIZED HEALTH CARE EDUCATION/TRAINING PROGRAM

## 2021-08-11 PROCEDURE — 74011250636 HC RX REV CODE- 250/636: Performed by: NURSE ANESTHETIST, CERTIFIED REGISTERED

## 2021-08-11 PROCEDURE — 74011250636 HC RX REV CODE- 250/636: Performed by: INTERNAL MEDICINE

## 2021-08-11 PROCEDURE — 76060000034 HC ANESTHESIA 1.5 TO 2 HR: Performed by: UROLOGY

## 2021-08-11 PROCEDURE — 77030013079 HC BLNKT BAIR HGGR 3M -A: Performed by: NURSE ANESTHETIST, CERTIFIED REGISTERED

## 2021-08-11 PROCEDURE — 77030019927 HC TBNG IRR CYSTO BAXT -A: Performed by: UROLOGY

## 2021-08-11 RX ORDER — DEXMEDETOMIDINE HYDROCHLORIDE 100 UG/ML
INJECTION, SOLUTION INTRAVENOUS AS NEEDED
Status: DISCONTINUED | OUTPATIENT
Start: 2021-08-11 | End: 2021-08-11 | Stop reason: HOSPADM

## 2021-08-11 RX ORDER — DIPHENHYDRAMINE HYDROCHLORIDE 50 MG/ML
12.5 INJECTION, SOLUTION INTRAMUSCULAR; INTRAVENOUS AS NEEDED
Status: DISCONTINUED | OUTPATIENT
Start: 2021-08-11 | End: 2021-08-11 | Stop reason: HOSPADM

## 2021-08-11 RX ORDER — DEXTROSE MONOHYDRATE AND SODIUM CHLORIDE 5; .45 G/100ML; G/100ML
75 INJECTION, SOLUTION INTRAVENOUS CONTINUOUS
Status: DISCONTINUED | OUTPATIENT
Start: 2021-08-11 | End: 2021-08-13 | Stop reason: HOSPADM

## 2021-08-11 RX ORDER — MIDAZOLAM HYDROCHLORIDE 1 MG/ML
0.5 INJECTION, SOLUTION INTRAMUSCULAR; INTRAVENOUS
Status: DISCONTINUED | OUTPATIENT
Start: 2021-08-11 | End: 2021-08-11 | Stop reason: HOSPADM

## 2021-08-11 RX ORDER — SODIUM CHLORIDE 9 MG/ML
125 INJECTION, SOLUTION INTRAVENOUS CONTINUOUS
Status: DISCONTINUED | OUTPATIENT
Start: 2021-08-11 | End: 2021-08-11 | Stop reason: HOSPADM

## 2021-08-11 RX ORDER — SODIUM CHLORIDE 9 MG/ML
1000 INJECTION, SOLUTION INTRAVENOUS CONTINUOUS
Status: DISCONTINUED | OUTPATIENT
Start: 2021-08-11 | End: 2021-08-11 | Stop reason: HOSPADM

## 2021-08-11 RX ORDER — SODIUM CHLORIDE 0.9 % (FLUSH) 0.9 %
5-40 SYRINGE (ML) INJECTION AS NEEDED
Status: DISCONTINUED | OUTPATIENT
Start: 2021-08-11 | End: 2021-08-11 | Stop reason: HOSPADM

## 2021-08-11 RX ORDER — PROPOFOL 10 MG/ML
INJECTION, EMULSION INTRAVENOUS AS NEEDED
Status: DISCONTINUED | OUTPATIENT
Start: 2021-08-11 | End: 2021-08-11 | Stop reason: HOSPADM

## 2021-08-11 RX ORDER — FENTANYL CITRATE 50 UG/ML
INJECTION, SOLUTION INTRAMUSCULAR; INTRAVENOUS AS NEEDED
Status: DISCONTINUED | OUTPATIENT
Start: 2021-08-11 | End: 2021-08-11 | Stop reason: HOSPADM

## 2021-08-11 RX ORDER — SODIUM CHLORIDE, SODIUM LACTATE, POTASSIUM CHLORIDE, CALCIUM CHLORIDE 600; 310; 30; 20 MG/100ML; MG/100ML; MG/100ML; MG/100ML
INJECTION, SOLUTION INTRAVENOUS
Status: DISCONTINUED | OUTPATIENT
Start: 2021-08-11 | End: 2021-08-11 | Stop reason: HOSPADM

## 2021-08-11 RX ORDER — LIDOCAINE HYDROCHLORIDE 20 MG/ML
JELLY TOPICAL AS NEEDED
Status: DISCONTINUED | OUTPATIENT
Start: 2021-08-11 | End: 2021-08-11 | Stop reason: HOSPADM

## 2021-08-11 RX ORDER — HYDROMORPHONE HYDROCHLORIDE 1 MG/ML
0.2 INJECTION, SOLUTION INTRAMUSCULAR; INTRAVENOUS; SUBCUTANEOUS
Status: DISCONTINUED | OUTPATIENT
Start: 2021-08-11 | End: 2021-08-11 | Stop reason: HOSPADM

## 2021-08-11 RX ORDER — ACETAMINOPHEN 325 MG/1
650 TABLET ORAL ONCE
Status: DISCONTINUED | OUTPATIENT
Start: 2021-08-11 | End: 2021-08-11 | Stop reason: HOSPADM

## 2021-08-11 RX ORDER — EPHEDRINE SULFATE/0.9% NACL/PF 50 MG/5 ML
5 SYRINGE (ML) INTRAVENOUS AS NEEDED
Status: DISCONTINUED | OUTPATIENT
Start: 2021-08-11 | End: 2021-08-11 | Stop reason: HOSPADM

## 2021-08-11 RX ORDER — SODIUM CHLORIDE 0.9 % (FLUSH) 0.9 %
5-40 SYRINGE (ML) INJECTION EVERY 8 HOURS
Status: DISCONTINUED | OUTPATIENT
Start: 2021-08-11 | End: 2021-08-11 | Stop reason: HOSPADM

## 2021-08-11 RX ORDER — FENTANYL CITRATE 50 UG/ML
50 INJECTION, SOLUTION INTRAMUSCULAR; INTRAVENOUS AS NEEDED
Status: DISCONTINUED | OUTPATIENT
Start: 2021-08-11 | End: 2021-08-11 | Stop reason: HOSPADM

## 2021-08-11 RX ORDER — ONDANSETRON 2 MG/ML
4 INJECTION INTRAMUSCULAR; INTRAVENOUS AS NEEDED
Status: DISCONTINUED | OUTPATIENT
Start: 2021-08-11 | End: 2021-08-11 | Stop reason: HOSPADM

## 2021-08-11 RX ORDER — ROCURONIUM BROMIDE 10 MG/ML
INJECTION, SOLUTION INTRAVENOUS AS NEEDED
Status: DISCONTINUED | OUTPATIENT
Start: 2021-08-11 | End: 2021-08-11 | Stop reason: HOSPADM

## 2021-08-11 RX ORDER — EPHEDRINE SULFATE/0.9% NACL/PF 50 MG/5 ML
SYRINGE (ML) INTRAVENOUS AS NEEDED
Status: DISCONTINUED | OUTPATIENT
Start: 2021-08-11 | End: 2021-08-11 | Stop reason: HOSPADM

## 2021-08-11 RX ORDER — LIDOCAINE HYDROCHLORIDE 20 MG/ML
INJECTION, SOLUTION EPIDURAL; INFILTRATION; INTRACAUDAL; PERINEURAL AS NEEDED
Status: DISCONTINUED | OUTPATIENT
Start: 2021-08-11 | End: 2021-08-11 | Stop reason: HOSPADM

## 2021-08-11 RX ORDER — MORPHINE SULFATE 2 MG/ML
2 INJECTION, SOLUTION INTRAMUSCULAR; INTRAVENOUS
Status: DISCONTINUED | OUTPATIENT
Start: 2021-08-11 | End: 2021-08-11 | Stop reason: HOSPADM

## 2021-08-11 RX ORDER — LIDOCAINE HYDROCHLORIDE 10 MG/ML
0.1 INJECTION, SOLUTION EPIDURAL; INFILTRATION; INTRACAUDAL; PERINEURAL AS NEEDED
Status: DISCONTINUED | OUTPATIENT
Start: 2021-08-11 | End: 2021-08-11 | Stop reason: HOSPADM

## 2021-08-11 RX ORDER — PHENYLEPHRINE HCL IN 0.9% NACL 0.4MG/10ML
SYRINGE (ML) INTRAVENOUS AS NEEDED
Status: DISCONTINUED | OUTPATIENT
Start: 2021-08-11 | End: 2021-08-11 | Stop reason: HOSPADM

## 2021-08-11 RX ORDER — MIDAZOLAM HYDROCHLORIDE 1 MG/ML
1 INJECTION, SOLUTION INTRAMUSCULAR; INTRAVENOUS AS NEEDED
Status: DISCONTINUED | OUTPATIENT
Start: 2021-08-11 | End: 2021-08-11 | Stop reason: HOSPADM

## 2021-08-11 RX ORDER — SODIUM CHLORIDE, SODIUM LACTATE, POTASSIUM CHLORIDE, CALCIUM CHLORIDE 600; 310; 30; 20 MG/100ML; MG/100ML; MG/100ML; MG/100ML
125 INJECTION, SOLUTION INTRAVENOUS CONTINUOUS
Status: DISCONTINUED | OUTPATIENT
Start: 2021-08-11 | End: 2021-08-11 | Stop reason: HOSPADM

## 2021-08-11 RX ORDER — FENTANYL CITRATE 50 UG/ML
25 INJECTION, SOLUTION INTRAMUSCULAR; INTRAVENOUS
Status: DISCONTINUED | OUTPATIENT
Start: 2021-08-11 | End: 2021-08-11 | Stop reason: HOSPADM

## 2021-08-11 RX ORDER — HALOPERIDOL 5 MG/ML
5 INJECTION INTRAMUSCULAR ONCE
Status: DISPENSED | OUTPATIENT
Start: 2021-08-11 | End: 2021-08-12

## 2021-08-11 RX ORDER — SODIUM CHLORIDE, SODIUM LACTATE, POTASSIUM CHLORIDE, CALCIUM CHLORIDE 600; 310; 30; 20 MG/100ML; MG/100ML; MG/100ML; MG/100ML
1000 INJECTION, SOLUTION INTRAVENOUS CONTINUOUS
Status: DISCONTINUED | OUTPATIENT
Start: 2021-08-11 | End: 2021-08-11 | Stop reason: HOSPADM

## 2021-08-11 RX ORDER — PHENYLEPHRINE HCL IN 0.9% NACL 0.4MG/10ML
SYRINGE (ML) INTRAVENOUS
Status: DISCONTINUED | OUTPATIENT
Start: 2021-08-11 | End: 2021-08-11 | Stop reason: HOSPADM

## 2021-08-11 RX ORDER — SUCCINYLCHOLINE CHLORIDE 20 MG/ML
INJECTION INTRAMUSCULAR; INTRAVENOUS AS NEEDED
Status: DISCONTINUED | OUTPATIENT
Start: 2021-08-11 | End: 2021-08-11 | Stop reason: HOSPADM

## 2021-08-11 RX ORDER — KETAMINE HYDROCHLORIDE 10 MG/ML
INJECTION, SOLUTION INTRAMUSCULAR; INTRAVENOUS AS NEEDED
Status: DISCONTINUED | OUTPATIENT
Start: 2021-08-11 | End: 2021-08-11 | Stop reason: HOSPADM

## 2021-08-11 RX ORDER — GLYCOPYRROLATE 0.2 MG/ML
INJECTION INTRAMUSCULAR; INTRAVENOUS AS NEEDED
Status: DISCONTINUED | OUTPATIENT
Start: 2021-08-11 | End: 2021-08-11 | Stop reason: HOSPADM

## 2021-08-11 RX ORDER — ONDANSETRON 2 MG/ML
INJECTION INTRAMUSCULAR; INTRAVENOUS AS NEEDED
Status: DISCONTINUED | OUTPATIENT
Start: 2021-08-11 | End: 2021-08-11 | Stop reason: HOSPADM

## 2021-08-11 RX ADMIN — PROPOFOL 20 MG: 10 INJECTION, EMULSION INTRAVENOUS at 13:08

## 2021-08-11 RX ADMIN — ROCURONIUM BROMIDE 10 MG: 10 SOLUTION INTRAVENOUS at 12:52

## 2021-08-11 RX ADMIN — Medication 10 MG: at 12:59

## 2021-08-11 RX ADMIN — DEXMEDETOMIDINE HYDROCHLORIDE 5 MCG: 100 INJECTION, SOLUTION, CONCENTRATE INTRAVENOUS at 14:21

## 2021-08-11 RX ADMIN — Medication 80 MCG: at 12:51

## 2021-08-11 RX ADMIN — DEXMEDETOMIDINE HYDROCHLORIDE 2 MCG: 100 INJECTION, SOLUTION, CONCENTRATE INTRAVENOUS at 13:08

## 2021-08-11 RX ADMIN — ONDANSETRON HYDROCHLORIDE 4 MG: 2 INJECTION, SOLUTION INTRAMUSCULAR; INTRAVENOUS at 12:52

## 2021-08-11 RX ADMIN — PROPOFOL 50 MG: 10 INJECTION, EMULSION INTRAVENOUS at 12:52

## 2021-08-11 RX ADMIN — DEXMEDETOMIDINE HYDROCHLORIDE 4 MCG: 100 INJECTION, SOLUTION, CONCENTRATE INTRAVENOUS at 12:52

## 2021-08-11 RX ADMIN — DEXTROSE AND SODIUM CHLORIDE 75 ML/HR: 5; 450 INJECTION, SOLUTION INTRAVENOUS at 21:15

## 2021-08-11 RX ADMIN — GENTAMICIN SULFATE 280 MG: 40 INJECTION, SOLUTION INTRAMUSCULAR; INTRAVENOUS at 13:00

## 2021-08-11 RX ADMIN — DEXMEDETOMIDINE HYDROCHLORIDE 4 MCG: 100 INJECTION, SOLUTION, CONCENTRATE INTRAVENOUS at 12:41

## 2021-08-11 RX ADMIN — GLYCOPYRROLATE 0.1 MG: 0.2 INJECTION, SOLUTION INTRAMUSCULAR; INTRAVENOUS at 12:41

## 2021-08-11 RX ADMIN — KETAMINE HYDROCHLORIDE 10 MG: 10 INJECTION, SOLUTION INTRAMUSCULAR; INTRAVENOUS at 13:08

## 2021-08-11 RX ADMIN — KETAMINE HYDROCHLORIDE 10 MG: 10 INJECTION, SOLUTION INTRAMUSCULAR; INTRAVENOUS at 12:52

## 2021-08-11 RX ADMIN — FENTANYL CITRATE 25 MCG: 50 INJECTION, SOLUTION INTRAMUSCULAR; INTRAVENOUS at 12:52

## 2021-08-11 RX ADMIN — Medication 15 MG: at 13:59

## 2021-08-11 RX ADMIN — SODIUM CHLORIDE, POTASSIUM CHLORIDE, SODIUM LACTATE AND CALCIUM CHLORIDE: 600; 310; 30; 20 INJECTION, SOLUTION INTRAVENOUS at 12:40

## 2021-08-11 RX ADMIN — Medication 10 ML: at 21:16

## 2021-08-11 RX ADMIN — WATER 1 G: 1 INJECTION INTRAMUSCULAR; INTRAVENOUS; SUBCUTANEOUS at 13:05

## 2021-08-11 RX ADMIN — Medication 10 MG: at 14:09

## 2021-08-11 RX ADMIN — HYDROMORPHONE HYDROCHLORIDE 0.2 MG: 1 INJECTION, SOLUTION INTRAMUSCULAR; INTRAVENOUS; SUBCUTANEOUS at 14:32

## 2021-08-11 RX ADMIN — Medication 5 MG: at 13:43

## 2021-08-11 RX ADMIN — DEXTROSE AND SODIUM CHLORIDE 75 ML/HR: 5; 450 INJECTION, SOLUTION INTRAVENOUS at 11:02

## 2021-08-11 RX ADMIN — Medication 80 MCG: at 12:58

## 2021-08-11 RX ADMIN — Medication 120 MCG: at 12:56

## 2021-08-11 RX ADMIN — Medication 80 MCG: at 12:52

## 2021-08-11 RX ADMIN — FAMOTIDINE 20 MG: 10 INJECTION, SOLUTION INTRAVENOUS at 09:29

## 2021-08-11 RX ADMIN — Medication 80 MCG: at 12:50

## 2021-08-11 RX ADMIN — Medication 5 MG: at 13:31

## 2021-08-11 RX ADMIN — Medication 40 MCG/MIN: at 12:56

## 2021-08-11 RX ADMIN — Medication 10 MG: at 14:03

## 2021-08-11 RX ADMIN — FAMOTIDINE 20 MG: 10 INJECTION, SOLUTION INTRAVENOUS at 21:15

## 2021-08-11 RX ADMIN — SUCCINYLCHOLINE CHLORIDE 100 MG: 20 INJECTION, SOLUTION INTRAMUSCULAR; INTRAVENOUS at 12:52

## 2021-08-11 RX ADMIN — DEXMEDETOMIDINE HYDROCHLORIDE 5 MCG: 100 INJECTION, SOLUTION, CONCENTRATE INTRAVENOUS at 14:20

## 2021-08-11 RX ADMIN — HYDROMORPHONE HYDROCHLORIDE 0.2 MG: 1 INJECTION, SOLUTION INTRAMUSCULAR; INTRAVENOUS; SUBCUTANEOUS at 14:47

## 2021-08-11 RX ADMIN — LIDOCAINE HYDROCHLORIDE 60 MG: 20 INJECTION, SOLUTION EPIDURAL; INFILTRATION; INTRACAUDAL; PERINEURAL at 12:52

## 2021-08-11 NOTE — PROGRESS NOTES
Occupational Therapy Note:     Pt seen numerous times today due to attempting to climb out of bed. Pt assisted with repositioning in bed but continued to keep sitting up in bed. Pt becoming increasingly agitated with therapist and not follow any commands to remain in bed. He was in FAVIAN wrist restraints and hyperextending FAVIAN shoulders and attempted to provide instruction he may injure his shoulders this way. Pt would not comply nor lay back in bed. Repositioned with HOB elevated to keep him sitting up as much as possible to protect shoulders. Nursing notified and in room with patient.          Darrell Lynn, OT

## 2021-08-11 NOTE — BRIEF OP NOTE
Brief Postoperative Note    Patient: Darci Kerr  YOB: 1933  MRN: [id number]    Date of Procedure: 8/11/2021     Pre-Op Diagnosis: BENIGN PROSTATIC HYPERPLASIA WITH OBSTRUCTION    Post-Op Diagnosis: Same as preoperative diagnosis. Procedure(s):  CYSTOSCOPY BIPOLAR TRANSURETHRAL RESECTION OF PROSTATE WITH ZARAGOZA PLACEMENT    Surgeon(s):  Danny Bhardwaj MD    Surgical Assistant: None    Anesthesia: General     Estimated Blood Loss (mL): 47BK    Complications: None    Specimens:   ID Type Source Tests Collected by Time Destination   1 : PROSTATE CHIPS Fresh Prostate  Danny Bhardwaj MD 8/11/2021 1352 Pathology        Implants: None    Drains: 20Fr 2-way coude catheter irrigating well with clear urine at conclusion of case. 10cc in balloon. 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 Normal anterior urethra. TURP defect from prior procedure with moderate regrowth of tissue albeit with a slightly open channel despite the regrowth and obstruction. Severe bladder trabeculations of posterior bladder wall and cellules.      Electronically Signed by Douglas Garcia MD on 8/11/2021 at 2:06 PM

## 2021-08-11 NOTE — OP NOTES
Preoperative Diagnosis: BPH with urinary retention    Postoperative Diagnosis:  Same    Procedure(s) Performed:    1. Cystourethroscopy  2. Transurethral resection of prostate  3. Simple Orta catheter placement    Surgeon:  Nelida Boland MD    Assistant(s):      Anesthesia:  General    Fluids:  See anesthesia record    Estimated blood loss:  <25 mL    Specimens:  Prostatic chips sent collectively for pathology    Cultures:  None    Implants: None    Tubes/Lines/Drains:  20Fr 2-way coude urethral Orta catheter    Complications:  None    Indications: 80 y.o. male with a past medical history of BPH s/p greenlight PVP 2014, Alzheimers dementia with recent deterioration in clinical functioning, urinary retention requiring chronic Orta catheter and Pascual. We have had thorough conversations regarding the utility of TURP with his family who wish to proceed with TURP with the hope of eventually being able to void at baseline without a catheter. Urodynamics and cystoscopy preoperatively were deferred after discussions based on his dementia and mental status. Preoperative urine culture most recently no growth. He presents today for cystoscopy with TURP. Risks & benefits of the procedure discussed with the patient's family & POAs, who wish to proceed. 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 Normal anterior urethra. TURP defect from prior procedure with moderate regrowth of tissue albeit with a slightly open channel despite the regrowth and obstruction. Severe bladder trabeculations of posterior bladder wall and cellules. Description:  The patient was correctly identified in the preop holding area where written informed consent as well as potential risks and complications were reviewed. His family & POA agreed. He was brought back to the operative suite. Once correct information was verified, general anesthesia was induced.  He was then gently placed into dorsal lithotomy position with SCDs in place for VTE prophylaxis. He was prepped and draped in the usual sterile fashion and given appropriate preoperative antibiotics with Ancef/Gentamicin. A surgical timeout was performed. We inserted a 21F rigid cystoscope per urethra with copious lubrication and normal saline irrigation running. This demonstrated findings as described above. No intravesical tumors, mucosal irregularities or stones were noted. No foreign bodies appreciated. Severe trabeculations with moderate regrowth of prostatic urethral tissue noted, albeit with what appeared to be an open channel despite the mild to moderate occlusion. There was not a major median lobe component to the prostate appreciated. We switched to a 26 Western Rebecca resectoscope with Olive Sheller working element and bipolar electrocautery loop. We proceeded to perform resection of the bilateral lateral lobes, paying careful attention to remain clear of his bilateral ureteral orifices, which were clearly identified. We resected to a depth of stroma just superficial to prostatic capsule, obtaining satisfactory hemostasis along the way using bipolar electrocautery. We paid careful attention to limit our resection distally to within the verumontanum to avoid involving the extrinsic sphincter within our resection. Prostatic tissue chips were evacuated using combination of Ellik & serial bladder filling/emptying and sent collectively for pathology analysis. Once we felt satisfied with our resection, we withdrew our scope to the verumontanum. Here we were able to appreciate a satisfactory urine channel with direct visualization of the posterior bladder wall. We again ensured strict hemostasis throughout our resected areas with our irrigation turned off. We rechecked the bilateral ureteral orifices which were uninvolved with our resection. Leaving the patient's bladder full, we removed all instrumentation. 2% viscous lidocaine urojet was instilled in the urethra.  We inserted a 20 Western Rebecca 2-way coude Orta catheter with 10 mL water in the balloon and placed this to drainage. He did well from a surgical and anesthetic perspective throughout the procedure. Bleeding was minimal. His urethral channel is patent and clearly open at the conclusion of the case. He was awoken from anesthesia and taken to recovery area for routine postoperative care. Post Op Plan:    -- Plan for Orta removal and void trial on morning of POD1  -- Return to floor for postoperative care  -- Check CBC/BMP tomorrow morning  -- Clear liquid diet, ADAT  -- If he were to fail ongoing void trials (x2) he would need chronic bladder outlet obstruction mgmt with either Orta or SPT. Intravesical findings today suggest ongoing chronic VALDEZ and detrusor function may not be adequate despite TURP. Will see how he does with VTs and hope that this helps obviate need for Orta.     Sandro Delgado MD  [facility]

## 2021-08-11 NOTE — PROGRESS NOTES
Palliative Medicine Consult  Tono: [phone]    Patient Name: Eldon Villela  YOB: 1933    Date of Initial Consult: 8/6/21  Reason for Consult: care decisions   Requesting Provider: Brannon Templeton   Primary Care Physician: Kylee, MD Anna     SUMMARY:   Eldon Villela \"Pops\" is a 80 y.o. with a past history of hypothyroidism, depression and dementia that started approximately 5 years ago, chronic rodriguez catheter for BPH (scheduled for TURP on 8/11/21 w/ Dr Sosa Hess at [facility], had TURP in 2014) who was admitted on 7/28/2021 from [facility] with aggressive behavior. Was seen at [facility] neuro clinic 3/2021 for cognitive changes that started 5 years ago, cannot do complex activities and requires around the clock care, incontinent w/ urine. Most recently at [facility] and was going to be tx to a psychiatric unit in [place] but required medical evaluation first. In ED was very agitated and required Ativan , Benadryl and Geodon. Had some improvement w/ time, medication management and treatment for UTI (Meropenem). Per family over past month pt has been in ED or hospital 6 times, declining, mult UTIs. Current medical issues leading to Palliative Medicine involvement include: care decisions. Social: Pt  to Ariel Cage" for 65 years. Had been living together in [facility] until moved to [facility] and since then has not been able to see her regularly as he is in the memory center. He worked at [employer] for 30+ years, loved math puzzles. Liked to watch sports, also enjoyed to robles sometimes after going to [place]. 2 children- Joesph Choe and Richard Serrato. Sons are mPOA but they do involve their mother/pt's wife in decisions. PALLIATIVE DIAGNOSES:   1. Severe dementia- was FAST 6D 3/2021  2. Delirium- UTI, malnutrition  3. Feeding difficulties   4. Goals of care        PLAN:   1. Pt about to go to OR for TURP.  Family aware that the goals will be for him to urinate w/out rodriguez and decr UTIs but that this does not change his severe dementia that is progressing over past couple months significantly. 2. Hospice was brought up earlier today and family is interested in pursuing this once patient finds a NH where he will go- explain general philosophy, although will benefit from hospice explaining in detail once the NH is decided upon. 3. Family was under the impression that pt had a Do Not attempt Resuscitation status based on his living will- not wanting heroic measures at end of life. However is a full code in the computer system. There was some confusion about this, as per family, the  that drafted the AMD told them that if pt becomes \"a DNR\" then sometimes medications and things like O2 are withheld. Explain the very specific circumstance (when someone dies) where the DNR comes into play and that this does not change how we care for him. 4. Decision for DNR status once out of OR- speak to Dina Maria C (who is talking to Xyleme on phone) and wife Fred Silvestre. 5. Communicated plan of care with: Palliative IDT, [facility] Team incl Deb Pinto RN, Dr Sharyle Batten, [facility] care management      GOALS OF CARE / TREATMENT PREFERENCES:     GOALS OF CARE:  Patient/Health Care Proxy Stated Goals:  Other (comment) (To see if pt's mental status can improve)    TREATMENT PREFERENCES:   Code Status: Full Code    Advance Care Planning:  [x] The [facility] Interdisciplinary Team has updated the ACP Navigator with Health Care Decision Maker and Patient Capacity      Primary Decision Maker: Ga Mcgraw - [phone]    Primary Decision Maker: Oli Berriosjosé [facility] - [phone]  Advance Care Planning 7/30/2021   Confirm Advance Directive Yes, not on file       Medical Interventions: Full interventions   Artificially Administered Nutrition: Feeding tube for a defined trial period     Other:    As far as possible, the palliative care team has discussed with patient / health care proxy about goals of care / treatment preferences for patient. HISTORY:     History obtained from: chart, staff, family     CHIEF COMPLAINT:  Cannot obtain due to patient factors      HPI/SUBJECTIVE:    The patient is:   [] Verbal and participatory  [x] Non-participatory due to: medical condition    Was talking to family and told them he loves them, lethargic for me. Clinical Pain Assessment (nonverbal scale for severity on nonverbal patients):   Clinical Pain Assessment  Severity: 0     Activity (Movement): Lying quietly, normal position    Duration: for how long has pt been experiencing pain (e.g., 2 days, 1 month, years)  Frequency: how often pain is an issue (e.g., several times per day, once every few days, constant)     FUNCTIONAL ASSESSMENT:     Palliative Performance Scale (PPS):  PPS: 20       PSYCHOSOCIAL/SPIRITUAL SCREENING:     Palliative IDT has assessed this patient for cultural preferences / practices and a referral made as appropriate to needs (Cultural Services, Patient Advocacy, Ethics, etc.)    Any spiritual / Methodist concerns:  [] Yes /  [x] No    Caregiver Burnout:  [] Yes /  [x] No /  [] No Caregiver Present      Anticipatory grief assessment:   [x] Normal  / [] Maladaptive       ESAS Anxiety:      ESAS Depression:       Cannot obtain due to patient factors     REVIEW OF SYSTEMS:     Positive and pertinent negative findings in ROS are noted above in HPI. The following systems were [x] reviewed / [] unable to be reviewed as noted in HPI  Other findings are noted below. Systems: constitutional, ears/nose/mouth/throat, respiratory, gastrointestinal, genitourinary, musculoskeletal, integumentary, neurologic, psychiatric, endocrine. Positive findings noted below.   Modified ESAS Completed by: provider   Fatigue: 8 Drowsiness: 8     Pain: 0         Anorexia: 10 Dyspnea: 0                    PHYSICAL EXAM:     From RN flowsheet:  Wt Readings from Last 3 Encounters:   07/31/21 123 lb 3.8 oz (55.9 kg) 07/26/21 134 lb 0.6 oz (60.8 kg)   06/17/21 148 lb 2.4 oz (67.2 kg)     Blood pressure (!) 140/69, pulse (!) 103, temperature 97.6 °F (36.4 °C), resp. rate 18, height 5' 7\" (1.702 m), weight 123 lb 3.8 oz (55.9 kg), SpO2 95 %. Pain Scale 1: Visual  Pain Intensity 1: 0                 Last bowel movement, if known:     Constitutional: eyes open, lethargic        HISTORY:     Active Problems:    Lactic acidosis (7/28/2021)      Past Medical History:   Diagnosis Date    Arthritis     Asthma     Benign prostate hyperplasia     Dementia ([facility] [address]     Hypothyroidism     Spinal stenosis of lumbar region with radiculopathy 6/26/2013      Past Surgical History:   Procedure Laterality Date    HX OTHER SURGICAL      cyst removed from back    HX TONSIL AND ADENOIDECTOMY        History reviewed. No pertinent family history. History reviewed, no pertinent family history. Social History     Tobacco Use    Smoking status: Never Smoker    Smokeless tobacco: Never Used   Substance Use Topics    Alcohol use: Yes     Comment: occ.      No Known Allergies   Current Facility-Administered Medications   Medication Dose Route Frequency    dextrose 5 % - 0.45% NaCl infusion  75 mL/hr IntraVENous CONTINUOUS    gentamicin (GARAMYCIN) 280 mg in 0.9% sodium chloride 100 mL IVPB  280 mg IntraVENous ON CALL TO OR    levothyroxine (SYNTHROID) tablet 62.5 mcg  62.5 mcg Oral ACB    haloperidol lactate (HALDOL) injection 1 mg  1 mg IntraVENous Q6H PRN    ceFAZolin (ANCEF) 2 g in sterile water (preservative free) 20 mL IV syringe  2 g IntraVENous ON CALL TO OR    famotidine (PF) (PEPCID) 20 mg in 0.9% sodium chloride 10 mL injection  20 mg IntraVENous Q12H    rivastigmine (EXELON) 9.5 mg/24 hour patch 1 Patch  1 Patch TransDERmal DAILY    sodium chloride (NS) flush 5-40 mL  5-40 mL IntraVENous PRN    acetaminophen (TYLENOL) tablet 650 mg  650 mg Oral Q6H PRN    Or    acetaminophen (TYLENOL) suppository 650 mg  650 mg Rectal Q6H PRN    enoxaparin (LOVENOX) injection 40 mg  40 mg SubCUTAneous DAILY    bisacodyL (DULCOLAX) suppository 10 mg  10 mg Rectal DAILY PRN    finasteride (PROSCAR) tablet 5 mg  5 mg Oral DAILY    sertraline (ZOLOFT) tablet 25 mg  25 mg Oral DAILY    tamsulosin (FLOMAX) capsule 0.4 mg  0.4 mg Oral DAILY          LAB AND IMAGING FINDINGS:     Lab Results   Component Value Date/Time    WBC 7.9 08/07/2021 03:27 AM    HGB 13.2 08/07/2021 03:27 AM    PLATELET 944 69/17/0520 03:27 AM     Lab Results   Component Value Date/Time    Sodium 136 08/10/2021 02:25 AM    Potassium 4.6 08/10/2021 02:25 AM    Chloride 102 08/10/2021 02:25 AM    CO2 25 08/10/2021 02:25 AM    BUN 18 08/10/2021 02:25 AM    Creatinine 0.73 08/10/2021 02:25 AM    Calcium 8.7 08/10/2021 02:25 AM    Magnesium 1.8 08/10/2021 02:25 AM      Lab Results   Component Value Date/Time    Alk. phosphatase 89 07/28/2021 02:59 PM    Protein, total 7.8 07/28/2021 02:59 PM    Albumin 4.4 07/28/2021 02:59 PM    Globulin 3.4 07/28/2021 02:59 PM     Lab Results   Component Value Date/Time    INR 1.1 08/04/2021 07:14 PM    Prothrombin time 11.1 08/04/2021 07:14 PM    aPTT 29.0 08/04/2021 07:14 PM    aPTT 25.2 06/14/2013 11:50 AM      No results found for: IRON, FE, TIBC, IBCT, PSAT, FERR   No results found for: PH, PCO2, PO2  No components found for: GLPOC   No results found for: CPK, CKMB             Total time: 45 min   Counseling / coordination time, spent as noted above: 35 min   > 50% counseling / coordination?: yes    Prolonged service was provided for  []30 min   []75 min in face to face time in the presence of the patient, spent as noted above. Time Start:   Time End:   Note: this can only be billed with 41102 (initial) or 57303 (follow up). If multiple start / stop times, list each separately.

## 2021-08-11 NOTE — PROGRESS NOTES
Bedside and Verbal shift change report given to Carloz Jonas (oncoming nurse) by Romario Olivera RN (offgoing nurse). Report included the following information SBAR.

## 2021-08-11 NOTE — PROGRESS NOTES
[address] [facility] Adult  Hospitalist Group                                                                                          Hospitalist Progress Note  Radha Monteiro MD  Answering service: [phone] from in house phone      NAME:  Margret Cowart  :  3/23/1933  MRN:  [id number]      Admission Summary: \"The patient is an 20-year-old male with longstanding history of dementia of Alzheimer's type. The patient is in a Memory Unit because of his agitation and psych issues, the patient was being transferred to the Psychiatry unit at [place]. In [place], the facility had wanted a physical and routine labs to be done at a facility before accepting the patient. The patient was evaluated in the ER. The patient upon arrival to the [facility] ER was extremely combative and agitated, received Geodon and some Ativan after which he became less combative and manageable. The patient is unable to give any history in the ER. Further questioning from the family members, it was noted that he is scheduled to undergo some TURP and has had intermittent urinary retention for which he has chronic Orta. The patient has a history of chronic urinary tract infections in the past.  The urine that was obtained was extremely dark colored for which he received a dose of vancomycin. The labs showed significantly elevated lactic acidosis of 4.5 for which IV fluids were administered. The patient's lactic acid came down to 1.5 after that. Currently, he is resting comfortably, responds to the verbal and tactile stimuli. No signs of agitation were noted.   No further history can be obtained because of the memory\"    Interval history / Subjective:   F/u for dementia with behavioral disturbance  Awake, alert, A&Ox1  Seen this AM before TURP       Assessment & Plan:     dementia with behavioral disturbance likely delirium d/t UTI  Chronic catheter associated UTI  S/p meropenem    Lactic acidosis, resolved. Dementia of Alzheimer's type  cont exelon patch  haldol IM as needed    History of benign prostatic hypertrophy and chronic rodriguez  continue with the Flomax and Proscar   Urology consulted- TURP done by the time this note written, plan for rodriguez removal In AM for Voiding trial X 2, if fails he will need chronic rodriguez for VALDEZ     Hypothyroidism, on thyroid replacement  TSH elevated, increase LT4 from 50 to 62. 5mcg    Hypokalemia, repleted    Dysphagia and malnutrition due to dementia and poor PO intake  Encourage PO as able  Did not tolerate ngt  palliative care following    Code status: Full Code  DVT prophylaxis: Lovenox    Care Plan discussed with: patient  Anticipated Disposition: tbd  Anticipated Discharge: tbd    Discussed care with son at bedside and also wife. Discussed patient is very appropriate for hospice. Discussed fixing urological issue may not fix his dementia. Family to discuss hospice on discharge at this point to allow some quality of life. Palliative team following . Hospital Problems  Date Reviewed: 8/11/2021        Codes Class Noted POA    Lactic acidosis ICD-10-CM: E87.2  ICD-9-CM: 276.2  7/28/2021 Unknown                Review of Systems:   Unable to obtain due to current state    Vital Signs:    Last 24hrs VS reviewed since prior progress note.  Most recent are:  Visit Vitals  BP (!) 100/59   Pulse 93   Temp 97.8 °F (36.6 °C)   Resp 11   Ht 5' 7\" (1.702 m)   Wt 55.9 kg (123 lb 3.8 oz)   SpO2 97%   BMI 19.30 kg/m²     Patient Vitals for the past 24 hrs:   Temp Pulse Resp BP SpO2   08/11/21 1435  93 11 (!) 100/59 97 %   08/11/21 1430  (!) 116 25 (!) 103/92    08/11/21 1425  (!) 119 22 (!) 114/99    08/11/21 1420 97.8 °F (36.6 °C) 96 25 110/65 100 %   08/11/21 1214  80 16 110/76 99 %   08/11/21 0915 97.6 °F (36.4 °C) (!) 103 18 (!) 140/69 95 %   08/11/21 0200 97.9 °F (36.6 °C) (!) 101 18 118/64 96 %   08/10/21 2013 97.8 °F (36.6 °C) 87 18 (!) 102/58 98 % Intake/Output Summary (Last 24 hours) at 8/11/2021 1449  Last data filed at 8/11/2021 1345  Gross per 24 hour   Intake 610 ml   Output 275 ml   Net 335 ml        Physical Examination:        Constitutional:  nad   Resp:  No accessory muscle use, no obvious wheezing or rales   CV:  Regular rhythm, normal rate    GI:  soft, no grimacing with palpation  : chronic rodriguez    Musculoskeletal:  generalized muscle atrophy, no cyanosis, MUNOZ    Neurologic:  Moves all extremities, only oriented to self (name)  Psych: baseline dementia, not agitated            Data Review:     Labs:     No results for input(s): WBC, HGB, HCT, PLT, HGBEXT, HCTEXT, PLTEXT, HGBEXT, HCTEXT, PLTEXT in the last 72 hours. Recent Labs     08/10/21  0225 08/09/21  0550    138   K 4.6 3.2*    103   CO2 25 30   BUN 18 16   CREA 0.73 0.59*   * 121*   CA 8.7 8.7   MG 1.8  --        No results for input(s): INR, PTP, APTT, INREXT, INREXT in the last 72 hours.        Medications Reviewed:     ______________________________________________________________________  EXPECTED LENGTH OF STAY: - - -  ACTUAL LENGTH OF STAY:          [address] [facility], [place]

## 2021-08-11 NOTE — ANESTHESIA POSTPROCEDURE EVALUATION
Procedure(s):  CYSTOSCOPY BIPOLAR TRANSURETHRAL RESECTION OF PROSTATE WITH ZARAGOZA PLACEMENT. general    Anesthesia Post Evaluation      Multimodal analgesia: multimodal analgesia used between 6 hours prior to anesthesia start to PACU discharge  Patient location during evaluation: bedside  Patient participation: complete - patient cannot participate  Level of consciousness: awake  Pain score: 0  Pain management: adequate  Airway patency: patent  Anesthetic complications: no  Cardiovascular status: acceptable and blood pressure returned to baseline  Respiratory status: acceptable  Hydration status: acceptable  Comments: I have evaluated the patient and meets criteria for discharge from PACU. Aubree Harp DO. Post anesthesia nausea and vomiting:  none  Final Post Anesthesia Temperature Assessment:  Normothermia (36.0-37.5 degrees C)      INITIAL Post-op Vital signs:   Vitals Value Taken Time   /79 08/11/21 1500   Temp 36.6 °C (97.8 °F) 08/11/21 1420   Pulse 84 08/11/21 1505   Resp 9 08/11/21 1505   SpO2 97 % 08/11/21 1505   Vitals shown include unvalidated device data.

## 2021-08-11 NOTE — INTERVAL H&P NOTE
Update History & Physical    The Patient's History and Physical of was reviewed with the patient and I examined the patient. There was a change in that we are planning to proceed with bilateral TURP at [facility], instead of [place]. The surgical site was confirmed by the patient and me. Plan:  The risk, benefits, expected outcome, and alternative to the recommended procedure have been discussed with the patient. Patient understands and wants to proceed with the procedure.     Electronically signed by Perri Aly MD on 8/11/2021 at 12:29 PM

## 2021-08-11 NOTE — PROGRESS NOTES
Occupational Therapy Note:     Pt to have surgery today for TURP. Pt continues with restraints at this time and continues with no command following. Pt does not engage with ADL activities, refuses feeding when offered, refuses to complete any grooming activities. Pt remains total care for all basic ADL activities. He does present to be calmer when his family is present. Due to continued cognitive decline and inability to progress with ADL performance, benefit from education, will complete OT orders at this time. Anticipate he will return home with 24/7 care.         Farshad Han, OT

## 2021-08-11 NOTE — ROUTINE PROCESS
Patient: Yanna Betancourt MRN: [id number]  SSN: [id number]   YOB: 1933  Age: 80 y.o. Sex: male     Patient is status post Procedure(s):  CYSTOSCOPY BIPOLAR TRANSURETHRAL RESECTION OF PROSTATE WITH ZARAGOZA PLACEMENT.     Surgeon(s) and Role:     * Tejas Alvarez MD - Primary                      Peripheral IV 07/28/21 Left Arm (Active)   Site Assessment Clean, dry, & intact 08/11/21 0915   Phlebitis Assessment 0 08/11/21 0915   Infiltration Assessment 0 08/11/21 0915   Dressing Status Clean, dry, & intact 08/11/21 0915   Dressing Type Transparent 08/11/21 0915   Hub Color/Line Status Pink 08/11/21 0915   Action Taken Open ports on tubing capped 08/11/21 0915   Alcohol Cap Used Yes 08/11/21 0915       Peripheral IV 08/11/21 Left Hand (Active)

## 2021-08-11 NOTE — WOUND CARE
Wound Care Note:     New consult placed by nurse request for excoriated buttocks and mild red sacrum    Chart shows:  Admitted for lactic acidosis  Past Medical History:   Diagnosis Date    Arthritis     Asthma     Benign prostate hyperplasia     Dementia ([facility] [address]     Hypothyroidism     Spinal stenosis of lumbar region with radiculopathy 6/26/2013     WBC = 7.9 on 8/7/21    Assessment:   Patient is alert but confused, wrist restraints on to prevent pulling on lines, incontinent with moderateassistance needed in repositioning. Bed: Total Care  Patient has a Orta. Diet: Adult diet   Patient reports no pain. Bilateral heels skin intact and without erythema. 1. Patient with excoriation to bilateral buttocks and trochanters from scratching. Hydroguard applied. Spoke with Dr. Eliza Daniel, wound care orders obtained. Patient trying to get out of bed, very agitated, Haldol given already; unable to reposition at this time. Recommendations:    Buttocks and trochanters- Every 12 hours apply Hydroguard (blue tube). Skin Care & Pressure Prevention:  Minimize layers of linen/pads under patient to optimize support surface. Turn/reposition approximately every 2 hours and offload heels.   Manage incontinence / promote continence   Nourishing Skin Cream to dry skin, minimize use of briefs when able    Discussed above plan with patient & Danica De Dios RN    Transition of Care: Plan to follow as needed while admitted to hospital.    RAYSHAWN EvansN, RN, [facility], [facility].  office [phone]  pager [phone] or call  to page

## 2021-08-11 NOTE — ANESTHESIA PREPROCEDURE EVALUATION
Relevant Problems   No relevant active problems       Anesthetic History   No history of anesthetic complications            Review of Systems / Medical History  Patient summary reviewed, nursing notes reviewed and pertinent labs reviewed    Pulmonary            Asthma : well controlled       Neuro/Psych         Dementia     Cardiovascular  Within defined limits                     GI/Hepatic/Renal  Within defined limits              Endo/Other      Hypothyroidism  Arthritis     Other Findings              Physical Exam    Airway  Mallampati: IV  TM Distance: > 6 cm  Neck ROM: normal range of motion   Mouth opening: Normal     Cardiovascular    Rhythm: regular  Rate: normal         Dental  No notable dental hx       Pulmonary  Breath sounds clear to auscultation               Abdominal  GI exam deferred       Other Findings            Anesthetic Plan    ASA: 3  Anesthesia type: general          Induction: Intravenous  Anesthetic plan and risks discussed with: Patient, Son / Daughter and Spouse      Son (Ariana Tom) is POA and at bedside. Discussed anesthesia plan with him and he agreed to proceed.

## 2021-08-11 NOTE — ROUTINE PROCESS
TRANSFER - IN REPORT:    Verbal report received from 6 [facility] RN(name) on [employer]  being received from 563(unit) for ordered procedure      Report consisted of patients Situation, Background, Assessment and   Recommendations(SBAR). Information from the following report(s) SBAR was reviewed with the receiving nurse. Opportunity for questions and clarification was provided. Assessment completed upon patients arrival to unit and care assumed.

## 2021-08-11 NOTE — PROGRESS NOTES
5536:  Per MD hold patient's lovenox this morning. 1547:  RN notified MD that patient's MEWs score is a 3 and that just returned to the floor from PACU. RN notified MD patient is very agitated and his HR is 130. Per MD place order for one time dose of 5 mg of haldol IV.  1555:  RN went to administer haldol to patient but patient was resting quietly at this time and HR was now 92.  2005:  Bedside shift change report given to Kevin Nice RN (oncoming nurse) by Leah Donaldson RN (offgoing nurse). Report included the following information SBAR, Kardex, Procedure Summary, Intake/Output, MAR and Recent Results. Primary Nurse Marcelina Moe and Kevin Nice RN, RN performed a dual skin assessment on this patient. Impairment noted- see wound doc flow sheet. Excoriation/redness to bilateral buttocks and sacrum. Scattered scabs and bruises present to BLE. Scab present to L elbow. Delfino score is 11.

## 2021-08-12 LAB
ANION GAP SERPL CALC-SCNC: 9 MMOL/L (ref 5–15)
BASOPHILS # BLD: 0 K/UL (ref 0–0.1)
BASOPHILS NFR BLD: 0 % (ref 0–1)
BUN SERPL-MCNC: 20 MG/DL (ref 6–20)
BUN/CREAT SERPL: 29 (ref 12–20)
CALCIUM SERPL-MCNC: 8.8 MG/DL (ref 8.5–10.1)
CHLORIDE SERPL-SCNC: 106 MMOL/L (ref 97–108)
CO2 SERPL-SCNC: 22 MMOL/L (ref 21–32)
CREAT SERPL-MCNC: 0.7 MG/DL (ref 0.7–1.3)
DIFFERENTIAL METHOD BLD: ABNORMAL
EOSINOPHIL # BLD: 0.1 K/UL (ref 0–0.4)
EOSINOPHIL NFR BLD: 1 % (ref 0–7)
ERYTHROCYTE [DISTWIDTH] IN BLOOD BY AUTOMATED COUNT: 13.9 % (ref 11.5–14.5)
GLUCOSE SERPL-MCNC: 119 MG/DL (ref 65–100)
HCT VFR BLD AUTO: 33.7 % (ref 36.6–50.3)
HGB BLD-MCNC: 11.8 G/DL (ref 12.1–17)
IMM GRANULOCYTES # BLD AUTO: 0 K/UL (ref 0–0.04)
IMM GRANULOCYTES NFR BLD AUTO: 0 % (ref 0–0.5)
LYMPHOCYTES # BLD: 1.1 K/UL (ref 0.8–3.5)
LYMPHOCYTES NFR BLD: 13 % (ref 12–49)
MCH RBC QN AUTO: 30.9 PG (ref 26–34)
MCHC RBC AUTO-ENTMCNC: 35 G/DL (ref 30–36.5)
MCV RBC AUTO: 88.2 FL (ref 80–99)
MONOCYTES # BLD: 0.3 K/UL (ref 0–1)
MONOCYTES NFR BLD: 3 % (ref 5–13)
NEUTS SEG # BLD: 7.3 K/UL (ref 1.8–8)
NEUTS SEG NFR BLD: 83 % (ref 32–75)
NRBC # BLD: 0 K/UL (ref 0–0.01)
NRBC BLD-RTO: 0 PER 100 WBC
PLATELET # BLD AUTO: 293 K/UL (ref 150–400)
PMV BLD AUTO: 9.6 FL (ref 8.9–12.9)
POTASSIUM SERPL-SCNC: 4.2 MMOL/L (ref 3.5–5.1)
RBC # BLD AUTO: 3.82 M/UL (ref 4.1–5.7)
SODIUM SERPL-SCNC: 137 MMOL/L (ref 136–145)
WBC # BLD AUTO: 8.8 K/UL (ref 4.1–11.1)

## 2021-08-12 PROCEDURE — 74011250636 HC RX REV CODE- 250/636: Performed by: UROLOGY

## 2021-08-12 PROCEDURE — 65270000029 HC RM PRIVATE

## 2021-08-12 PROCEDURE — 85025 COMPLETE CBC W/AUTO DIFF WBC: CPT

## 2021-08-12 PROCEDURE — 80048 BASIC METABOLIC PNL TOTAL CA: CPT

## 2021-08-12 PROCEDURE — 36415 COLL VENOUS BLD VENIPUNCTURE: CPT

## 2021-08-12 PROCEDURE — 74011250637 HC RX REV CODE- 250/637: Performed by: UROLOGY

## 2021-08-12 PROCEDURE — 74011000250 HC RX REV CODE- 250: Performed by: UROLOGY

## 2021-08-12 PROCEDURE — 97161 PT EVAL LOW COMPLEX 20 MIN: CPT

## 2021-08-12 PROCEDURE — 51798 US URINE CAPACITY MEASURE: CPT

## 2021-08-12 PROCEDURE — 97530 THERAPEUTIC ACTIVITIES: CPT

## 2021-08-12 PROCEDURE — 74011250636 HC RX REV CODE- 250/636: Performed by: HOSPITALIST

## 2021-08-12 RX ORDER — HALOPERIDOL 1 MG/1
1 TABLET ORAL 2 TIMES DAILY
Status: DISCONTINUED | OUTPATIENT
Start: 2021-08-12 | End: 2021-08-13

## 2021-08-12 RX ORDER — HALOPERIDOL 5 MG/ML
4 INJECTION INTRAMUSCULAR ONCE
Status: COMPLETED | OUTPATIENT
Start: 2021-08-12 | End: 2021-08-12

## 2021-08-12 RX ORDER — HALOPERIDOL 5 MG/ML
4 INJECTION INTRAMUSCULAR ONCE
Status: ACTIVE | OUTPATIENT
Start: 2021-08-12 | End: 2021-08-13

## 2021-08-12 RX ADMIN — HALOPERIDOL LACTATE 4 MG: 5 INJECTION, SOLUTION INTRAMUSCULAR at 17:02

## 2021-08-12 RX ADMIN — LEVOTHYROXINE SODIUM 62.5 MCG: 0.12 TABLET ORAL at 07:14

## 2021-08-12 RX ADMIN — FAMOTIDINE 20 MG: 10 INJECTION, SOLUTION INTRAVENOUS at 20:56

## 2021-08-12 RX ADMIN — DEXTROSE AND SODIUM CHLORIDE 75 ML/HR: 5; 450 INJECTION, SOLUTION INTRAVENOUS at 10:23

## 2021-08-12 RX ADMIN — HALOPERIDOL LACTATE 1 MG: 5 INJECTION, SOLUTION INTRAMUSCULAR at 07:20

## 2021-08-12 RX ADMIN — FAMOTIDINE 20 MG: 10 INJECTION, SOLUTION INTRAVENOUS at 10:09

## 2021-08-12 RX ADMIN — HALOPERIDOL LACTATE 1 MG: 5 INJECTION, SOLUTION INTRAMUSCULAR at 00:28

## 2021-08-12 RX ADMIN — HALOPERIDOL LACTATE 1 MG: 5 INJECTION, SOLUTION INTRAMUSCULAR at 14:20

## 2021-08-12 RX ADMIN — ENOXAPARIN SODIUM 40 MG: 40 INJECTION SUBCUTANEOUS at 10:17

## 2021-08-12 NOTE — PROGRESS NOTES
[facility]                                                                                          Hospitalist Progress Note  Jaime Lynne MD  Answering service: [phone] from in house phone      NAME:  Fernando Obrien  :  3/23/1933  MRN:  [id number]      Admission Summary: \"The patient is an 66-year-old male with longstanding history of dementia of Alzheimer's type. The patient is in a Memory Unit because of his agitation and psych issues, the patient was being transferred to the Psychiatry unit at [place]. In [place], the facility had wanted a physical and routine labs to be done at a facility before accepting the patient. The patient was evaluated in the ER. The patient upon arrival to the [facility] ER was extremely combative and agitated, received Geodon and some Ativan after which he became less combative and manageable. The patient is unable to give any history in the ER. Further questioning from the family members, it was noted that he is scheduled to undergo some TURP and has had intermittent urinary retention for which he has chronic Orta. The patient has a history of chronic urinary tract infections in the past.  The urine that was obtained was extremely dark colored for which he received a dose of vancomycin. The labs showed significantly elevated lactic acidosis of 4.5 for which IV fluids were administered. The patient's lactic acid came down to 1.5 after that. Currently, he is resting comfortably, responds to the verbal and tactile stimuli. No signs of agitation were noted.   No further history can be obtained because of the memory\"    Interval history / Subjective:   F/u for dementia with behavioral disturbance  S/p TURP on , tolerate well  This AM , patient is calm , out of restraints  Ate breakfast with son's assistance        Assessment & Plan:     dementia with behavioral disturbance likely delirium d/t UTI  Chronic catheter associated UTI  S/p meropenem    Lactic acidosis, resolved. Dementia of Alzheimer's type  cont exelon patch  haldol IM as needed    History of benign prostatic hypertrophy and chronic rodriguez  continue with the Flomax and Proscar   Urology consulted, s/p TURP on 8/11  Rodriguez removed 8/12 for Voiding trial, if fails, replace rodriguez and see urology in 1  Week     Hypothyroidism, on thyroid replacement  TSH elevated, increase LT4 from 50 to 62. 5mcg    Hypokalemia, repleted    Dysphagia and malnutrition due to dementia and poor PO intake  Encourage PO as able  Did not tolerate ngt  palliative care following    Code status: Full Code  DVT prophylaxis: Lovenox    Care Plan discussed with: patient  Anticipated Disposition: tbd  Anticipated Discharge: tbd    Discussed care with patient's son at bedside,they discussed hospice. Will arrange and anticipate discharge in 48 hours. Hospital Problems  Date Reviewed: 8/11/2021        Codes Class Noted POA    Lactic acidosis ICD-10-CM: E87.2  ICD-9-CM: 276.2  7/28/2021 Unknown                Review of Systems:   Unable to obtain due to current state    Vital Signs:    Last 24hrs VS reviewed since prior progress note.  Most recent are:  Visit Vitals  /76 (BP 1 Location: Right upper arm)   Pulse (!) 105   Temp 98.8 °F (37.1 °C)   Resp 18   Ht 5' 7\" (1.702 m)   Wt 55.9 kg (123 lb 3.8 oz)   SpO2 96%   BMI 19.30 kg/m²     Patient Vitals for the past 24 hrs:   Temp Pulse Resp BP SpO2   08/12/21 1540 98.8 °F (37.1 °C) (!) 105 18 134/76 96 %   08/12/21 0725 98 °F (36.7 °C) (!) 101 17 130/73 96 %   08/12/21 0342 97.5 °F (36.4 °C) (!) 107 15 (!) 144/66 95 %   08/11/21 2015 97.5 °F (36.4 °C) 87 16 115/66 98 %   08/11/21 1829 97.5 °F (36.4 °C) 80 14 120/69 99 %       Intake/Output Summary (Last 24 hours) at 8/12/2021 1725  Last data filed at 8/12/2021 0741  Gross per 24 hour   Intake    Output 700 ml   Net -700 ml        Physical Examination: Constitutional:  nad   Resp:  No accessory muscle use, no obvious wheezing or rales   CV:  Regular rhythm, normal rate    GI:  soft, no grimacing with palpation  : chronic rodriguez    Musculoskeletal:  generalized muscle atrophy, no cyanosis, MUNOZ    Neurologic:  Moves all extremities, only oriented to self (name)  Psych: baseline dementia, not agitated            Data Review:     Labs:     Recent Labs     08/12/21  0320   WBC 8.8   HGB 11.8*   HCT 33.7*        Recent Labs     08/12/21  0320 08/10/21  0225    136   K 4.2 4.6    102   CO2 22 25   BUN 20 18   CREA 0.70 0.73   * 107*   CA 8.8 8.7   MG  --  1.8       No results for input(s): INR, PTP, APTT, INREXT, INREXT in the last 72 hours.        Medications Reviewed:     ______________________________________________________________________  EXPECTED LENGTH OF STAY: - - -  ACTUAL LENGTH OF STAY:          CtraNorah Nagy MD

## 2021-08-12 NOTE — PROGRESS NOTES
Urology:    Dr. Douglas Hurst note reviewed this AM  Per RN, rodriguez removed at 0730.   12:45 patient has not voided, bladder scan at 12:45 with 61cc. MD made aware    PLAN:  Recommend q2h bladder scan until patient voids, OR if patient still does not void with a bladder scan >450cc, recommend rodriguez placement. Please document findings in nursing note.     Case discussed with Dr. Orson Cabot

## 2021-08-12 NOTE — PROGRESS NOTES
Comprehensive Nutrition Assessment    Type and Reason for Visit: Follow-up; Re-assessment    Nutrition Recommendations/Plan:    1. RD downgrade diet texture to SB6; allow vanilla ice cream.  2. RD cancel Ensure Clear (apple) dislikes. Add Ensure Enlive van BID + Boost pudding van once/day  3. Staff/family feed and encourage PO intake, especially fluids. 4. No plan for EN now; Noted DNR code status. 5. Son would like to know current wt, obtain on bed scale. Last wt ~123# (7/31/21)    Nutrition Assessment:    Past Medical History:   Diagnosis Date    Arthritis     Asthma     Benign prostate hyperplasia     Dementia ([facility] [address]     Hypothyroidism     Spinal stenosis of lumbar region with radiculopathy 6/26/2013     Pt with longstanding hx Alzheimer's, resides in a memory care facility [facility]; Current placement pending. Chronic UTIs with indwelling Orta cath. Admitted [facility] ED for labs, combative/agitated. Pt found to have elevated lactic acidosis and dehydration, admitted and started on IVFs. Per MD, lactic acid resolved. Dextrose 5% @ 75 mL/hr continues to provide ~306 kcals/day. Admit BMI 19, underweight 52.1 kg (~114#)  bedscale, subsequent bedscale wt 55.9 kg (~123#). Comparing 114# to last recorded wt 148# (6/17/21) reflects significant and unplanned net wt loss 34# (23%) x ~ 60 days. Son at bedside claims, \"Wt had gotten down to 110# a few years ago and started him on Ensure then went to high of 140#; claims recently in 120-130# (BMI 19-20 range-underweight), but wasn't eating PTA\". Per son, \"wants to increase his dad's PO so we can get him off of the IVs\". Family brining in OTC \"Lean Body\" ONS and milkshakes. Son requests diet texture downgrade. No hx SLP eval for swallow, may be at risk? With Alzheimer's. RD discussed IDDSI texture options and would like to start with SB6 and downgrade further PRN.   Son asking for pancakes, eggs, egg salad and applesauce- favorite foods that are easy for him to chew. Natural teeth intact; Feeder. RD added ONS on 7/30/21, but pt with poor intake. Son will continue to bring in OTC ONS, \"Lean Body\" in 16 Oz. Container (280 kcal and 40 gm pro), which son drinks himself at home. Eats better with family assist, but still generally poor intake. K+ 4.2 WNL (8/12/21);  BUN/Creat 29 with continued poor PO intake and restraints. Unable to request or obtain fluids PO independently. Was dehydrated on admit. ? Last BM. PRN Dulcolax suppository, none given. Previous RD consult for EN. Code now DNR and no plan for EN? Luciana Grumbles Pt previously pulled out NG tube. Palliative noted     . No hx DM. IV D5% @ 75 mL/hr. Average poor PO intake. Malnutrition Assessment:  Malnutrition Status: Moderate malnutrition    Context:  Acute illness     Findings of the 6 clinical characteristics of malnutrition:   Energy Intake:  7 - 50% or less of est energy requirements for 5 or more days  Weight Loss:  7.00 - Greater than 7.5% over 3 months     Body Fat Loss:  1 - Mild body fat loss, Triceps, Buccal region   Muscle Mass Loss:  1 - Mild muscle mass loss, Clavicles (pectoralis & deltoids), Temples (temporalis)  Fluid Accumulation:  No significant fluid accumulation,     Strength:  Not performed     Nutritionally Significant Medications: Pepcid given, Synthroid (refused). Estimated Daily Nutrient Needs:  Energy (kcal): 1900 kcal ; Weight Used for Energy Requirements: Admission  Protein (g): 70 gm (~1.2 gm/kg); Weight Used for Protein Requirements: Current  Fluid (ml/day): 1900 mL; Method Used for Fluid Requirements: 1 ml/kcal    Nutrition Related Findings:  PO diet; no longer EN plan.    BM: Abd WDL  Edema: None noted  Wounds:  None     Recent Labs     08/12/21  0320 08/10/21  0225   * 107*   BUN 20 18   CREA 0.70 0.73    136   K 4.2 4.6    102   CO2 22 25   CA 8.8 8.7   MG  --  1.8       Recent Labs     08/12/21  0320   WBC 8.8   HGB 11.8*   HCT 33.7*          Current Nutrition Therapies:   Diet: Regular  Supplements: Ensure Clear apple discontinued; Ensure Enlive van B+L; Boost pud [employer]; Family brings ONS (\"Lean Body\")  from home   Additional Caloric Sources: D5 @ 50 mL/hr = 204 kcal    Meal intake:   Patient Vitals for the past 168 hrs:   % Diet Eaten   08/11/21 1700 1 - 25%   08/11/21 0915 0%   08/07/21 1343 26 - 50%     Supplement Intake: Family has been throwing the Ensure Clear out. RD stopped today and replaced with Ensure Enlive + Boost pudding-both vanilla. Anthropometric Measures:  · Height:  5' 7\" (170.2 cm)  · Current Body Wt:  55.9 kg (123 lb 3.8 oz) (7/31/21)   · Admission Body Wt:  114 lb 13.8 oz    · Ideal Body Wt:  148:  83.3 %   · BMI Categories:  Underweight (BMI less than 22) age over 72     Wt Readings from Last 10 Encounters:   07/31/21 55.9 kg (123 lb 3.8 oz)   07/26/21 60.8 kg (134 lb 0.6 oz)   06/17/21 67.2 kg (148 lb 2.4 oz)   04/24/21 67.2 kg (148 lb 2.4 oz)   01/07/21 66.9 kg (147 lb 7.8 oz)   06/27/13 63.7 kg (140 lb 7 oz)   06/12/13 64 kg (141 lb)       Nutrition Diagnosis:   · Unintended weight loss related to cognitive or neurological impairment, inadequate protein-energy intake as evidenced by intake 0-25%, poor intake prior to admission, BMI, weight loss greater than or equal to 5% in 1 month, moderate loss of subcutaneous fat, moderate muscle loss      Nutrition Interventions:   Food and/or Nutrient Delivery: Modify current diet; Modify current ONS  Nutrition Education and Counseling: No recommendations at this time (Dementia; periods agitation)  Coordination of Nutrition Care: Continue to monitor while inpatient    Goals:  Accept min 100% one ONS daily and 25% all meals by discharge.         Nutrition Monitoring and Evaluation:   Behavioral-Environmental Outcomes: None identified  Food/Nutrient Intake Outcomes: Food and nutrient intake, Supplement intake, Enteral nutrition intake/tolerance  Physical Signs/Symptoms Outcomes: Biochemical data, Chewing or swallowing, Meal time behavior, Nutrition focused physical findings, Weight    Discharge Planning:    Continue oral nutrition supplement BID-TID with max assist.     Rose Marie Latif RD     Contact via [employer]

## 2021-08-12 NOTE — PROGRESS NOTES
Bedside and Verbal shift change report given to Tiny Khalil RN (oncoming nurse) by Alexi Sam RN (offgoing nurse). Report included the following information SBAR.

## 2021-08-12 NOTE — PROGRESS NOTES
Problem: Mobility Impaired (Adult and Pediatric)  Goal: *Acute Goals and Plan of Care (Insert Text)  Description: FUNCTIONAL STATUS PRIOR TO ADMISSION: Pt had been ambulatory with RW until recently  - pt more disoriented and required sitter. HOME SUPPORT PRIOR TO ADMISSION: The patient lived in [facility] with wife with paid caregivers. Physical Therapy Goals  Initiated 8/12/2021  1. Patient will move from supine to sit and sit to supine  and scoot up and down in bed with minimal assistance/contact guard assist within 7 day(s). 2.  Patient will transfer from bed to chair and chair to bed with minimal assistance/contact guard assist using the least restrictive device within 7 day(s). 3.  Patient will perform sit to stand with minimal assistance/contact guard assist within 7 day(s). 4.  Patient will ambulate with minimal assistance/contact guard assist for 50 feet with the least restrictive device within 7 day(s). Outcome: Progressing Towards Goal   PHYSICAL THERAPY EVALUATION  Patient: Linda Arellano (17 y.o. male)  Date: 8/12/2021  Primary Diagnosis: Lactic acidosis [E87.2]  Procedure(s) (LRB):  CYSTOSCOPY BIPOLAR TRANSURETHRAL RESECTION OF PROSTATE WITH ZARAGOZA PLACEMENT (N/A) 1 Day Post-Op   Precautions:   Fall    ASSESSMENT  Based on the objective data described below, the patient presents confusion, restlessness, generalized weakness and decline in functional mobility. Pt with dementia and has been ambulatory with RW with supervision/assist of private sitter - living in [facility] with wife. Recently pt has become more disoriented and more difficult for sitter to keep pt calm and free from injury. Pt has been requiring restraints - today with son present pt calmer and removed restraints - pt initiating amb with RW - required mod assist x 1 and standby guard of 2nd person. If able to mobilize pt safely and get up in recliner may decrease pt's restlessness/agitation.     Current Level of Function Impacting Discharge (mobility/balance): Mod assist x 2    Functional Outcome Measure: The patient scored 20/100 on the Barthel Index outcome measure . Other factors to consider for discharge: family deciding on [facility] if they will accept pt back or SNF with Hospice     Patient will benefit from skilled therapy intervention to address the above noted impairments. PLAN :  Recommendations and Planned Interventions: bed mobility training, transfer training, gait training, therapeutic exercises, patient and family training/education, and therapeutic activities      Frequency/Duration: Patient will be followed by physical therapy:  3 times a week to address goals. Recommendation for discharge: (in order for the patient to meet his/her long term goals)  To be determined: This discharge recommendation:  Has been made in collaboration with the attending provider and/or case management    IF patient discharges home will need the following DME: patient owns DME required for discharge         SUBJECTIVE:   Patient stated Servando Arroyo was born in [birth year].   born in 1933    OBJECTIVE DATA SUMMARY:   HISTORY:    Past Medical History:   Diagnosis Date    Arthritis     Asthma     Benign prostate hyperplasia     Dementia ([facility] [address]     Hypothyroidism     Spinal stenosis of lumbar region with radiculopathy 6/26/2013     Past Surgical History:   Procedure Laterality Date    HX OTHER SURGICAL      cyst removed from back    HX TONSIL AND ADENOIDECTOMY         Personal factors and/or comorbidities impacting plan of care: as above    Home Situation  Home Environment: Assisted living (with wife)    EXAMINATION/PRESENTATION/DECISION MAKING:   Critical Behavior:  Neurologic State: Confused  Orientation Level: Disoriented X4  Cognition: Decreased attention/concentration, Decreased command following  Safety/Judgement: Decreased awareness of environment, Decreased awareness of need for assistance, Decreased awareness of need for safety, Decreased insight into deficits  Hearing: Auditory  Auditory Impairment: None  Range Of Motion:  AROM: Generally decreased, functional                       Strength:    Strength: Generally decreased, functional                    Tone & Sensation:   Tone: Normal      Sensation unable to assess secondary to confusion                        Coordination:  Coordination: Generally decreased, functional  Functional Mobility:  Bed Mobility:      Pt sitting on edge of bed with son upon arrival in pt's room; pt left in recliner        Transfers:  Sit to Stand: Moderate assistance;Assist x2  Stand to Sit: Moderate assistance;Assist x2                       Balance:   Sitting: Intact; Without support (with close supervision )  Standing: Impaired; With support  Standing - Static: Fair;Constant support  Standing - Dynamic : Poor;Constant support  Ambulation/Gait Training:  Distance (ft): 20 Feet (ft)  Assistive Device: Walker, rolling;Gait belt  Ambulation - Level of Assistance: Moderate assistance;Assist x1 (CGA of 2nd person; assist to manage RW and initiate gait)        Gait Abnormalities: Ataxic;Decreased step clearance;Shuffling gait        Base of Support: Center of gravity altered;Narrowed     Speed/Bhavani: Slow  Step Length: Right shortened;Left shortened             Functional Measure:  Barthel Index:    Bathin  Bladder: 0  Bowels: 0  Groomin  Dressin  Feedin  Mobility: 0  Stairs: 0  Toilet Use: 5  Transfer (Bed to Chair and Back): 5  Total: 20/100       The Barthel ADL Index: Guidelines  1. The index should be used as a record of what a patient does, not as a record of what a patient could do. 2. The main aim is to establish degree of independence from any help, physical or verbal, however minor and for whatever reason. 3. The need for supervision renders the patient not independent. 4. A patient's performance should be established using the best available evidence.  Asking the patient, friends/relatives and nurses are the usual sources, but direct observation and common sense are also important. However direct testing is not needed. 5. Usually the patient's performance over the preceding 24-48 hours is important, but occasionally longer periods will be relevant. 6. Middle categories imply that the patient supplies over 50 per cent of the effort. 7. Use of aids to be independent is allowed. [employer]., Barthel, D.W. (9630). Functional evaluation: the Barthel Index. [facility] (14)2. Marisol Whittington johnnie KEVIN Clark, Terrell Corrigan., Mike Bray., Adalberto, [address] (1999). Measuring the change indisability after inpatient rehabilitation; comparison of the responsiveness of the Barthel Index and Functional [place]ore Measure. Journal of Neurology, Neurosurgery, and Psychiatry, 66(4), 188-864. CHERYL Muro, DAYDAY Cortes, & Bola Brantley M.A. (2004.) Assessment of post-stroke quality of life in cost-effectiveness studies: The usefulness of the Barthel Index and the EuroQoL-5D. Quality of Life Research, 15, 599-51           Physical Therapy Evaluation Charge Determination   History Examination Presentation Decision-Making   LOW Complexity : Zero comorbidities / personal factors that will impact the outcome / POC LOW Complexity : 1-2 Standardized tests and measures addressing body structure, function, activity limitation and / or participation in recreation  LOW Complexity : Stable, uncomplicated  LOW Complexity : FOTO score of       Based on the above components, the patient evaluation is determined to be of the following complexity level: LOW     Pain Rating:  Pt confused - unable to assess - no c/o of pain offered by patient.     Activity Tolerance:   Fair - when pt in restraints for own safety - pt very restless and continuously attempting to sit up on edge of bed; today with family members and sitter present - pt more calm out of restraints - amb short distance and remained in recliner chair with direct supervision at all times for safety (prevent pt from pulling out IV lines)    After treatment patient left in no apparent distress:   Sitting in chair, Call bell within reach, Bed / chair alarm activated, and Caregiver / family present    COMMUNICATION/EDUCATION:   The patients plan of care was discussed with: Registered nurse. Fall prevention education was provided and the patient/caregiver indicated understanding., Patient/family have participated as able in goal setting and plan of care. , and Patient/family agree to work toward stated goals and plan of care.     Thank you for this referral.  Saida Dennis, PT   Time Calculation: 35 mins

## 2021-08-12 NOTE — PROGRESS NOTES
Assessment/Plan:    Yanna Betancourt is a 80 y.o. male with a history of BPH, dementia s/p TURP on 8/11/21 in setting of chronic Orta catheterization, urinary retention, P[place]. - Case went well. Urethral channel clearly patent however bladder showed severe trabeculations and signs of chronic bladder outlet obstruction. May or not pass void trial. We deferred urodynamics preoperatively based on his functional status and dementia. No further surgical intervention would be planned if he failed. - Orta removed this morning. Monitor for spontaneous voiding. Needs post void residual bladder scans if he is voiding to ensure complete bladder emptying. Bladder scan either way by 11AM if he has not voided to assess bladder volume  - If he passes VT nothing else to do. If he fails, replace Orta and consider one more VT as an outpatient in 1 week or so  - Continue Flomax/finasteride      Subjective:  He is more alert today, still not oriented and confused. But talking to me. Says \"good morning\". Sitting up in bed. Orta already out. Still in restraints. Denies pain. Denies urge to urinate. Isolated tachycardia x1 overnight but otherwise AFVSS.      Objective:  Visit Vitals  /73 (BP 1 Location: Right upper arm, BP Patient Position: At rest)   Pulse (!) 101   Temp 98 °F (36.7 °C)   Resp 17   Ht 5' 7\" (1.702 m)   Wt 55.9 kg (123 lb 3.8 oz)   SpO2 96%   BMI 19.30 kg/m²         Intake/Output Summary (Last 24 hours) at 8/12/2021 0740  Last data filed at 8/11/2021 1700  Gross per 24 hour   Intake 730 ml   Output 275 ml   Net 455 ml       Physical Exam  General: NAD, alert, not oriented, in restraints  HEENT: NC/AT, EOMI, MMM  Pulmonary: Normal work of breathing on RA  Cardiovascular: Regular rate & rhythm, HDS, adequate peripheral perfusion  Abdomen: soft, NTTP, nondistended, no suprapubic fullness or tenderness  : Orta already removed  Extremities: warm and well perfused, no edema    Recent Results (from the past 24 hour(s))   CBC WITH AUTOMATED DIFF    Collection Time: 08/12/21  3:20 AM   Result Value Ref Range    WBC 8.8 4.1 - 11.1 K/uL    RBC 3.82 (L) 4.10 - 5.70 M/uL    HGB 11.8 (L) 12.1 - 17.0 g/dL    HCT 33.7 (L) 36.6 - 50.3 %    MCV 88.2 80.0 - 99.0 FL    MCH 30.9 26.0 - 34.0 PG    MCHC 35.0 30.0 - 36.5 g/dL    RDW 13.9 11.5 - 14.5 %    PLATELET 148 918 - 805 K/uL    MPV 9.6 8.9 - 12.9 FL    NRBC 0.0 0  WBC    ABSOLUTE NRBC 0.00 0.00 - 0.01 K/uL    NEUTROPHILS 83 (H) 32 - 75 %    LYMPHOCYTES 13 12 - 49 %    MONOCYTES 3 (L) 5 - 13 %    EOSINOPHILS 1 0 - 7 %    BASOPHILS 0 0 - 1 %    IMMATURE GRANULOCYTES 0 0.0 - 0.5 %    ABS. NEUTROPHILS 7.3 1.8 - 8.0 K/UL    ABS. LYMPHOCYTES 1.1 0.8 - 3.5 K/UL    ABS. MONOCYTES 0.3 0.0 - 1.0 K/UL    ABS. EOSINOPHILS 0.1 0.0 - 0.4 K/UL    ABS. BASOPHILS 0.0 0.0 - 0.1 K/UL    ABS. IMM.  GRANS. 0.0 0.00 - 0.04 K/UL    DF AUTOMATED     METABOLIC PANEL, BASIC    Collection Time: 08/12/21  3:20 AM   Result Value Ref Range    Sodium 137 136 - 145 mmol/L    Potassium 4.2 3.5 - 5.1 mmol/L    Chloride 106 97 - 108 mmol/L    CO2 22 21 - 32 mmol/L    Anion gap 9 5 - 15 mmol/L    Glucose 119 (H) 65 - 100 mg/dL    BUN 20 6 - 20 MG/DL    Creatinine 0.70 0.70 - 1.30 MG/DL    BUN/Creatinine ratio 29 (H) 12 - 20      GFR est AA >60 >60 ml/min/1.73m2    GFR est non-AA >60 >60 ml/min/1.73m2    Calcium 8.8 8.5 - 10.1 MG/DL       Imaging:  None new

## 2021-08-12 NOTE — PROGRESS NOTES
JERO: Anticipate discharge to [facility], who plans to do bedside assessment to day at 5 PM. [facility] hospice is following. The barrier to discharge is that patient is currently in restraints, and most facilities will require patient to be off restraints for 24 hours prior to discharge. [facility] and [facility] are both reviewing, however neither of these facilities have private rooms available, and family insists on private room. ESPERANZA spoke with Bryan Ace at [facility]. They will review patient, but will not have any beds until next week. Confirmed that patient must be off restraints for 24 hours prior to discharge, even if he comes under hospice. CM called [facility] and left message. CM met with son, obtained additional SNF choices: [facility] and [id number] [employer], [facility], and [facility]. Son is also in Communication with [facility], who plans to do a bedside with patient this afternoon. CM called [facility] and left message for admissions. CM called [facility] and left message. CM called [facility] and [id number] [employer] #[phone], they do not have any beds available. ESPERANZA spoke with Tera with [facility] #[phone]. He plans to come to the hospital today at 5 PM to do bedside assessmet.  CM faxed clinicals to them at [facility], BSW/CRM

## 2021-08-12 NOTE — PROGRESS NOTES
Patient has not voided since rodriguez catheter being removed. Bladder scan for 134 mL. Dr. Jase Kerr, urology, informed and said to continue [facility] bladder scans and place rodriguez if patient was scanned for >450 mL.

## 2021-08-13 VITALS
HEART RATE: 95 BPM | RESPIRATION RATE: 16 BRPM | OXYGEN SATURATION: 97 % | DIASTOLIC BLOOD PRESSURE: 61 MMHG | BODY MASS INDEX: 19.34 KG/M2 | WEIGHT: 123.24 LBS | SYSTOLIC BLOOD PRESSURE: 107 MMHG | TEMPERATURE: 97.9 F | HEIGHT: 67 IN

## 2021-08-13 PROCEDURE — 74011250637 HC RX REV CODE- 250/637: Performed by: UROLOGY

## 2021-08-13 PROCEDURE — 74011250636 HC RX REV CODE- 250/636: Performed by: UROLOGY

## 2021-08-13 PROCEDURE — 74011250636 HC RX REV CODE- 250/636: Performed by: HOSPITALIST

## 2021-08-13 PROCEDURE — 74011250637 HC RX REV CODE- 250/637: Performed by: HOSPITALIST

## 2021-08-13 PROCEDURE — 74011000250 HC RX REV CODE- 250: Performed by: UROLOGY

## 2021-08-13 PROCEDURE — 97535 SELF CARE MNGMENT TRAINING: CPT

## 2021-08-13 PROCEDURE — 97168 OT RE-EVAL EST PLAN CARE: CPT

## 2021-08-13 PROCEDURE — 51798 US URINE CAPACITY MEASURE: CPT

## 2021-08-13 RX ORDER — HALOPERIDOL 1 MG/1
2 TABLET ORAL 3 TIMES DAILY
Status: DISCONTINUED | OUTPATIENT
Start: 2021-08-13 | End: 2021-08-13

## 2021-08-13 RX ORDER — TAMSULOSIN HYDROCHLORIDE 0.4 MG/1
0.4 CAPSULE ORAL DAILY
Qty: 30 CAPSULE | Refills: 3 | Status: SHIPPED | OUTPATIENT
Start: 2021-08-13

## 2021-08-13 RX ORDER — MORPHINE SULFATE 20 MG/5ML
5 SOLUTION ORAL
Qty: 30 ML | Refills: 0 | Status: SHIPPED | OUTPATIENT
Start: 2021-08-13 | End: 2021-08-27

## 2021-08-13 RX ORDER — OLANZAPINE 5 MG/1
5 TABLET ORAL
Status: DISCONTINUED | OUTPATIENT
Start: 2021-08-13 | End: 2021-08-13 | Stop reason: HOSPADM

## 2021-08-13 RX ORDER — OLANZAPINE 5 MG/1
5 TABLET ORAL
Qty: 30 TABLET | Refills: 0 | Status: SHIPPED | OUTPATIENT
Start: 2021-08-13 | End: 2021-08-13

## 2021-08-13 RX ORDER — RIVASTIGMINE 9.5 MG/24H
1 PATCH, EXTENDED RELEASE TRANSDERMAL DAILY
Qty: 30 PATCH | Refills: 1 | Status: SHIPPED | OUTPATIENT
Start: 2021-08-14

## 2021-08-13 RX ORDER — FINASTERIDE 5 MG/1
5 TABLET, FILM COATED ORAL DAILY
Qty: 30 TABLET | Refills: 2 | Status: SHIPPED | OUTPATIENT
Start: 2021-08-13

## 2021-08-13 RX ORDER — SERTRALINE HYDROCHLORIDE 25 MG/1
25 TABLET, FILM COATED ORAL DAILY
Qty: 30 TABLET | Refills: 2 | Status: SHIPPED | OUTPATIENT
Start: 2021-08-13

## 2021-08-13 RX ORDER — HALOPERIDOL 1 MG/1
2 TABLET ORAL 3 TIMES DAILY
Status: DISCONTINUED | OUTPATIENT
Start: 2021-08-13 | End: 2021-08-13 | Stop reason: HOSPADM

## 2021-08-13 RX ORDER — HALOPERIDOL 5 MG/ML
4 INJECTION INTRAMUSCULAR ONCE
Status: DISCONTINUED | OUTPATIENT
Start: 2021-08-13 | End: 2021-08-13

## 2021-08-13 RX ORDER — HALOPERIDOL 5 MG/ML
4 INJECTION INTRAMUSCULAR ONCE
Status: COMPLETED | OUTPATIENT
Start: 2021-08-13 | End: 2021-08-13

## 2021-08-13 RX ORDER — HALOPERIDOL 2 MG/1
2 TABLET ORAL 3 TIMES DAILY
Qty: 90 TABLET | Refills: 1 | Status: SHIPPED | OUTPATIENT
Start: 2021-08-13

## 2021-08-13 RX ADMIN — FAMOTIDINE 20 MG: 10 INJECTION, SOLUTION INTRAVENOUS at 09:55

## 2021-08-13 RX ADMIN — DEXTROSE AND SODIUM CHLORIDE 75 ML/HR: 5; 450 INJECTION, SOLUTION INTRAVENOUS at 09:59

## 2021-08-13 RX ADMIN — HALOPERIDOL LACTATE 4 MG: 5 INJECTION, SOLUTION INTRAMUSCULAR at 09:51

## 2021-08-13 RX ADMIN — HALOPERIDOL 2 MG: 1 TABLET ORAL at 12:00

## 2021-08-13 RX ADMIN — LEVOTHYROXINE SODIUM 62.5 MCG: 0.12 TABLET ORAL at 06:42

## 2021-08-13 RX ADMIN — HALOPERIDOL LACTATE 1 MG: 5 INJECTION, SOLUTION INTRAMUSCULAR at 08:06

## 2021-08-13 RX ADMIN — ENOXAPARIN SODIUM 40 MG: 40 INJECTION SUBCUTANEOUS at 09:58

## 2021-08-13 NOTE — PROGRESS NOTES
Bedside and Verbal shift change report given to Kolton Kwon (oncoming nurse) by Jaymie Monahan (offgoing nurse). Report included the following information SBAR, Kardex, Procedure Summary, Intake/Output and MAR.

## 2021-08-13 NOTE — DISCHARGE SUMMARY
Discharge Summary     Patient:  Allen Kenny       MRN: [id number]       YOB: 1933       Age: 80 y.o. Date of admission:  7/28/2021    Date of discharge:  8/13/2021    Primary care provider: Dr. Radha Briseno MD    Admitting provider:  Rao Garza MD    Discharging provider:  Priscila Mandujano MD - [phone]  If unavailable, call [phone] and ask the  to page the triage hospitalist.    Consultations  · IP CONSULT TO PSYCHIATRY  · IP CONSULT TO UROLOGY  · IP CONSULT TO PALLIATIVE CARE - PROVIDER    Procedures  · Procedure(s):  · [address]    Discharge destination: [facility] with hospice . The patient is stable for discharge. Admission diagnosis  · Lactic acidosis [E87.2]    Current Discharge Medication List      START taking these medications    Details   rivastigmine (EXELON) 9.5 mg/24 hour patch 1 Patch by TransDERmal route daily. Qty: 30 Patch, Refills: 1  Start date: 8/14/2021      haloperidoL (HALDOL) 2 mg tablet Take 1 Tablet by mouth three (3) times daily. Qty: 90 Tablet, Refills: 1  Start date: 8/13/2021      morphine 20 mg/5 mL (4 mg/mL) solution Take 1.25 mL by mouth every four (4) hours as needed (for pain/air hunger) for up to 14 days. Max Daily Amount: 30 mg.  Qty: 30 mL, Refills: 0  Start date: 8/13/2021, End date: 8/27/2021    Associated Diagnoses: Hospice care         CONTINUE these medications which have CHANGED    Details   finasteride (PROSCAR) 5 mg tablet Take 1 Tablet by mouth daily. Qty: 30 Tablet, Refills: 2  Start date: 8/13/2021      tamsulosin (FLOMAX) 0.4 mg capsule Take 1 Capsule by mouth daily. Qty: 30 Capsule, Refills: 3  Start date: 8/13/2021      sertraline (ZOLOFT) 25 mg tablet Take 1 Tablet by mouth daily.  Indications: major depressive disorder  Qty: 30 Tablet, Refills: 2  Start date: 8/13/2021 CONTINUE these medications which have NOT CHANGED    Details   ciprofloxacin (Cipro) 500 mg/5 mL suspension Take 500 mg by mouth two (2) times a day. STOP taking these medications       donepeziL (ARICEPT) 10 mg tablet Comments:   Reason for Stopping:         levothyroxine (SYNTHROID) 50 mcg tablet Comments:   Reason for Stopping:         LORazepam (INTENSOL) 2 mg/mL concentrated solution Comments:   Reason for Stopping:         memantine (NAMENDA) 10 mg tablet Comments:   Reason for Stopping:         QUEtiapine (SEROqueL) 50 mg tablet Comments:   Reason for Stopping:         levothyroxine (SYNTHROID) 75 mcg tablet Comments:   Reason for Stopping: Follow-up Information     Follow up With Specialties Details Why Contact Info    [facility] at the [place] [address] [address] [place] [address] Street    #[phone]    [facility]  Today [address] [place] [address] Anna Muniz MD    Patient can only remember the practice name and not the physician            Final discharge diagnoses and brief hospital course  Please also refer to the admission H&P for details on the presenting problem. 81 yo male with Advanced Dementia, BPH, admitted with combativeness and agitation. Pt was at [address] and was going to be transferred to Psych unit in [place] but they needed rountine labs and was sent to ER.        dementia with behavioral disturbance likely delirium d/t UTI  Chronic catheter associated UTI  S/p meropenem     Lactic acidosis, resolved.     Dementia of Alzheimer's type  cont exelon patch  Haldol 2mg TID to control agitations, this may need to be adjusted      History of benign prostatic hypertrophy and chronic rodriguez  continue with the Flomax and [facility]  Urology consulted, s/p TURP on 8/11  Rodriguez removed 8/12 , failed Voiding trial, Rodriguez replaced overnight  Patient suppose to follow up with Urology for voiding trial in 1 week but family will decide how patient is doing and determine if they want rodriguez removal and voiding trial.      Hypothyroidism, on thyroid replacement  TSH elevated, increase LT4 from 50 to 62. 5mcg  D/c Synthroid, patient being admitted to hospice      Hypokalemia, repleted     Dysphagia and malnutrition due to dementia and poor PO intake  Encourage PO as able  On dysphagia diet      FOLLOW-UP TESTS recommended:   - hospice care, patient may ambulate as able with assitance  -      PENDING TEST RESULTS:  At the time of your discharge the following test results are still pending: NONE  Please make sure you review these results with your outpatient follow-up provider(s).     SYMPTOMS to watch for: chest pain, shortness of breath, fever, chills, nausea, vomiting, diarrhea, change in mentation, falling, weakness, bleeding.     DIET/what to eat:  Dysphagia diet, may have ice cream, ensure     ACTIVITY:  Activity as tolerated     WOUND CARE: NONE     EQUIPMENT needed:  NONE    Physical examination at discharge  Visit Vitals  BP (!) 110/59 (BP 1 Location: Left upper arm, BP Patient Position: At rest)   Pulse 61   Temp 97.6 °F (36.4 °C)   Resp 17   Ht 5' 7\" (1.702 m)   Wt 55.9 kg (123 lb 3.8 oz)   SpO2 99%   BMI 19.30 kg/m²     Awake and calm when examined  Confused  Lung Clear  CVS: RRR  Abd: soft NT ND   Ext: no edema           Recent Labs     08/12/21  0320   WBC 8.8   HGB 11.8*   HCT 33.7*        Recent Labs     08/12/21  0320      K 4.2      CO2 22   BUN 20   CREA 0.70   *   CA 8.8     No results for input(s): AP, TBIL, TP, ALB, GLOB, GGT, AML, LPSE in the last 72 hours. No lab exists for component: SGOT, GPT, AMYP, HLPSE  No results for input(s): INR, PTP, APTT, INREXT in the last 72 hours. No results for input(s): FE, TIBC, PSAT, FERR in the last 72 hours. No results for input(s): PH, PCO2, PO2 in the last 72 hours.   No results for input(s): CPK, CKMB in the last 72 hours. 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 Signed:  Karlos Kam MD                 Hospitalist, Internal Medicine      Cc:  Other, MD Anna

## 2021-08-13 NOTE — PROGRESS NOTES
JERO: Anticipate discharge to [facility] with [facility]. [facility] #[phone] stretcher transport set for 3 PM    Discharge folder located on hard chart to include discharge instructions and discharge summary.         CM called Tera with [facility] #[phone]. He can be at [address] [place] [address] around 2 PM to sign paperwork with the son. CM spoke with Citlaly Mack with [facility]. Will plan for transport around 3 PM.    CM called [facility] and arranged stretcher for 3 PM.    CM called Kurt with [facility] and left message to notify of transport time. Medicare pt has received, reviewed, and signed 2nd IM letter informing them of their right to appeal the discharge. Signed copy has been placed on pt bedside chart.       Prabha Ramirez, RAYSHAWNW/CRM

## 2021-08-13 NOTE — PROGRESS NOTES
Patient: Allen Kenny MRN: [id number]  SSN: [id number]    YOB: 1933  Age: 80 y.o. Sex: male        ADMITTED: 2021 to Azul Sandoval MD by Rao Garza MD for Lactic acidosis [E87.2]  POD# 2 Days Post-Op Procedure(s):  CYSTOSCOPY BIPOLAR TRANSURETHRAL RESECTION OF PROSTATE WITH ZARAGOZA PLACEMENT    Allen Kenny is doing fair , failed VT zaragoza was reinserted overnight. Pt confused and combative this morning . Has 1 to 1 observer in room . Nurse reports pt tugging on catheter all night . Zaragoza noted to be leaking in bed . Zaragoza deflated and advanced , difficult with pt moving . Zaragoza inflated with 13 cc NS . Will wait till more calm then attempt again . Draining pink tinged urine     Vitals: Temp (24hrs), Av.1 °F (36.7 °C), Min:97.6 °F (36.4 °C), Max:98.8 °F (37.1 °C)    Blood pressure (!) 114/56, pulse 98, temperature 97.6 °F (36.4 °C), resp. rate 16, height 5' 7\" (1.702 m), weight 55.9 kg (123 lb 3.8 oz), SpO2 100 %. Intake and Output:   1901 -  0700  In: -   Out: 700 [Urine:700]  No intake/output data recorded.       Exam:   EXAMINATION:    Appearance:  Frail .elderly confused     Conjunctiva/Lids: conjunctivae and lids normal   External Ears/Nose:   normal no lesions or deformities   Neck:  supple   Respiratory Effort:  breathing easily   Lower Extremity: no edema   Abdomen/Flank: soft non-tender without masses; no CVA tenderness, Zaragoza draining pink urine    Liver/Spleen: no organomegaly     Hernia: no ventral hernia    Gait/Station: Not assessed    Skin Inspection:  warm and dry   Mood/Affect: Confused , agitated combative                          Labs:  CBC:   Lab Results   Component Value Date/Time    WBC 8.8 2021 03:20 AM    HCT 33.7 (L) 2021 03:20 AM    PLATELET 979  03:20 AM     BMP:   Lab Results   Component Value Date/Time    Glucose 119 (H) 2021 03:20 AM    Sodium 137 2021 03:20 AM    Potassium 4.2 2021 03:20 AM Chloride 106 08/12/2021 03:20 AM    CO2 22 08/12/2021 03:20 AM    BUN 20 08/12/2021 03:20 AM    Creatinine 0.70 08/12/2021 03:20 AM    Calcium 8.8 08/12/2021 03:20 AM     Cultures: N/A    Imaging:N/A  Assessment/Plan:     1. BPH  S/p TURP for urinary retention , failed VT yesterday  Rodriguez replaced . Plan for DC to SNF with Hospice , will need rodriguez at DC with monthly changes at facility or VU . Will monitor throughout day for continued leakage     Signed By: Errol January.  Jojo Stevenson NP - August 13, 2021

## 2021-08-13 NOTE — PROGRESS NOTES
Problem: Self Care Deficits Care Plan (Adult)  Goal: *Acute Goals and Plan of Care (Insert Text)  Description:   FUNCTIONAL STATUS PRIOR TO ADMISSION: Patient is poor historian. Per chart review. He lives in [address] with wife with assist with ADL tasks. HOME SUPPORT: The patient lived with at [facility]. Occupational Therapy Goals  Reevaluated 8/13/2021 s/p failed TURP  Initiated 8/9/2021  1. Patient will perform grooming with maximal assistance within 7 day(s). 2.  Patient will perform upper body dressing with maximal assistance within 7 day(s). 3.  Patient will perform self-feeding with moderate assistance  within 7 day(s). 4.  Patient will perform toilet transfers with moderate assistance  within 7 day(s). Outcome: Progressing Towards Goal     OCCUPATIONAL THERAPY RE-EVALUATION  Patient: Sylvester South (19 y.o. male)  Date: 8/13/2021  Diagnosis: Lactic acidosis [E87.2] <principal problem not specified>  Procedure(s) (LRB):  CYSTOSCOPY BIPOLAR TRANSURETHRAL RESECTION OF PROSTATE WITH ZARAGOZA PLACEMENT (N/A) 2 Days Post-Op  Precautions: Fall  Chart, occupational therapy assessment, plan of care, and goals were reviewed. ASSESSMENT  Based on the objective data described below, pt received sitting EOB with bed alarm sounding and son yelling out for help. Pt sitting EOB and trying to get out of bed. Pt very unsafe while EOB. No nursing staff available so therapist entered room to prevent a fall. Pt's son insisting to have the patient stand and walk. Identified the patient requires griper socks or shoes on his feet but son refusing to allow therapist to place socks on his feet stating \"He does better without socks. \"  strongly encouraged the patients son this was not safe and we would not progress further than EOB. Pt returned to supine in bed and noted with bowel movement.   Nursing notified and therapist and nurse assisted with bathing, toileting hygiene (rolling in bed and total A for bowel hygiene), and changing patients gown. Pt will require discharge to facility with 24/7 care. He does demonstrate improved calm behaviors with family present. Following intervention, spoke with hospitalist and received orders to continue intervention with patient. Current Level of Function Impacting Discharge (ADLs): total A for ADL, max A x 2 for standing activities    Other factors to consider for discharge: cognitive status          PLAN :  Recommendations and Planned Interventions: self care training, functional mobility training, therapeutic exercise, balance training, therapeutic activities, endurance activities, patient education, home safety training, and family training/education    Frequency/Duration: Patient will be followed by occupational therapy 2 times a week to address goals. Recommend with staff: bed in chair position as tolerated    Recommend next OT session: grooming activities    Recommendation for discharge: (in order for the patient to meet his/her long term goals)  To be determined: 24/7 care    This discharge recommendation:  Has been made in collaboration with the attending provider and/or case management    Equipment recommendations for successful discharge (if) home: none       SUBJECTIVE:   Patient stated Sanchez Rufus me alone! Juventino Niño    OBJECTIVE DATA SUMMARY:   Hospital course since last seen and reason for reevaluation: s/p TURP yesterday    Cognitive/Behavioral Status:  Neurologic State: Confused  Orientation Level: Disoriented X4  Cognition: Decreased command following;Decreased attention/concentration  Perception: Appears intact  Perseveration: Perseverates during ADLS  Safety/Judgement: Decreased awareness of environment;Decreased awareness of need for assistance;Decreased awareness of need for safety;Decreased insight into deficits; Lack of insight into deficits    Skin: bowel movement noted while sitting EOB and pt putting his fingers in his bowel movement    Edema: none noted    Hearing: Auditory  Auditory Impairment: None    Vision/Perceptual:                           Acuity: Within Defined Limits         Range of Motion:    AROM: Grossly decreased, non-functional  PROM: Grossly decreased, non-functional                      Strength:    Strength: Grossly decreased, non-functional                Coordination:  Coordination: Grossly decreased, non-functional  Fine Motor Skills-Upper: Right Intact; Left Intact    Gross Motor Skills-Upper: Right Impaired;Left Impaired    Tone & Sensation:    Tone: Normal  Sensation: Intact                        Functional Mobility and Transfers for ADLs:  Bed Mobility:  Rolling: Total assistance  Supine to Sit: Total assistance  Sit to Supine: Total assistance    Transfers:  Sit to Stand: Maximum assistance;Assist x2 (son insisting to have pt stand and very unsafe)          Balance:  Sitting: Impaired;High guard; Without support  Sitting - Static: Poor (constant support)  Sitting - Dynamic: Poor (constant support)  Standing: Impaired; With support (son insisting to have pt stand and very unsafe)    ADL Assessment:  Feeding: Total assistance    Oral Facial Hygiene/Grooming: Total assistance    Bathing: Total assistance    Upper Body Dressing: Total assistance    Lower Body Dressing: Total assistance    Toileting: Total assistance                ADL Intervention and task modifications:   Pt received EOB with son present. Son yelling out for support and help due to pat climbing out of the bed. Therapist intervened to prevent a fall while sitting EOB. No nursing staff available to answer calling bed alarm so therapist entered room. Therapist able to block pt from further climbing out of bed and returned to sitting upright EOB. Son wanting to have patient walk without socks and insisting he does better without socks. Assisted to standing by son' insistence and instructed him this was not safe and we would not walk any further.   Pt returned to supine in bed with total A. Pt noted with bowel movement all over the sheets. RN notified and then assisted under nursing supervision to complete hygiene. He requires total A for rolling in bed. He requires total A for hygiene. Pt given CHG bath while rolling. Gown changed with total A. He then repositioned in bed with son and wife present. Bed alarm in place and active. Spoke with MD following intervention and received continued of OT orders. Cognitive Retraining  Safety/Judgement: Decreased awareness of environment;Decreased awareness of need for assistance;Decreased awareness of need for safety;Decreased insight into deficits; Lack of insight into deficits        Pain:  No pain    Activity Tolerance:   Fair    After treatment patient left in no apparent distress:   Supine in bed, Call bell within reach, Bed / chair alarm activated, Caregiver / family present, and Side rails x 3    COMMUNICATION/COLLABORATION:   The patients plan of care was discussed with: Physical therapist and Registered nurse.      Luc Vance OT  Time Calculation: 30 mins

## 2021-08-13 NOTE — DISCHARGE INSTRUCTIONS
Please bring this form with you to show your primary care provider at your follow-up appointment. Primary care provider:  Dr. Mariam Hickey MD    Discharging provider:  Zachary Haney MD    You have been admitted to the hospital with the following diagnoses:  · Lactic acidosis [E87.2]    FOLLOW-UP CARE RECOMMENDATIONS:    APPOINTMENTS:  Follow-up Information     Follow up With Specialties Details Why Contact Info    [facility] at the [address] [place] [address], [address] [place] [address]    #[phone]    Leigh Dickey [address]  Today [address] [place] [address] Nicholas IrbyAnna Valera MD    Patient can only remember the practice name and not the physician              FOLLOW-UP TESTS recommended:   - hospice care, patient may ambulate as able with assitance  -     PENDING TEST RESULTS:  At the time of your discharge the following test results are still pending: NONE  Please make sure you review these results with your outpatient follow-up provider(s). SYMPTOMS to watch for: chest pain, shortness of breath, fever, chills, nausea, vomiting, diarrhea, change in mentation, falling, weakness, bleeding. DIET/what to eat:  Dysphagia diet, may have ice cream, ensure    ACTIVITY:  Activity as tolerated    WOUND CARE: NONE    EQUIPMENT needed:  NONE      What to do if new or unexpected symptoms occur? If you experience any of the above symptoms (or should other concerns or questions arise after discharge) please call your primary care physician. Return to the emergency room if you cannot get hold of your doctor. · It is very important that you keep your follow-up appointment(s). · Please bring discharge papers, medication list (and/or medication bottles) to your follow-up appointments for review by your outpatient provider(s).   · Please check the list of medications and be sure it includes every medication (even non-prescription medications) that your provider wants you to take. · It is important that you take the medication exactly as they are prescribed. · Keep your medication in the bottles provided by the pharmacist and keep a list of the medication names, dosages, and times to be taken in your wallet. · Do not take other medications without consulting your doctor. · If you have any questions about your medications or other instructions, please talk to your nurse or care provider before you leave the hospital.    I understand that if any problems occur once I am at home I am to contact my physician. These instructions were explained to me and I had the opportunity to ask questions.

## 2021-08-13 NOTE — PROGRESS NOTES
.Bedside shift change report given to Jim Heart RN (oncoming nurse) by Mony Bustillos (offgoing nurse). Report included the following information SBAR, Kardex, Procedure Summary, Intake/Output, MAR and Recent Results.

## 2021-08-14 NOTE — PROGRESS NOTES
Patient's son gave permission for RN to give copy of patient's MAR to representative from [facility].

## 2023-11-02 NOTE — ED PROVIDER NOTES
80-year-old male with history of dementia, asthma, arthritis, hypothyroidism presents to the emergency department for agitation and aggressive behavior. The family reports that he was living at an assisted living facility and has become increasingly more agitated and aggressive. The facility called the family today to inform them that they are unable to continue to have him at the facility due to his aggressive behavior. When patient arrived to the emergency department he was hostile, confused, did not want to be seen or come into the emergency department. He appears scared. Family was unsuccessful in convincing him to be evaluated. He was initially seen by law enforcement and they were unable to place a mental health ECO as he has medical dementia but no psychiatric diagnosis precipitating this event. I contacted the  and a medical ECO was obtained due to concerns that the patient represents a danger to himself and others. I am specifically concerned that he is at high risk for wandering off into traffic, becoming lost, potentially hurting himself or someone else due to his aggressive behavior. The history is provided by a relative and medical records. Mental Health Problem   This is a chronic problem. The problem has been rapidly worsening. Associated symptoms include confusion, agitation and violence. Pertinent negatives include no self-injury. Risk factors include dementia. His past medical history is significant for dementia. His past medical history does not include seizures, CVA, TIA, psychotropic medication treatment or head trauma. Aggressive behavior   Associated symptoms include confusion, agitation and violence. Pertinent negatives include no self-injury. His past medical history is significant for dementia. His past medical history does not include seizures, CVA, TIA, psychotropic medication treatment or head trauma.         Past Medical History:   Diagnosis Date    Arthritis     [facility] - Cardiac Medical ICU  Nephrology  Progress Note    Patient Name: Cosme Lewis  MRN: [id number]  Admission Date: 10/31/2023  Hospital Length of Stay: 1 days  Attending Provider: Jhonny Lezama MD   Primary Care Physician: Eliecer Ohara III, MD  Principal Problem:Cardiac arrest    Subjective:     HPI: Mr. Lewis is a 60 year old male with a PMH of ESRD on hemodialysis MWF via LUE AVF, HTN, HFrEF, DM. He was recently discharged from the hospital on 10/30 after a prolonged hospital stay for altered mental status, acute on chronic hypoxic respiratory failure with hypercapnia, covid pneumonia and CAP. He was subsequently discharged to [facility] where he was last seen well around 2110. Staff checked on him again at 2210 and noted him to be unresponsive without a pulse. ACLS was initiated, when EMS arrived, they noted that he had pink frothy sputum presenting from his mouth, blood glucose was 32. In total he received 35 minutes of CPR, 2 amps of D50W, 5 rounds of epinephrine, 1 push of sodium bicarbonate, 1g of calcium, 450 mL of normal saline. As per chart review, it is thought that the patient removed his BiPAP which resulted in his cardiac arrest. Patient was intubated by EMS in the field. Patient reportedly had a large bloody bowel movement in the ER.     He underwent CT scan of the head, abdomen, pelvis, which showed no acute intracranial abnormalities, no PE, positive for potential pericardial effusion, right hepatic vein reflux of IV contrast, bilateral lung opacities, stable bilateral pleural effusions, diffuse small bowel wall thickening, stable ascites, and sternal fractures.     Labs on arrival showed a hemoglobin of 7.6, Na of 133, K of 5.2, Cl of 101, bicarb of 12 with AG of 20, BUN 43, and Cr of 3.7. He was started on Levophed to maintain MAP> 65mmHg and a right sided trialysis line was placed.     Interval History: Patient remains intubated not on any sedation at this time. Patient is  Asthma     Benign prostate hyperplasia     [facility]     Hypothyroidism     Spinal stenosis of lumbar region with radiculopathy 6/26/2013       Past Surgical History:   Procedure Laterality Date    HX OTHER SURGICAL      cyst removed from back    HX TONSIL AND ADENOIDECTOMY           History reviewed. No pertinent family history. Social History     Socioeconomic History    Marital status:      Spouse name: Not on file    Number of children: Not on file    Years of education: Not on file    Highest education level: Not on file   Occupational History    Not on file   Tobacco Use    Smoking status: Never Smoker    Smokeless tobacco: Never Used   Substance and Sexual Activity    Alcohol use: Yes     Comment: occ.  Drug use: Never    Sexual activity: Not on file   Other Topics Concern     Service Not Asked    Blood Transfusions Not Asked    Caffeine Concern Not Asked    Occupational Exposure Not Asked    Hobby Hazards Not Asked    Sleep Concern Not Asked    Stress Concern Not Asked    Weight Concern Not Asked    Special Diet Not Asked    Back Care Not Asked    Exercise Not Asked    Bike Helmet Not Asked   [address] Not Asked    Self-Exams Not Asked   Social History Narrative    Not on file     Social Determinants of Health     Financial Resource Strain:     Difficulty of Paying Living Expenses:    Food Insecurity:     Worried About Running Out of Food in the Last Year:     [address] in the Last Year:    Transportation Needs:     Lack of Transportation (Medical):      Lack of Transportation (Non-Medical):    Physical Activity:     Days of Exercise per Week:     Minutes of Exercise per Session:    Stress:     Feeling of Stress :    Social Connections:     Frequency of Communication with Friends and Family:     Frequency of Social Gatherings with Friends and Family:     Attends [employer] Services:     Active Member of Clubs or Organizations:     not following commands, he is currently on 0.08 mcg of levophed and a D20W drip. Acidosis improving. Echo did not demonstrate tamponade.     Review of patient's allergies indicates:  No Active Allergies  Current Facility-Administered Medications   Medication Frequency    0.9%  NaCl infusion (CRRT USE ONLY) Continuous    0.9%  NaCl infusion (for blood administration) Q24H PRN    artificial tears 0.5 % ophthalmic solution 1 drop TID    dextrose 10% bolus 125 mL 125 mL PRN    dextrose 10% bolus 250 mL 250 mL PRN    dextrose 20 % infusion Continuous    glucagon (human recombinant) injection 1 mg PRN    glucose chewable tablet 16 g PRN    glucose chewable tablet 24 g PRN    hydrocortisone sodium succinate injection 100 mg Q8H    magnesium sulfate 2g in water 50mL IVPB (premix) PRN    mupirocin 2 % ointment BID    NORepinephrine 8 mg in dextrose 5% 250 mL infusion Continuous    pantoprazole injection 40 mg BID    piperacillin-tazobactam (ZOSYN) 4.5 g in dextrose 5 % in water (D5W) 100 mL IVPB (MB+) Q8H    sodium chloride 0.9% flush 10 mL PRN    vancomycin - pharmacy to dose pharmacy to manage frequency       Objective:     Vital Signs (Most Recent):  Temp: 97.2 °F (36.2 °C) (11/02/23 0735)  Pulse: 92 (11/02/23 0735)  Resp: 17 (11/01/23 1131)  BP: (!) 171/66 (11/01/23 1900)  SpO2: (!) 94 % (11/02/23 0735) Vital Signs (24h Range):  Temp:  [94.3 °F (34.6 °C)-97.2 °F (36.2 °C)] 97.2 °F (36.2 °C)  Pulse:  [] 92  Resp:  [17-34] 17  SpO2:  [80 %-100 %] 94 %  BP: (105-171)/(56-99) 171/66  Arterial Line BP: ()/(50-72) 94/62     Weight: 66.2 kg (145 lb 15.1 oz) (11/01/23 1152)  Body mass index is 21.55 kg/m².  Body surface area is 1.8 meters squared.    I/O last 3 completed shifts:  In: 6775.7 [I.V.:4182.3; Blood:300; NG/GT:40; IV Piggyback:2253.4]  Out: 6180 [Other:6180]     Physical Exam  Constitutional:       Comments: Non-responsive and intubated.    HENT:      Right Ear: External ear normal.      Left Ear: External  Attends Club or Organization Meetings:     Marital Status:    Intimate Partner Violence:     Fear of Current or Ex-Partner:     Emotionally Abused:     Physically Abused:     Sexually Abused: ALLERGIES: Patient has no known allergies. Review of Systems   Unable to perform ROS: Dementia   Psychiatric/Behavioral: Positive for agitation and confusion. Negative for self-injury. There were no vitals filed for this visit. Physical Exam  Vitals and nursing note reviewed. Constitutional:       General: He is not in acute distress. Appearance: Normal appearance. He is not toxic-appearing or diaphoretic. Comments: Agitated gentleman, does not want to talk to me   HENT:      Head: Normocephalic and atraumatic. Nose: Nose normal.      Mouth/Throat:      Mouth: Mucous membranes are moist.      Pharynx: Oropharynx is clear. Eyes:      Extraocular Movements: Extraocular movements intact. Conjunctiva/sclera: Conjunctivae normal.      Pupils: Pupils are equal, round, and reactive to light. Cardiovascular:      Rate and Rhythm: Normal rate and regular rhythm. Pulses: Normal pulses. Heart sounds: Normal heart sounds. Pulmonary:      Effort: Pulmonary effort is normal.      Breath sounds: Normal breath sounds. Abdominal:      General: There is no distension. Palpations: Abdomen is soft. Tenderness: There is no abdominal tenderness. There is no guarding or rebound. Musculoskeletal:         General: No swelling, tenderness, deformity or signs of injury. Normal range of motion. Cervical back: Normal range of motion and neck supple. Right lower leg: No edema. Left lower leg: No edema. Skin:     General: Skin is warm and dry. Capillary Refill: Capillary refill takes less than 2 seconds. Neurological:      Mental Status: He is alert. He is disoriented.       Comments: Patient moves all extremities, appears to be neuro intact, does not ear normal.      Mouth/Throat:      Mouth: Mucous membranes are dry.      Comments: Intubated.   Cardiovascular:      Rate and Rhythm: Normal rate and regular rhythm.      Pulses: Normal pulses.      Heart sounds: Murmur heard.   Pulmonary:      Comments: Mechanical breath sounds.   Abdominal:      General: Abdomen is flat.      Palpations: Abdomen is soft.   Musculoskeletal:         General: No swelling.      Right lower leg: No edema.      Left lower leg: No edema.   Skin:     General: Skin is warm and dry.      Capillary Refill: Capillary refill takes 2 to 3 seconds.   Neurological:      Comments: Non-responsive, eyes open.           Significant Labs:  CBC:   Recent Labs   Lab 11/02/23 0403   WBC 11.40   RBC 3.23*   HGB 9.5*   HCT 29.1*   PLT 69*   MCV 90   MCH 29.4   MCHC 32.6     CMP:   Recent Labs   Lab 11/02/23 0403   GLU 97  97   CALCIUM 7.2*  7.2*   ALBUMIN 1.9*  1.9*   PROT 5.5*   *  134*   K 4.5  4.5   CO2 19*  19*     106   BUN 15  15   CREATININE 1.4  1.4   ALKPHOS 239*   *   *   BILITOT 1.2*     All labs within the past 24 hours have been reviewed.   Assessment/Plan:     Neuro  Anoxic brain injury  -Per primary team    Cardiac/Vascular  * Cardiac arrest  Per primary team.     Pericardial effusion  -Per primary team.   -No tamponade noted on cardiac echo.     Chronic combined systolic and diastolic heart failure  -Per primary team.     Renal/  Metabolic acidosis, increased anion gap  Patient presented with triple acid-base disorder. HAGMA with Anion gap of 20 and serum bicarb of 12. Patient also has a concurrent respiratory acidosis (measured pCP2=38 and expected of 28-32), as well as a non-anion gap metabolic acidosis.     Suspect HAGMA secondary to his lactic acidosis, and NAGMA due to hsi renal failure.     Recommendations  --Continue SLED for his acidosis clearance.   --Respiratory acidosis per primary team.     ESRD (end stage renal disease)  Patient is [facility]  hemodialyisis via LUE AVF, last underwent dialysis on Monday 10/30. Current labs on admission showed acidosis, but otherwise consistent with ESRD.    ESRD on iHD MWF  [facility]  4 hours  EDW: 71 kg  UMA AVF    Recommendations  -Continue 12 hours SLED and 12 hours SCUF today for clearance and fluid removal. Aim for net negative 1.5L  -continue strict I/O's  -RFP daily  -renal diet when not NPO.  -Phos elevated at this time, was WNL on 10/30. Recommend repeating phos levels.     Anemia of chronic disease  Lab Results   Component Value Date    HGB 9.5 (L) 11/02/2023    HCT 29.1 (L) 11/02/2023       Calcium   Date Value Ref Range Status   11/02/2023 7.2 (L) 8.7 - 10.5 mg/dL Final   11/02/2023 7.2 (L) 8.7 - 10.5 mg/dL Final     Phosphorus   Date Value Ref Range Status   11/02/2023 3.6 2.7 - 4.5 mg/dL Final   11/02/2023 3.6 2.7 - 4.5 mg/dL Final       Other  Acute hypoxic respiratory failure  Per primary team        Thank you for your consult. I will follow-up with patient. Please contact us if you have any additional questions.    Dami Tony MD  Nephrology  [facility]   participate in neuro exam   Psychiatric:         Mood and Affect: Mood is anxious. Affect is angry. Speech: He is noncommunicative. Behavior: Behavior is agitated and aggressive. MDM  Number of Diagnoses or Management Options  Diagnosis management comments: 15-year-old male presents as above with progression in the setting of severe dementia. His work-up thus far does not demonstrate any significant medical emergency other than his safety risk and inability to take care of himself. Plan for admission to the hospital to facilitate placement. Amount and/or Complexity of Data Reviewed  Clinical lab tests: reviewed  Tests in the radiology section of CPT®: reviewed  Tests in the medicine section of CPT®: reviewed  Decide to obtain previous medical records or to obtain history from someone other than the patient: yes      ED Course as of Jul 23 1105   Thu Jul 22, 2021   1919 Discussed with  at this time. Will approve 24 hour medical TDO. She will contact [facility] to serve the TDO. [JM]   2132 Patient reassessed at this time. Geodon and Ativan have taken effect, he is compliant and resting. Hemodynamically stable. [JM]   9500 Evaluated by internal medicine, recommends bsmart evaluation for potential psychiatric admission    [JM]   [phone] Patient without admission criteria per hospitalist service. Will hold until  can see him in the ED. [JM]   Fri Jul 23, 2021   0006 12:06 AM  Change of shift. Care of patient signed over to Dr. Diana Ku. Handoff complete. I have been unable to admit the patient to medicine or psychiatry at this point. We will turnover to oncoming REPI pending case management involvement in the morning. [JM]      ED Course User Index  [JM] Peg Peacock MD       Procedures            ED EKG interpretation:  Rhythm: normal sinus rhythm. Rate (approx.): 80.  Axis: normal.  ST segment: Nonspecific T wave inversions and flattening.  This EKG was interpreted by Abhijeet Donaldson MD,ED Provider. Perfect Serve Consult for Admission  11:13 PM    ED Room Number: ER18/18  Patient Name and age:  Tati Dukes 80 y.o.  male  Working Diagnosis:   1. Altered mental status, unspecified altered mental status type    2. Aggression    3. Dementia with behavioral disturbance, unspecified dementia type ([address]        COVID-19 Suspicion:  no  Sepsis present:  no  Reassessment needed: N/A  Code Status:  TBD  Readmission: no  Isolation Requirements:  no  Recommended Level of Care:  med/surg  Department:[facility] Adult ED - 21   Other: Patient with significant dementia with aggressive behavior. Unsafe to be discharged. Medical TDO in place. Will need social work to assist with placement. Additional information on this patient. 11:05 AM we have been awaiting psychiatry to see this patient. Additional information is obtained however and it appears this patient was not on a memory unit in the nursing facility. He was in assisted living. They do have a memory unit available at the site. Senior nurse practitioner has been discussing this case with one of the assistant administrators at the home. The son was there and the decision was made to go ahead and transfer this patient into the memory care unit at that facility. As a result of that decision, the nurse practitioner who works [facility] will come to the ED and do the initial evaluation for this patient for admission to the dementia unit. She asked the patient be given a dose of Seroquel now and as needed. She will evaluate and arrange admission into their unit. Patient is under a medical E CO until about 730 tonight. Medically the patient appears to be stable. All labs and head CT are negative. Hemodynamically he has been stable. The single dose of Geodon he had earlier has controlled his behavior nicely. He has been cooperative and sedate.   Will anticipate admission to the memory unit upon completion of the evaluation by the nurse practitioner from that unit.

## 2023-12-06 NOTE — PROGRESS NOTES
JERO: Disposition TBD-waiting on call back from [facility] as to whether they will accept patient back under hospice vs. Discharge to SNF with hospice. SNF referrals are pending. The barrier to discharge is that patient is currently in restraints, and typically facilities will require patient to be off restraints for 24 hours prior to discharge. Chart reviewed. CM met with patient, wife, and the son Ce Mcgovern [phone] at bedside. The patient is from [facility] in memory care. The son stated that patient was \"kicked out\" from [facility] for being combative. The family would like to find out if [facility] would be willing to accept patent back under with hospice. The son has also contacted [facility] for possible respite stay. CM called [facility] [phone] and spoke with Kandy Castaneda. She is checking with their  as to whether they would be able to accept patient back. Family would also like to look into [facility] or [facility]. CM provided education about hospice at SNF (Medicare will cover hospice services, but family is responsible for the cost of room and board). The son would like to know the cost of room and board at these two facilities. Disposition will likely determine which hospice agency  to use, (some facilities only contract with certain hospice agencies). CM called Pat at [address] and left voicemail message. CM called [facility] and spoke with Terri Garcia in admissions, they are unable to accept patient. CM met with family at bedside, obtained additional SNF choices, referrals sent to [facility], [facility] and [facility]. CM called [facility] again and left message. CM spoke with Jason at [facility], #[phone] she will come and do a bedside assessment with patient in the morning. The cost of room and board at [facility] is $279 per day. They do not have a private room available.     ESPERANZA called [facility] with [address] and left message.      Stephanie Arnett, BSW/CRM ([phone])Reviewed/Progressed HEP activities related to improving balance, coordination, kinesthetic sense, posture, motor skill, proprioception of core, proximal hip and LE for self care, mobility, lifting, and ambulation/stair navigation      Manual Treatments:  PROM / STM / Oscillations-Mobs:  G-I, II, III, IV (PA's, Inf., Post.)  [x] (81054) Provided manual therapy to mobilize LE, proximal hip and/or LS spine soft tissue/joints for the purpose of modulating pain, promoting relaxation,  increasing ROM, reducing/eliminating soft tissue swelling/inflammation/restriction, improving soft tissue extensibility and allowing for proper ROM for normal function with self care, mobility, lifting and ambulation. Modalities:    Charges: KX MOD  Timed Code Treatment Minutes: 52'   Total Treatment Minutes: 1:00- 2:55 105'       [] EVAL (LOW) 79647 (typically 20 minutes face-to-face)  [] EVAL (MOD) 29031 (typically 30 minutes face-to-face)  [] EVAL (HIGH) 04077 (typically 45 minutes face-to-face)  [] RE-EVAL   [x] OF(99214) x 2     [] IONTO  [x] NMR (64902) x  1    [] VASO  [] Manual (98757) x     [] Other:  [] TA x       [] Mech Traction (95531)  [] ES(attended) (64738)      [] ES (un) (59672): Hi-volt 20'    GOALS:    Patient stated goal: return to walking for exercise and hunting without pain/dysfunction. [x] Progressing: [] Met: [] Not Met: [] Adjusted     Therapist goals for Patient:   Short Term Goals: To be achieved in: 2 weeks  1. Independent in HEP and progression per patient tolerance, in order to prevent re-injury. [] Progressing: [x] Met: [] Not Met: [] Adjusted   2. Patient will have a decrease in pain to facilitate improvement in movement, function, and ADLs as indicated by Functional Deficits. [] Progressing: [x] Met: [] Not Met: [] Adjusted     Long Term Goals: To be achieved in:  8 weeks  1. Disability index score of <25% deficit on LEFS to assist with reaching prior level of function.   [x]

## (undated) DEVICE — 4-PORT MANIFOLD: Brand: NEPTUNE 2

## (undated) DEVICE — SYR 10ML LUER LOK 1/5ML GRAD --

## (undated) DEVICE — CATH URETH FOL 2W MED 18FRX5 --

## (undated) DEVICE — BAG,DRAINAGE,4L,A/R TOWER,LL,SLIDE TAP: Brand: MEDLINE

## (undated) DEVICE — CUTTING ELECTRODE BIPO 24FR 12/30°  FOR RESECTOSCOPES, TELESCOPE Ø 4MM, FOR SHEATHS, INTERMITTENT/CONTINUOUS IRRIGATION, 24/26, FR, LOOP: ROUND, WIRE Ø 0.3MM, FORK COLOR BLUE, STEM COLOR BLUE, PACK=3 PCS, FOR SHARK AND S-LINE RESECTOSCOPES, STERILE, FOR SINGLE USE: Brand: SHARK/S-LINE

## (undated) DEVICE — SOLUTION IRRIG 3000ML 0.9% SOD CHL USP UROMATIC PLAS CONT

## (undated) DEVICE — TUR SERIES SET

## (undated) DEVICE — GLOVE ORANGE PI 7 1/2   MSG9075

## (undated) DEVICE — Y-TYPE TUR IRRIGATION SET

## (undated) DEVICE — BANDAGE,GAUZE,CONFORMING,3"X75",STRL,LF: Brand: MEDLINE

## (undated) DEVICE — SYRINGE,TOOMEY,IRRIGATION,70CC,STERILE: Brand: MEDLINE

## (undated) DEVICE — CYSTO-SMH: Brand: MEDLINE INDUSTRIES, INC.